# Patient Record
Sex: FEMALE | Race: WHITE | Employment: OTHER | URBAN - METROPOLITAN AREA
[De-identification: names, ages, dates, MRNs, and addresses within clinical notes are randomized per-mention and may not be internally consistent; named-entity substitution may affect disease eponyms.]

---

## 2017-01-23 ENCOUNTER — OFFICE VISIT (OUTPATIENT)
Dept: FAMILY MEDICINE CLINIC | Age: 67
End: 2017-01-23

## 2017-01-23 VITALS
SYSTOLIC BLOOD PRESSURE: 116 MMHG | BODY MASS INDEX: 34.6 KG/M2 | TEMPERATURE: 97.2 F | HEART RATE: 89 BPM | DIASTOLIC BLOOD PRESSURE: 73 MMHG | RESPIRATION RATE: 16 BRPM | HEIGHT: 62 IN | WEIGHT: 188 LBS

## 2017-01-23 DIAGNOSIS — E78.5 HYPERLIPIDEMIA, UNSPECIFIED HYPERLIPIDEMIA TYPE: ICD-10-CM

## 2017-01-23 DIAGNOSIS — M17.11 PRIMARY OSTEOARTHRITIS OF RIGHT KNEE: ICD-10-CM

## 2017-01-23 DIAGNOSIS — F41.9 ANXIETY: ICD-10-CM

## 2017-01-23 DIAGNOSIS — F32.A DEPRESSION, UNSPECIFIED DEPRESSION TYPE: ICD-10-CM

## 2017-01-23 DIAGNOSIS — G47.9 SLEEP DISTURBANCE: ICD-10-CM

## 2017-01-23 DIAGNOSIS — E66.9 OBESITY, CLASS I, BMI 30-34.9: ICD-10-CM

## 2017-01-23 DIAGNOSIS — F33.8 SEASONAL AFFECTIVE DISORDER (HCC): Primary | ICD-10-CM

## 2017-01-23 RX ORDER — MELOXICAM 15 MG/1
15 TABLET ORAL DAILY
Qty: 90 TAB | Refills: 1 | Status: SHIPPED | OUTPATIENT
Start: 2017-01-23 | End: 2017-07-24 | Stop reason: SDUPTHER

## 2017-01-23 NOTE — MR AVS SNAPSHOT
Visit Information Date & Time Provider Department Dept. Phone Encounter #  
 1/23/2017 12:15 PM Dillon Ortega  [facility] [address] [phone] [id number] Follow-up Instructions Return in about 6 months (around 7/23/2017) for physical exam. Upcoming Health Maintenance Date Due COLONOSCOPY 6/10/2017 BREAST CANCER SCRN MAMMOGRAM 8/17/2017 PAP AKA CERVICAL CYTOLOGY 10/25/2017 DTaP/Tdap/Td series (2 - Td) 3/25/2025 Allergies as of 1/23/2017  Review Complete On: 1/23/2017 By: Dillon Ortega MD  
 No Known Allergies Current Immunizations  Reviewed on 7/11/2016 Name Date Influenza Vaccine 10/8/2014 Tdap 3/25/2015 Not reviewed this visit You Were Diagnosed With   
  
 Codes Comments [facility] ([facility]    -  Primary ICD-10-CM: F39 
ICD-9-CM: 296.99 Anxiety     ICD-10-CM: F41.9 ICD-9-CM: 300.00 Depression, unspecified depression type     ICD-10-CM: F32.9 ICD-9-CM: 170 Obesity, Class I, BMI 30-34.9     ICD-10-CM: E66.9 ICD-9-CM: 278.00 Sleep disturbance     ICD-10-CM: G47.9 ICD-9-CM: 780.50 Primary osteoarthritis of right knee     ICD-10-CM: M17.11 ICD-9-CM: 715.16 Hyperlipidemia, unspecified hyperlipidemia type     ICD-10-CM: E78.5 ICD-9-CM: 272.4 Vitals BP Pulse Temp Resp Height(growth percentile) Weight(growth percentile) 116/73 (BP 1 Location: Right arm, BP Patient Position: Sitting) 89 97.2 °F (36.2 °C) (Oral) 16 5' 2\" (1.575 m) 188 lb (85.3 kg) BMI OB Status Smoking Status 34.39 kg/m2 Hysterectomy Former Smoker Vitals History BMI and BSA Data Body Mass Index Body Surface Area  
 34.39 kg/m 2 1.93 m 2 Preferred Pharmacy Pharmacy Name Phone [address] [facility] [phone] Your Updated Medication List  
  
   
This list is accurate as of: 1/23/17  1:01 PM.  Always use your most recent med list.  
  
  
  
  
 albuterol 90 mcg/actuation inhaler Commonly known as:  PROVENTIL HFA, VENTOLIN HFA, PROAIR HFA Take 2 puffs by inhalation every four (4) hours as needed for Wheezing. ALLEGRA-D 12 HOUR PO Take 1 Tab by mouth daily as needed (Allergies). Indications: SEASONAL ALLERGIES  
  
 busPIRone 10 mg tablet Commonly known as:  BUSPAR Take 1 Tab by mouth two (2) times a day. Indications: GENERALIZED ANXIETY DISORDER  
  
 CALCIUM + D PO Take 1 Tab by mouth daily. Indications: SUPPLEMENT  
  
 escitalopram oxalate 20 mg tablet Commonly known as:  Duwaine Needs Take 1 Tab by mouth daily. Indications: MAJOR DEPRESSIVE DISORDER  
  
 magnesium oxide 400 mg tablet Commonly known as:  MAG-OX Take 400 mg by mouth daily. meloxicam 15 mg tablet Commonly known as:  MOBIC Take 1 Tab by mouth daily. Indications: OSTEOARTHRITIS MULTIPLE VITAMIN PO Take 1 Tab by mouth daily. Indications: VITAMIN DEFICIENCY PREVENTION  
  
 OLANZapine 5 mg tablet Commonly known as:  ZyPREXA Take 1 Tab by mouth nightly. Indications: DEPRESSION TREATMENT ADJUNCT  
  
 omega-3 fatty acids-vitamin e 1,000 mg Cap Take 2 Caps by mouth. Prescriptions Sent to Pharmacy Refills  
 meloxicam (MOBIC) 15 mg tablet 1 Sig: Take 1 Tab by mouth daily. Indications: OSTEOARTHRITIS Class: Normal  
 Pharmacy: [address] [place] [address] Ph #: [phone] Route: Oral  
  
We Performed the Following CRP, HIGH SENSITIVITY [47243 CPT(R)] LIPID PANEL [79477 CPT(R)] Follow-up Instructions Return in about 6 months (around 7/23/2017) for physical exam.  
  
  
Introducing [facility]! Dear Mir Hammer: 
Thank you for requesting a mValent account. Our records indicate that you already have an active mValent account. You can access your account anytime at [email or web address] Did you know that you can access your hospital and ER discharge instructions at any time in [email or web address]? You can also review all of your test results from your hospital stay or ER visit. Additional Information If you have questions, please visit the Frequently Asked Questions section of the [email or web address] website at [email or web address] Remember, [email or web address] is NOT to be used for urgent needs. For medical emergencies, dial 911. Now available from your iPhone and Android! Please provide this summary of care documentation to your next provider. Your primary care clinician is listed as Donata Louder. If you have any questions after today's visit, please call [phone].

## 2017-01-23 NOTE — PROGRESS NOTES
\"Reviewed record in preparation for visit and have obtained the necessary documentation\"  Chief Complaint   Patient presents with    Depression     follow up     Anxiety     follow up     Patient presents in the office today for a follow up anxiety and depression     1. Have you been to the ER, urgent care clinic since your last visit? Hospitalized since your last visit? No    2. Have you seen or consulted any other health care providers outside of the [address] [place] [address] since your last visit? Include any pap smears or colon screening.  No

## 2017-01-23 NOTE — PROGRESS NOTES
HISTORY OF PRESENT ILLNESS  Mj Garcia is a 61 y.o. female. Blood pressure 116/73, pulse 89, temperature 97.2 °F (36.2 °C), temperature source Oral, resp. rate 16, height 5' 2\" (1.575 m), weight 188 lb (85.3 kg). Body mass index is 34.39 kg/(m^2). Chief Complaint   Patient presents with    Depression     follow up     Anxiety     follow up      HPI   Mj Garcia 61 y.o. female  presents to the office today for a follow up. Bp at office today 116/73. Anxiety/depression: Pt continues with Lexapro 20 mg daily, Zyprexa 5 mg nightly, and Buspar 10 mg BID. I have completed depression questionnaire with pt today. She reports little interest in daily activity half the time, feeling down half the days, issues with sleep only a few days, little energy more than half the time, overeating half the days, feeling like she let others down nearly everyday, trouble concentrating about half the days, no moodiness or fidgeting noticed by others, and no SI/HI. Her score is 14. Pt notes her symptoms affect her at home and medications have improved symptoms. Pt notes her symptoms are worse in the fall and winter. Likely seasonal affective disorder. Depression and anxiety is stable, continue current regimen. Hyperlipidemia: Lipid panel on 07/12/16 notable for total cholesterol 263, , HDL 65, and triglycerides 148. Pt is currently not on any statins. LDL is elevated at 168 per labs on 07/12/16. Will reassess levels today. Will also check high sensitivity CRP today to rule out any abnormality. Current Outpatient Prescriptions   Medication Sig Dispense Refill    meloxicam (MOBIC) 15 mg tablet Take 1 Tab by mouth daily. Indications: OSTEOARTHRITIS 90 Tab 1    magnesium oxide (MAG-OX) 400 mg tablet Take 400 mg by mouth daily.  omega-3 fatty acids-vitamin e 1,000 mg cap Take 2 Caps by mouth.  OLANZapine (ZYPREXA) 5 mg tablet Take 1 Tab by mouth nightly.  Indications: DEPRESSION TREATMENT ADJUNCT 30 Tab 0    escitalopram oxalate (LEXAPRO) 20 mg tablet Take 1 Tab by mouth daily. Indications: MAJOR DEPRESSIVE DISORDER 30 Tab 0    busPIRone (BUSPAR) 10 mg tablet Take 1 Tab by mouth two (2) times a day. Indications: GENERALIZED ANXIETY DISORDER 60 Tab 1    albuterol (PROVENTIL HFA, VENTOLIN HFA, PROAIR HFA) 90 mcg/actuation inhaler Take 2 puffs by inhalation every four (4) hours as needed for Wheezing. 1 Inhaler 0    CALCIUM CARBONATE/VITAMIN D3 (CALCIUM + D PO) Take 1 Tab by mouth daily. Indications: SUPPLEMENT      FEXOFENADINE/PSEUDOEPHEDRINE (ALLEGRA-D 12 HOUR PO) Take 1 Tab by mouth daily as needed (Allergies). Indications: SEASONAL ALLERGIES      MULTIVITAMINS (MULTIPLE VITAMIN PO) Take 1 Tab by mouth daily. Indications: VITAMIN DEFICIENCY PREVENTION       No Known Allergies  Past Medical History   Diagnosis Date    Anxiety     AR (allergic rhinitis)     Depression     Spondylosis      Past Surgical History   Procedure Laterality Date    Hx hysterectomy       Family History   Problem Relation Age of Onset    Cancer Mother 80     lyphoma    Lung Disease Father     Heart Disease Father 80     a fib    Other Brother 40     Amlydosis     Social History   Substance Use Topics    Smoking status: Former Smoker     Years: 30.00     Quit date: 9/9/2003    Smokeless tobacco: Never Used    Alcohol use 0.5 oz/week     1 Glasses of wine per week      Comment: ocassional        Review of Systems   Constitutional: Negative. Negative for malaise/fatigue. Eyes: Negative for blurred vision. Respiratory: Negative for shortness of breath. Cardiovascular: Negative for chest pain and leg swelling. Musculoskeletal: Negative. Neurological: Negative. Negative for dizziness and headaches. Psychiatric/Behavioral: Positive for depression. Negative for suicidal ideas. The patient is nervous/anxious. All other systems reviewed and are negative.       Physical Exam   Constitutional: She is oriented to person, place, and time. She appears well-developed and well-nourished. No distress. HENT:   Head: Normocephalic and atraumatic. Neck: Carotid bruit is not present. Cardiovascular: Normal rate, regular rhythm, normal heart sounds and intact distal pulses. Exam reveals no gallop and no friction rub. No murmur heard. Pulmonary/Chest: Effort normal and breath sounds normal. No respiratory distress. She has no wheezes. She has no rales. Musculoskeletal: She exhibits no edema. Neurological: She is alert and oriented to person, place, and time. Skin: She is not diaphoretic. Psychiatric: She has a normal mood and affect. Her behavior is normal. Judgment and thought content normal.   Nursing note and vitals reviewed. ASSESSMENT and PLAN  Flores Joyce was seen today for depression and anxiety. Diagnoses and all orders for this visit:    Seasonal affective disorder ([facility]  Stable, continue current regimen    Anxiety  See above    Depression, unspecified depression type  See above    Obesity, Class I, BMI 30-34.9  I have reviewed/discussed the above normal BMI with the patient. I have recommended the following interventions: dietary management education, guidance, and counseling, dietary needs education, encourage exercise, feeding regime, lifestyle education regarding diet and monitor weight . The plan is as follows: I have counseled this patient on diet and exercise regimens. .      Sleep disturbance  Pt notes this has improved. Pt to notify me if symptoms progress or fail to improve. Primary osteoarthritis of right knee  -     meloxicam (MOBIC) 15 mg tablet; Take 1 Tab by mouth daily. Indications: OSTEOARTHRITIS  - Stable, continue current regimen    Hyperlipidemia, unspecified hyperlipidemia type  -     CRP, HIGH SENSITIVITY  -     LIPID PANEL  - LDL is elevated at 168 per labs on 07/20/16. Will reassess levels today. Will also check high sensitivity CRP to rule out any abnormality.        Follow-up Disposition:  Return in about 6 months (around 7/23/2017) for physical exam.     Medication risks/benefits/costs/interactions/alternatives discussed with patient. Advised patient to call back or return to office if symptoms worsen/change/persist.  If patient cannot reach us or should anything more severe/urgent arise he/she should proceed directly to the nearest emergency department. Discussed expected course/resolution/complications of diagnosis in detail with patient. Patient given a written after visit summary which includes her diagnoses, current medications and vitals. Patient expressed understanding with the diagnosis and plan. Written by khari Ness, as dictated by Dawood Rogers M.D.    I have reviewed and agree with the above note and have made corrections where appropriate, Dr. Cyndie Sandoval MD

## 2017-01-25 LAB
CHOLEST SERPL-MCNC: 297 MG/DL (ref 100–199)
CRP SERPL HS-MCNC: 5.31 MG/L (ref 0–3)
HDLC SERPL-MCNC: 66 MG/DL
INTERPRETATION, 910389: NORMAL
LDLC SERPL CALC-MCNC: 197 MG/DL (ref 0–99)
TRIGL SERPL-MCNC: 172 MG/DL (ref 0–149)
VLDLC SERPL CALC-MCNC: 34 MG/DL (ref 5–40)

## 2017-02-05 NOTE — PROGRESS NOTES
Cholesterol and extremely elevated CRP levels! Pt needs to start start Lipitor 20 mg daily #30 2 refills ( send to pharmacy)  Discuss side effects of meds with pt also ( elevated liver enzymes, myalgias)  Please call in the script and inform pt. Pt also needs a 3 month follow up.     Also we need to order a CT heart with calcium ( coronary calcium score)

## 2017-02-06 RX ORDER — ATORVASTATIN CALCIUM 20 MG/1
20 TABLET, FILM COATED ORAL DAILY
Qty: 30 TAB | Refills: 3 | Status: SHIPPED | OUTPATIENT
Start: 2017-02-06 | End: 2017-12-12 | Stop reason: SDUPTHER

## 2017-02-06 NOTE — PROGRESS NOTES
Called pt, explained regarding CT and Lipitor Rx, discussed SE,  appt for 3 months set: Monday, May 08, 2017 11:45 AM. Verified pharmacy and advised of CRP. Meds ordered, CT Ordered, explained central scheduling will call her. Patient/ Caller given an opportunity to ask questions, repeated information, and verbalized understanding.

## 2017-05-08 ENCOUNTER — OFFICE VISIT (OUTPATIENT)
Dept: FAMILY MEDICINE CLINIC | Age: 67
End: 2017-05-08

## 2017-05-08 VITALS
HEART RATE: 76 BPM | DIASTOLIC BLOOD PRESSURE: 63 MMHG | OXYGEN SATURATION: 97 % | WEIGHT: 184.8 LBS | RESPIRATION RATE: 15 BRPM | TEMPERATURE: 96.9 F | HEIGHT: 62 IN | BODY MASS INDEX: 34.01 KG/M2 | SYSTOLIC BLOOD PRESSURE: 127 MMHG

## 2017-05-08 DIAGNOSIS — E87.5 SERUM POTASSIUM ELEVATED: ICD-10-CM

## 2017-05-08 DIAGNOSIS — J30.9 ALLERGIC RHINITIS, UNSPECIFIED ALLERGIC RHINITIS TRIGGER, UNSPECIFIED RHINITIS SEASONALITY: ICD-10-CM

## 2017-05-08 DIAGNOSIS — F32.89 DEPRESSIVE DISORDER, NOT ELSEWHERE CLASSIFIED: ICD-10-CM

## 2017-05-08 DIAGNOSIS — E66.9 OBESITY, CLASS I, BMI 30-34.9: ICD-10-CM

## 2017-05-08 DIAGNOSIS — F41.9 ANXIETY: ICD-10-CM

## 2017-05-08 DIAGNOSIS — E78.2 MIXED HYPERLIPIDEMIA: Primary | ICD-10-CM

## 2017-05-08 DIAGNOSIS — Z12.31 SCREENING MAMMOGRAM, ENCOUNTER FOR: ICD-10-CM

## 2017-05-08 DIAGNOSIS — Z01.419 ENCOUNTER FOR GYNECOLOGICAL EXAMINATION WITHOUT ABNORMAL FINDING: ICD-10-CM

## 2017-05-08 RX ORDER — OLANZAPINE 2.5 MG/1
TABLET ORAL
Refills: 0 | COMMUNITY
Start: 2017-04-26 | End: 2018-03-07 | Stop reason: ALTCHOICE

## 2017-05-08 NOTE — PROGRESS NOTES
HISTORY OF PRESENT ILLNESS  Marvine Rubinstein is a 61 y.o. female. Blood pressure 127/63, pulse 76, temperature 96.9 °F (36.1 °C), temperature source Oral, resp. rate 15, height 5' 2\" (1.575 m), weight 184 lb 12.8 oz (83.8 kg), SpO2 97 %. Body mass index is 33.8 kg/(m^2). Chief Complaint   Patient presents with    Labs     3 months follow up visit    Cholesterol Problem      HPI   Marvine Rubinstein 61 y.o. female  presents to the office today for a follow up on chronic conditions. Bp at office today 127/63. Hyperlipidemia: Lipid panel on 01/24/17 notable for total cholesterol 297, , HDL 66, and triglycerides 172. Pt was advised to start Lipitor 20 mg at last visit on 01/23/17, but pt notes she never started the medication. She has been working on her diet and has been exercising. Will reassess her levels today. Pt states she will start the Lipitor if her lipids continue to be elevated. Depression/anxiety: Pt notes her mood has improved significantly since last visit on 01/23/17. Depression screening completed today. Her PHQ-2 score today was 0. She endorses sleep issues more than half the days, does not feel tired, does not endorse poor appetite/overeating, feels bad about herself several days a week, issues with concentration not at all, family members have noticed psychoretardation not at all, and denies SI/HI. PHQ-9 score today was 3 which is decreased from PHQ-9 of 14 at last visit. Pt notes her symptoms generally improve in the spring and summer. Pt continues with Lexapro 20 mg daily, Zyprexa 2.5 mg nightly, and Buspar 10 mg BID. Pt states her psychiatrist has started weaning her off the Zyprexa. She does note increased sleep issues since decreasing the Zyprexa dosage. She has issues with falling asleep and occasionally wakes up in the middle of night. Health maintenance: I have advised pt to complete cardiac CT scan to rule out any abnormality.  Pt does note family history of atrial fibrillation in father, but notes her father passed away at age 80. Pt is due for mammogram. Orders placed today. Pt notes her current gynecologist is no longer covered by her insurance. Will refer pt to Dr. Kailyn Mario (gynecology) to complete GYN exam.       Current Outpatient Prescriptions   Medication Sig Dispense Refill    OLANZapine (ZYPREXA) 2.5 mg tablet TAKE 1 TABLET EVERY DAY  0    meloxicam (MOBIC) 15 mg tablet Take 1 Tab by mouth daily. Indications: OSTEOARTHRITIS 90 Tab 1    magnesium oxide (MAG-OX) 400 mg tablet Take 400 mg by mouth daily.  omega-3 fatty acids-vitamin e 1,000 mg cap Take 2 Caps by mouth.  escitalopram oxalate (LEXAPRO) 20 mg tablet Take 1 Tab by mouth daily. Indications: MAJOR DEPRESSIVE DISORDER 30 Tab 0    busPIRone (BUSPAR) 10 mg tablet Take 1 Tab by mouth two (2) times a day. Indications: GENERALIZED ANXIETY DISORDER 60 Tab 1    albuterol (PROVENTIL HFA, VENTOLIN HFA, PROAIR HFA) 90 mcg/actuation inhaler Take 2 puffs by inhalation every four (4) hours as needed for Wheezing. 1 Inhaler 0    CALCIUM CARBONATE/VITAMIN D3 (CALCIUM + D PO) Take 1 Tab by mouth daily. Indications: SUPPLEMENT      FEXOFENADINE/PSEUDOEPHEDRINE (ALLEGRA-D 12 HOUR PO) Take 1 Tab by mouth daily as needed (Allergies). Indications: SEASONAL ALLERGIES      MULTIVITAMINS (MULTIPLE VITAMIN PO) Take 1 Tab by mouth daily. Indications: VITAMIN DEFICIENCY PREVENTION      atorvastatin (LIPITOR) 20 mg tablet Take 1 Tab by mouth daily.  30 Tab 3     No Known Allergies  Past Medical History:   Diagnosis Date    Anxiety     AR (allergic rhinitis)     Depression     Spondylosis      Past Surgical History:   Procedure Laterality Date    HX HYSTERECTOMY       Family History   Problem Relation Age of Onset    Cancer Mother 80     lyphoma    Lung Disease Father     Heart Disease Father 80     a fib    Other Brother 40     Amlydosis     Social History   Substance Use Topics    Smoking status: Former Smoker     Years: 30.00     Quit date: 9/9/2003    Smokeless tobacco: Never Used    Alcohol use 0.5 oz/week     1 Glasses of wine per week      Comment: ocassional        Review of Systems   Constitutional: Negative. Negative for malaise/fatigue. Eyes: Negative for blurred vision. Respiratory: Negative for shortness of breath. Cardiovascular: Negative for chest pain and leg swelling. Musculoskeletal: Negative. Neurological: Negative. Negative for dizziness and headaches. Endo/Heme/Allergies: Positive for environmental allergies. All other systems reviewed and are negative. Physical Exam   Constitutional: She is oriented to person, place, and time. She appears well-developed and well-nourished. No distress. HENT:   Head: Normocephalic and atraumatic. Neck: Carotid bruit is not present. Cardiovascular: Normal rate, regular rhythm, normal heart sounds and intact distal pulses. Exam reveals no gallop and no friction rub. No murmur heard. Pulmonary/Chest: Effort normal and breath sounds normal. No respiratory distress. She has no wheezes. She has no rales. Musculoskeletal: She exhibits no edema. Neurological: She is alert and oriented to person, place, and time. Skin: She is not diaphoretic. Psychiatric: She has a normal mood and affect. Her behavior is normal. Judgment and thought content normal.   Nursing note and vitals reviewed. ASSESSMENT and PLAN  Kristie Pedro was seen today for labs and cholesterol problem. Diagnoses and all orders for this visit:    Mixed hyperlipidemia  -     CT HEART W/O CONT WITH CALCIUM; Future  -     METABOLIC PANEL, COMPREHENSIVE  -     LIPID PANEL  - LDL elevated at 197 per labs on 01/24/17. Pt did not start on advised Lipitor, but has been working on diet and exercise. Will reassess levels today and pt states she will consider starting the Lipitor if her lipids continues to be elevated.  Advised pt to complete cardiac CT to rule out any abnormality. Obesity, Class I, BMI 30-34.9  I have reviewed/discussed the above normal BMI with the patient. I have recommended the following interventions: dietary management education, guidance, and counseling, encourage exercise, monitor weight and prescribed dietary intake . Depressive disorder, not elsewhere classified  Stable, continue current regimen    Anxiety  Stable, continue current regimen    Allergic rhinitis, unspecified allergic rhinitis trigger, unspecified rhinitis seasonality    Screening mammogram, encounter for  -     [facility] MAMMO BI SCREENING INCL CAD; Future    Encounter for gynecological examination without abnormal finding  -     REFERRAL TO OBSTETRICS AND GYNECOLOGY (Dr. Sadia Swartz)       Follow-up Disposition:  Return in about 3 months (around 8/8/2017) for hyperlipidemia follow up. Medication risks/benefits/costs/interactions/alternatives discussed with patient. Advised patient to call back or return to office if symptoms worsen/change/persist.  If patient cannot reach us or should anything more severe/urgent arise he/she should proceed directly to the nearest emergency department. Discussed expected course/resolution/complications of diagnosis in detail with patient. Patient given a written after visit summary which includes her diagnoses, current medications and vitals. Patient expressed understanding with the diagnosis and plan. Written by khari Griggs, as dictated by Argelia Espino M.D.    I have reviewed and agree with the above note and have made corrections where appropriate, Dr. Lisa Martinez MD

## 2017-05-08 NOTE — PROGRESS NOTES
Chief Complaint   Patient presents with    Labs     3 months follow up visit    Cholesterol Problem     Identified pt with two pt identifiers (Name @ )    1. Have you been to the ER, urgent care clinic since your last visit? Hospitalized since your last visit? No    2. Have you seen or consulted any other health care providers outside of the [address] [place] [address] since your last visit? Include any pap smears or colon screening.  No     \"REVIEWED RECORD IN PREPARATION FOR VISIT AND HAVE OBTAINED NECESSARY DOCUMENTATION\"

## 2017-05-09 LAB
ALBUMIN SERPL-MCNC: 4.6 G/DL (ref 3.6–4.8)
ALBUMIN/GLOB SERPL: 1.8 {RATIO} (ref 1.2–2.2)
ALP SERPL-CCNC: 94 IU/L (ref 39–117)
ALT SERPL-CCNC: 24 IU/L (ref 0–32)
AST SERPL-CCNC: 25 IU/L (ref 0–40)
BILIRUB SERPL-MCNC: 0.4 MG/DL (ref 0–1.2)
BUN SERPL-MCNC: 21 MG/DL (ref 8–27)
BUN/CREAT SERPL: 22 (ref 12–28)
CALCIUM SERPL-MCNC: 10 MG/DL (ref 8.7–10.3)
CHLORIDE SERPL-SCNC: 103 MMOL/L (ref 96–106)
CHOLEST SERPL-MCNC: 287 MG/DL (ref 100–199)
CO2 SERPL-SCNC: 24 MMOL/L (ref 18–29)
CREAT SERPL-MCNC: 0.94 MG/DL (ref 0.57–1)
GLOBULIN SER CALC-MCNC: 2.5 G/DL (ref 1.5–4.5)
GLUCOSE SERPL-MCNC: 91 MG/DL (ref 65–99)
HDLC SERPL-MCNC: 72 MG/DL
INTERPRETATION, 910389: NORMAL
LDLC SERPL CALC-MCNC: 184 MG/DL (ref 0–99)
POTASSIUM SERPL-SCNC: 6 MMOL/L (ref 3.5–5.2)
PROT SERPL-MCNC: 7.1 G/DL (ref 6–8.5)
SODIUM SERPL-SCNC: 143 MMOL/L (ref 134–144)
TRIGL SERPL-MCNC: 155 MG/DL (ref 0–149)
VLDLC SERPL CALC-MCNC: 31 MG/DL (ref 5–40)

## 2017-05-15 NOTE — PROGRESS NOTES
Repeat potassium this week  Elevated, specimen likely hemilyzoed  Place order and inform patient. Pt needs to start cholesterol meds and RTO in 3 months  Call pt please and set up appt.

## 2017-05-19 NOTE — PROGRESS NOTES
Called patient at [phone] and left message to call the office at [phone] to discuss lab results.

## 2017-05-26 NOTE — PROGRESS NOTES
Mail letter to patient attempted to call multiple times and no call back to call us back to discuss lab

## 2017-06-05 ENCOUNTER — TELEPHONE (OUTPATIENT)
Dept: FAMILY MEDICINE CLINIC | Age: 67
End: 2017-06-05

## 2017-06-05 NOTE — TELEPHONE ENCOUNTER
Patient is calling in regards to a missed call from [facility], tried contacting nurse, no success.     Best call back # for patient: [phone]

## 2017-06-05 NOTE — PROGRESS NOTES
[phone] verified  notified patient of her labs and set up appointment for  at [address] [place] [address]

## 2017-06-05 NOTE — TELEPHONE ENCOUNTER
[phone] verified  notified patient of her labs and set up appointment for  at [address] [place] [address] 1

## 2017-06-09 DIAGNOSIS — E87.5 SERUM POTASSIUM ELEVATED: ICD-10-CM

## 2017-06-10 LAB — POTASSIUM SERPL-SCNC: 4.5 MMOL/L (ref 3.5–5.2)

## 2017-06-12 NOTE — PROGRESS NOTES
[phone] (home) attempted to call patient no answer left message to call me back in regards to her labs

## 2017-06-14 NOTE — PROGRESS NOTES
[phone] ([place]) attempted to call patient no answer left message to call me back in regards to her labs

## 2017-06-14 NOTE — PROGRESS NOTES
[phone] attempted to call patient no answer left message and notified patient via private VM potassium are normal

## 2017-07-11 ENCOUNTER — HOSPITAL ENCOUNTER (OUTPATIENT)
Dept: CT IMAGING | Age: 67
Discharge: HOME | End: 2017-07-11
Attending: FAMILY MEDICINE
Payer: SELF-PAY

## 2017-07-11 DIAGNOSIS — E78.2 MIXED HYPERLIPIDEMIA: ICD-10-CM

## 2017-07-11 PROCEDURE — 75571 CT HRT W/O DYE W/CA TEST: CPT

## 2017-07-16 NOTE — PROGRESS NOTES
Pt has 3 appt with me  8/7,8/16 and 9/11  Please ask her what date she is coming and cancel 2 of them.     Also I want to dicuss her CT of the heart in detail

## 2017-07-17 NOTE — PROGRESS NOTES
[phone] ([place]) attempted to call patient no answer left message to call me back in regards to CT Hear scan

## 2017-07-24 DIAGNOSIS — M17.11 PRIMARY OSTEOARTHRITIS OF RIGHT KNEE: ICD-10-CM

## 2017-07-25 RX ORDER — MELOXICAM 15 MG/1
TABLET ORAL
Qty: 90 TAB | Refills: 0 | Status: SHIPPED | OUTPATIENT
Start: 2017-07-25 | End: 2017-08-16 | Stop reason: SDUPTHER

## 2017-08-16 ENCOUNTER — OFFICE VISIT (OUTPATIENT)
Dept: FAMILY MEDICINE CLINIC | Age: 67
End: 2017-08-16

## 2017-08-16 VITALS
OXYGEN SATURATION: 98 % | HEART RATE: 69 BPM | HEIGHT: 62 IN | DIASTOLIC BLOOD PRESSURE: 67 MMHG | TEMPERATURE: 96.3 F | RESPIRATION RATE: 14 BRPM | SYSTOLIC BLOOD PRESSURE: 114 MMHG | WEIGHT: 182.2 LBS | BODY MASS INDEX: 33.53 KG/M2

## 2017-08-16 DIAGNOSIS — Z12.11 COLON CANCER SCREENING: ICD-10-CM

## 2017-08-16 DIAGNOSIS — Z00.00 PHYSICAL EXAM: Primary | ICD-10-CM

## 2017-08-16 DIAGNOSIS — E78.2 MIXED HYPERLIPIDEMIA: ICD-10-CM

## 2017-08-16 DIAGNOSIS — M17.11 PRIMARY OSTEOARTHRITIS OF RIGHT KNEE: ICD-10-CM

## 2017-08-16 DIAGNOSIS — E66.9 OBESITY, CLASS I, BMI 30-34.9: ICD-10-CM

## 2017-08-16 RX ORDER — MELOXICAM 15 MG/1
TABLET ORAL
Qty: 90 TAB | Refills: 1 | Status: SHIPPED | OUTPATIENT
Start: 2017-08-16 | End: 2018-03-07 | Stop reason: SDUPTHER

## 2017-08-16 NOTE — PROGRESS NOTES
HISTORY OF PRESENT ILLNESS  Elijah Barrett is a 61 y.o. female. Blood pressure 114/67, pulse 69, temperature 96.3 °F (35.7 °C), temperature source Oral, resp. rate 14, height 5' 2\" (1.575 m), weight 182 lb 3.2 oz (82.6 kg), SpO2 98 %. Body mass index is 33.32 kg/(m^2). Chief Complaint   Patient presents with    Complete Physical    Results     discuss heart CT scan results        HPI  Elijah Barrett 61 y.o. female  presents to the office today for complete physical and results of CT scan. CT scan results: Pt had a CT scan of the heart done on 07/11/17 that was significant for \"Total calcium score of 183\" and a \"6 mm nodule in the right lower lobe\". The highest score was in the Left Anterior Descending Artery (122). Findings suggest moderate evidence of CAD. Pt states that she didn't know of the nodule in the lung. Pt states that she is currenlly taking Lipitor 20 mg daily. I have advised that we can do more imaging of the lung to further evaluate the nodule. Anxiety/Depression: Depression screening completed and pt states little interest in doings things not at all, feeling down not at all, sleep issues not at all, feeling tired not at all, poor appetite/overeating not at all, feeling bad about oneself not at all, issues with concentration not at all, family members have noticed psychoretardation not at all, and denies SI/HI. PHQ-9 score today 0. Pt states that she sees Dr. Sukumar Conde (Psychiatry) and her daughter wants her to get off of Zyprexa 2.5 mg daily because she think it is causing her to gain weight. Pt states that Zyprexa medication helps her sleep. Pt denies any issues with depression. Stable, continue current regimen. Health Maintenance: Pt states that she will see Dr. Buzz Acosta (Gynecology) next week. Pt is due for colonoscopy with Dr. Afsaneh Walton (GI). Pt states that she hasn't been exercising regularly, but has lost 2 pounds since last office visit.      Current Outpatient Prescriptions   Medication Sig Dispense Refill    meloxicam (MOBIC) 15 mg tablet TAKE 1 TABLET DAILY FOR OSTEOARTHRITIS 90 Tab 1    OLANZapine (ZYPREXA) 2.5 mg tablet TAKE 1 TABLET EVERY DAY  0    atorvastatin (LIPITOR) 20 mg tablet Take 1 Tab by mouth daily. 30 Tab 3    magnesium oxide (MAG-OX) 400 mg tablet Take 400 mg by mouth daily.  omega-3 fatty acids-vitamin e 1,000 mg cap Take 2 Caps by mouth.  escitalopram oxalate (LEXAPRO) 20 mg tablet Take 1 Tab by mouth daily. Indications: MAJOR DEPRESSIVE DISORDER 30 Tab 0    busPIRone (BUSPAR) 10 mg tablet Take 1 Tab by mouth two (2) times a day. Indications: GENERALIZED ANXIETY DISORDER 60 Tab 1    CALCIUM CARBONATE/VITAMIN D3 (CALCIUM + D PO) Take 1 Tab by mouth daily. Indications: SUPPLEMENT      FEXOFENADINE/PSEUDOEPHEDRINE (ALLEGRA-D 12 HOUR PO) Take 1 Tab by mouth daily as needed (Allergies). Indications: SEASONAL ALLERGIES      MULTIVITAMINS (MULTIPLE VITAMIN PO) Take 1 Tab by mouth daily. Indications: VITAMIN DEFICIENCY PREVENTION      albuterol (PROVENTIL HFA, VENTOLIN HFA, PROAIR HFA) 90 mcg/actuation inhaler Take 2 puffs by inhalation every four (4) hours as needed for Wheezing.  (Patient not taking: Reported on 8/16/2017) 1 Inhaler 0     No Known Allergies  Past Medical History:   Diagnosis Date    Anxiety     AR (allergic rhinitis)     Depression     Spondylosis      Past Surgical History:   Procedure Laterality Date    HX HYSTERECTOMY       Family History   Problem Relation Age of Onset    Cancer Mother 80     lyphoma    Lung Disease Father     Heart Disease Father 80     a fib    Other Brother 40     Amlydosis     Social History   Substance Use Topics    Smoking status: Former Smoker     Years: 30.00     Quit date: 9/9/2003    Smokeless tobacco: Never Used    Alcohol use 0.5 oz/week     1 Glasses of wine per week      Comment: ocassional        Review of Systems   Constitutional: Negative for chills, diaphoresis, fever, malaise/fatigue and weight loss. HENT: Negative for congestion, ear discharge, ear pain, hearing loss, nosebleeds, sore throat and tinnitus. Eyes: Negative for blurred vision, double vision, photophobia, pain, discharge and redness. Respiratory: Negative for cough, hemoptysis, sputum production, shortness of breath, wheezing and stridor. Cardiovascular: Negative for chest pain, palpitations, orthopnea, claudication, leg swelling and PND. Gastrointestinal: Negative for abdominal pain, blood in stool, constipation, diarrhea, heartburn, melena, nausea and vomiting. Genitourinary: Negative for dysuria, flank pain, frequency, hematuria and urgency. Musculoskeletal: Negative for back pain, falls, joint pain, myalgias and neck pain. Skin: Negative for itching and rash. Neurological: Negative for dizziness, tingling, tremors, sensory change, speech change, focal weakness, seizures, loss of consciousness, weakness and headaches. Endo/Heme/Allergies: Negative for environmental allergies and polydipsia. Does not bruise/bleed easily. Psychiatric/Behavioral: Negative for depression, hallucinations, memory loss, substance abuse and suicidal ideas. The patient is not nervous/anxious and does not have insomnia. All other systems reviewed and are negative. Physical Exam   Constitutional: She is oriented to person, place, and time. She appears well-developed and well-nourished. No distress. HENT:   Head: Normocephalic and atraumatic. Right Ear: External ear normal.   Left Ear: External ear normal.   Nose: Nose normal.   Mouth/Throat: Oropharynx is clear and moist. No oropharyngeal exudate. Eyes: Conjunctivae and EOM are normal. Pupils are equal, round, and reactive to light. Right eye exhibits no discharge. Left eye exhibits no discharge. No scleral icterus. Neck: Normal range of motion. Neck supple. No JVD present. No tracheal deviation present. No thyromegaly present.    Cardiovascular: Normal rate, regular rhythm, normal heart sounds and intact distal pulses. Exam reveals no gallop and no friction rub. No murmur heard. Pulmonary/Chest: Effort normal and breath sounds normal. No stridor. No respiratory distress. She has no wheezes. She has no rales. She exhibits no tenderness. Abdominal: Soft. Bowel sounds are normal. She exhibits no distension and no mass. There is no tenderness. There is no rebound and no guarding. Musculoskeletal: Normal range of motion. She exhibits no edema or tenderness. Lymphadenopathy:     She has no cervical adenopathy. Neurological: She is alert and oriented to person, place, and time. She has normal reflexes. No cranial nerve deficit. She exhibits normal muscle tone. Coordination normal.   Skin: Skin is warm and dry. No rash noted. She is not diaphoretic. No erythema. No pallor. Psychiatric: She has a normal mood and affect. Her behavior is normal. Judgment and thought content normal.   Nursing note and vitals reviewed. ASSESSMENT and PLAN  Diagnoses and all orders for this visit:    1. Physical exam  -     METABOLIC PANEL, COMPREHENSIVE  -     CBC WITH AUTOMATED DIFF  -     LIPID PANEL  -     TSH 3RD GENERATION  -     T4, FREE  -     URINALYSIS W/MICROSCOPIC  -     VITAMIN D, 25 HYDROXY    2. Mixed hyperlipidemia        - Lipids are not at goal and pt has moderate evidence of CAD per CT scan on 07/11/17. Continue to work on diet and exercise to lower levels. Continue current medications. 3. Primary osteoarthritis of right knee  -     meloxicam (MOBIC) 15 mg tablet; TAKE 1 TABLET DAILY FOR OSTEOARTHRITIS  - Stable, continue current regimen. 4. Colon cancer screening  -     REFERRAL TO GASTROENTEROLOGY (Dr. Deonte Jarvis)  - I have referred pt to Dr. Deonte Jarvis for colonoscopy. 5. Obesity, Class I, BMI 30-34.9        - I have reviewed/discussed the above normal BMI with the patient.   I have recommended the following interventions: dietary management education, guidance, and counseling, encourage exercise and monitor weight . Follow-up Disposition:  Return in about 6 months (around 2/16/2018) for hyperlipidemia follow up. Medication risks/benefits/costs/interactions/alternatives discussed with patient. Advised patient to call back or return to office if symptoms worsen/change/persist.  If patient cannot reach us or should anything more severe/urgent arise he/she should proceed directly to the nearest emergency department. Discussed expected course/resolution/complications of diagnosis in detail with patient. Patient given a written after visit summary which includes her diagnoses, current medications and vitals. Patient expressed understanding with the diagnosis and plan. Written by khari Maxwell, as dictated by Chana Sahu M.D.   I have reviewed and agree with the above note and have made corrections where appropriate, Dr. Rosalina Joel MD

## 2017-08-16 NOTE — PROGRESS NOTES
1. Have you been to the ER, urgent care clinic since your last visit? Hospitalized since your last visit? No    2. Have you seen or consulted any other health care providers outside of the [address] [place] [address] since your last visit? Include any pap smears or colon screening. No     Chief Complaint   Patient presents with    Complete Physical    Results     discuss heart CT scan results     Fasting    Colonoscopy has not been scheduled yet.

## 2017-08-16 NOTE — PATIENT INSTRUCTIONS
Body Mass Index: Care Instructions  Your Care Instructions    Body mass index (BMI) can help you see if your weight is raising your risk for health problems. It uses a formula to compare how much you weigh with how tall you are. · A BMI lower than 18.5 is considered underweight. · A BMI between 18.5 and 24.9 is considered healthy. · A BMI between 25 and 29.9 is considered overweight. A BMI of 30 or higher is considered obese. If your BMI is in the normal range, it means that you have a lower risk for weight-related health problems. If your BMI is in the overweight or obese range, you may be at increased risk for weight-related health problems, such as high blood pressure, heart disease, stroke, arthritis or joint pain, and diabetes. If your BMI is in the underweight range, you may be at increased risk for health problems such as fatigue, lower protection (immunity) against illness, muscle loss, bone loss, hair loss, and hormone problems. BMI is just one measure of your risk for weight-related health problems. You may be at higher risk for health problems if you are not active, you eat an unhealthy diet, or you drink too much alcohol or use tobacco products. Follow-up care is a key part of your treatment and safety. Be sure to make and go to all appointments, and call your doctor if you are having problems. It's also a good idea to know your test results and keep a list of the medicines you take. How can you care for yourself at home? · Practice healthy eating habits. This includes eating plenty of fruits, vegetables, whole grains, lean protein, and low-fat dairy. · If your doctor recommends it, get more exercise. Walking is a good choice. Bit by bit, increase the amount you walk every day. Try for at least 30 minutes on most days of the week. · Do not smoke. Smoking can increase your risk for health problems. If you need help quitting, talk to your doctor about stop-smoking programs and medicines. These can increase your chances of quitting for good. · Limit alcohol to 2 drinks a day for men and 1 drink a day for women. Too much alcohol can cause health problems. If you have a BMI higher than 25  · Your doctor may do other tests to check your risk for weight-related health problems. This may include measuring the distance around your waist. A waist measurement of more than 40 inches in men or 35 inches in women can increase the risk of weight-related health problems. · Talk with your doctor about steps you can take to stay healthy or improve your health. You may need to make lifestyle changes to lose weight and stay healthy, such as changing your diet and getting regular exercise. If you have a BMI lower than 18.5  · Your doctor may do other tests to check your risk for health problems. · Talk with your doctor about steps you can take to stay healthy or improve your health. You may need to make lifestyle changes to gain or maintain weight and stay healthy, such as getting more healthy foods in your diet and doing exercises to build muscle. Where can you learn more? Go to http://lorna-darryl.info/. Enter S176 in the search box to learn more about \"Body Mass Index: Care Instructions. \"  Current as of: January 23, 2017  Content Version: 11.3  © 0246-7251 [employer]. Care instructions adapted under license by [facility] (which disclaims liability or warranty for this information). If you have questions about a medical condition or this instruction, always ask your healthcare professional. [place] [address] any warranty or liability for your use of this information.

## 2017-08-16 NOTE — MR AVS SNAPSHOT
Visit Information Date & Time Provider Department Dept. Phone Encounter #  
 8/16/2017 11:00 AM Zane Dangelo MD [address] [place] [address] [phone] [id number] Follow-up Instructions Return in about 6 months (around 2/16/2018) for hyperlipidemia follow up. Upcoming Health Maintenance Date Due COLONOSCOPY 6/10/2017 INFLUENZA AGE 9 TO ADULT 8/1/2017 BREAST CANCER SCRN MAMMOGRAM 8/17/2017 PAP AKA CERVICAL CYTOLOGY 10/25/2017 DTaP/Tdap/Td series (2 - Td) 3/25/2025 Allergies as of 8/16/2017  Review Complete On: 8/16/2017 By: Mandie Ellington LPN No Known Allergies Current Immunizations  Reviewed on 5/8/2017 Name Date Influenza Vaccine 10/8/2014 Tdap 3/25/2015 Not reviewed this visit You Were Diagnosed With   
  
 Codes Comments Physical exam    -  Primary ICD-10-CM: Z00.00 ICD-9-CM: V70.9 Mixed hyperlipidemia     ICD-10-CM: E78.2 ICD-9-CM: 272.2 Primary osteoarthritis of right knee     ICD-10-CM: M17.11 ICD-9-CM: 715.16 Colon cancer screening     ICD-10-CM: Z12.11 ICD-9-CM: V76.51 Obesity, Class I, BMI 30-34.9     ICD-10-CM: E66.9 ICD-9-CM: 278.00 Vitals BP Pulse Temp Resp Height(growth percentile) Weight(growth percentile) 114/67 (BP 1 Location: Left arm, BP Patient Position: Sitting) 69 96.3 °F (35.7 °C) (Oral) 14 5' 2\" (1.575 m) 182 lb 3.2 oz (82.6 kg) SpO2 BMI OB Status Smoking Status 98% 33.32 kg/m2 Hysterectomy Former Smoker Vitals History BMI and BSA Data Body Mass Index Body Surface Area  
 33.32 kg/m 2 1.9 m 2 Preferred Pharmacy Pharmacy Name Phone [facility] [phone] Your Updated Medication List  
  
   
This list is accurate as of: 8/16/17 12:21 PM.  Always use your most recent med list.  
  
  
  
  
 albuterol 90 mcg/actuation inhaler Commonly known as:  PROVENTIL HFA, VENTOLIN HFA, PROAIR HFA Take 2 puffs by inhalation every four (4) hours as needed for Wheezing. ALLEGRA-D 12 HOUR PO Take 1 Tab by mouth daily as needed (Allergies). Indications: SEASONAL ALLERGIES  
  
 atorvastatin 20 mg tablet Commonly known as:  LIPITOR Take 1 Tab by mouth daily. busPIRone 10 mg tablet Commonly known as:  BUSPAR Take 1 Tab by mouth two (2) times a day. Indications: GENERALIZED ANXIETY DISORDER  
  
 CALCIUM + D PO Take 1 Tab by mouth daily. Indications: SUPPLEMENT  
  
 escitalopram oxalate 20 mg tablet Commonly known as:  Thu Li Take 1 Tab by mouth daily. Indications: MAJOR DEPRESSIVE DISORDER  
  
 magnesium oxide 400 mg tablet Commonly known as:  MAG-OX Take 400 mg by mouth daily. meloxicam 15 mg tablet Commonly known as:  MOBIC  
TAKE 1 TABLET DAILY FOR OSTEOARTHRITIS MULTIPLE VITAMIN PO Take 1 Tab by mouth daily. Indications: VITAMIN DEFICIENCY PREVENTION  
  
 OLANZapine 2.5 mg tablet Commonly known as:  ZyPREXA  
TAKE 1 TABLET EVERY DAY  
  
 omega-3 fatty acids-vitamin e 1,000 mg Cap Take 2 Caps by mouth. Prescriptions Sent to Pharmacy Refills  
 meloxicam (MOBIC) 15 mg tablet 1 Sig: TAKE 1 TABLET DAILY FOR OSTEOARTHRITIS Class: Normal  
 Pharmacy: [address], [address] [place] [address] Ph #: [phone] We Performed the Following CBC WITH AUTOMATED DIFF [53931 CPT(R)] LIPID PANEL [57763 CPT(R)] METABOLIC PANEL, COMPREHENSIVE [19456 CPT(R)] REFERRAL TO GASTROENTEROLOGY [CEM73 Custom] Comments:  
 Please evaluate patient for colon screen 5 year follow up. T4, FREE [id number] CPT(R)] TSH 3RD GENERATION [93638 CPT(R)] URINALYSIS W/MICROSCOPIC [03129 CPT(R)] VITAMIN D, 25 HYDROXY [id number] CPT(R)] Follow-up Instructions Return in about 6 months (around 2/16/2018) for hyperlipidemia follow up. Referral Information Referral ID Referred By Referred To  
  
 [id number] P[facility] TERRY [address]   
   [address] [address] [place] [address] [facility], [facility] Visits Status Start Date End Date 1 New Request 8/16/17 8/16/18 If your referral has a status of pending review or denied, additional information will be sent to support the outcome of this decision. Patient Instructions Body Mass Index: Care Instructions Your Care Instructions Body mass index (BMI) can help you see if your weight is raising your risk for health problems. It uses a formula to compare how much you weigh with how tall you are. · A BMI lower than 18.5 is considered underweight. · A BMI between 18.5 and 24.9 is considered healthy. · A BMI between 25 and 29.9 is considered overweight. A BMI of 30 or higher is considered obese. If your BMI is in the normal range, it means that you have a lower risk for weight-related health problems. If your BMI is in the overweight or obese range, you may be at increased risk for weight-related health problems, such as high blood pressure, heart disease, stroke, arthritis or joint pain, and diabetes. If your BMI is in the underweight range, you may be at increased risk for health problems such as fatigue, lower protection (immunity) against illness, muscle loss, bone loss, hair loss, and hormone problems. BMI is just one measure of your risk for weight-related health problems. You may be at higher risk for health problems if you are not active, you eat an unhealthy diet, or you drink too much alcohol or use tobacco products. Follow-up care is a key part of your treatment and safety. Be sure to make and go to all appointments, and call your doctor if you are having problems. It's also a good idea to know your test results and keep a list of the medicines you take. How can you care for yourself at home? · Practice healthy eating habits. This includes eating plenty of fruits, vegetables, whole grains, lean protein, and low-fat dairy. · If your doctor recommends it, get more exercise. Walking is a good choice. Bit by bit, increase the amount you walk every day. Try for at least 30 minutes on most days of the week. · Do not smoke. Smoking can increase your risk for health problems. If you need help quitting, talk to your doctor about stop-smoking programs and medicines. These can increase your chances of quitting for good. · Limit alcohol to 2 drinks a day for men and 1 drink a day for women. Too much alcohol can cause health problems. If you have a BMI higher than 25 · Your doctor may do other tests to check your risk for weight-related health problems. This may include measuring the distance around your waist. A waist measurement of more than 40 inches in men or 35 inches in women can increase the risk of weight-related health problems. · Talk with your doctor about steps you can take to stay healthy or improve your health. You may need to make lifestyle changes to lose weight and stay healthy, such as changing your diet and getting regular exercise. If you have a BMI lower than 18.5 · Your doctor may do other tests to check your risk for health problems. · Talk with your doctor about steps you can take to stay healthy or improve your health. You may need to make lifestyle changes to gain or maintain weight and stay healthy, such as getting more healthy foods in your diet and doing exercises to build muscle. Where can you learn more? Go to http://lorna-darryl.info/. Enter S176 in the search box to learn more about \"Body Mass Index: Care Instructions. \" Current as of: January 23, 2017 Content Version: 11.3 © 3515-2627 [employer].  Care instructions adapted under license by [employer] (which disclaims liability or warranty for this information). If you have questions about a medical condition or this instruction, always ask your healthcare professional. [address] any warranty or liability for your use of this information. Introducing \[facility]\"" & HEALTH SERVICES! Dear Bill Hinkle: 
Thank you for requesting a Gamestaq account. Our records indicate that you already have an active Gamestaq account. You can access your account anytime at [email or web address]. [email or web address] Did you know that you can access your hospital and ER discharge instructions at any time in Gamestaq? You can also review all of your test results from your hospital stay or ER visit. Additional Information If you have questions, please visit the Frequently Asked Questions section of the Gamestaq website at [email or web address] Remember, Gamestaq is NOT to be used for urgent needs. For medical emergencies, dial 911. Now available from your iPhone and Android! Please provide this summary of care documentation to your next provider. Your primary care clinician is listed as Kurtis Reina. If you have any questions after today's visit, please call [phone].

## 2017-08-17 LAB
25(OH)D3+25(OH)D2 SERPL-MCNC: 59.5 NG/ML (ref 30–100)
ALBUMIN SERPL-MCNC: 4.7 G/DL (ref 3.6–4.8)
ALBUMIN/GLOB SERPL: 2 {RATIO} (ref 1.2–2.2)
ALP SERPL-CCNC: 109 IU/L (ref 39–117)
ALT SERPL-CCNC: 36 IU/L (ref 0–32)
APPEARANCE UR: CLEAR
AST SERPL-CCNC: 27 IU/L (ref 0–40)
BACTERIA #/AREA URNS HPF: ABNORMAL /[HPF]
BASOPHILS # BLD AUTO: 0 X10E3/UL (ref 0–0.2)
BASOPHILS NFR BLD AUTO: 0 %
BILIRUB SERPL-MCNC: 0.5 MG/DL (ref 0–1.2)
BILIRUB UR QL STRIP: NEGATIVE
BUN SERPL-MCNC: 23 MG/DL (ref 8–27)
BUN/CREAT SERPL: 22 (ref 12–28)
CALCIUM SERPL-MCNC: 9.8 MG/DL (ref 8.7–10.3)
CASTS URNS QL MICRO: ABNORMAL /LPF
CHLORIDE SERPL-SCNC: 101 MMOL/L (ref 96–106)
CHOLEST SERPL-MCNC: 179 MG/DL (ref 100–199)
CO2 SERPL-SCNC: 22 MMOL/L (ref 18–29)
COLOR UR: YELLOW
CREAT SERPL-MCNC: 1.04 MG/DL (ref 0.57–1)
EOSINOPHIL # BLD AUTO: 0.3 X10E3/UL (ref 0–0.4)
EOSINOPHIL NFR BLD AUTO: 3 %
EPI CELLS #/AREA URNS HPF: ABNORMAL /HPF
ERYTHROCYTE [DISTWIDTH] IN BLOOD BY AUTOMATED COUNT: 13.4 % (ref 12.3–15.4)
GLOBULIN SER CALC-MCNC: 2.4 G/DL (ref 1.5–4.5)
GLUCOSE SERPL-MCNC: 84 MG/DL (ref 65–99)
GLUCOSE UR QL: NEGATIVE
HCT VFR BLD AUTO: 38.3 % (ref 34–46.6)
HDLC SERPL-MCNC: 71 MG/DL
HGB BLD-MCNC: 12.7 G/DL (ref 11.1–15.9)
HGB UR QL STRIP: NEGATIVE
IMM GRANULOCYTES # BLD: 0.1 X10E3/UL (ref 0–0.1)
IMM GRANULOCYTES NFR BLD: 1 %
INTERPRETATION, 910389: NORMAL
INTERPRETATION: NORMAL
KETONES UR QL STRIP: NEGATIVE
LDLC SERPL CALC-MCNC: 81 MG/DL (ref 0–99)
LEUKOCYTE ESTERASE UR QL STRIP: ABNORMAL
LYMPHOCYTES # BLD AUTO: 2.3 X10E3/UL (ref 0.7–3.1)
LYMPHOCYTES NFR BLD AUTO: 26 %
MCH RBC QN AUTO: 28.5 PG (ref 26.6–33)
MCHC RBC AUTO-ENTMCNC: 33.2 G/DL (ref 31.5–35.7)
MCV RBC AUTO: 86 FL (ref 79–97)
MICRO URNS: ABNORMAL
MONOCYTES # BLD AUTO: 0.7 X10E3/UL (ref 0.1–0.9)
MONOCYTES NFR BLD AUTO: 8 %
MUCOUS THREADS URNS QL MICRO: PRESENT
NEUTROPHILS # BLD AUTO: 5.5 X10E3/UL (ref 1.4–7)
NEUTROPHILS NFR BLD AUTO: 62 %
NITRITE UR QL STRIP: NEGATIVE
PDF IMAGE, 910387: NORMAL
PH UR STRIP: 7 [PH] (ref 5–7.5)
PLATELET # BLD AUTO: 264 X10E3/UL (ref 150–379)
POTASSIUM SERPL-SCNC: 4.9 MMOL/L (ref 3.5–5.2)
PROT SERPL-MCNC: 7.1 G/DL (ref 6–8.5)
PROT UR QL STRIP: NEGATIVE
RBC # BLD AUTO: 4.46 X10E6/UL (ref 3.77–5.28)
RBC #/AREA URNS HPF: ABNORMAL /HPF
SODIUM SERPL-SCNC: 141 MMOL/L (ref 134–144)
SP GR UR: 1.01 (ref 1–1.03)
T4 FREE SERPL-MCNC: 1.09 NG/DL (ref 0.82–1.77)
TRIGL SERPL-MCNC: 137 MG/DL (ref 0–149)
TSH SERPL DL<=0.005 MIU/L-ACNC: 1.58 UIU/ML (ref 0.45–4.5)
UROBILINOGEN UR STRIP-MCNC: 0.2 MG/DL (ref 0.2–1)
VLDLC SERPL CALC-MCNC: 27 MG/DL (ref 5–40)
WBC # BLD AUTO: 8.9 X10E3/UL (ref 3.4–10.8)
WBC #/AREA URNS HPF: ABNORMAL /HPF

## 2017-09-10 NOTE — PROGRESS NOTES
1) cholesterol much improved,continue current regimen. 2) renal function elevated---pt was likely fasting recheck in 1week  Pt needs to be well hydrated place future order for BMP please    3)3+ leucocytes- likely urinary contaminant we need to recheck the urine also    Please call and infomr pt.

## 2017-09-11 NOTE — PROGRESS NOTES
[phone] (home)   Attempted to call patient no answer left message to call me back in regards to labs

## 2017-09-29 NOTE — PROGRESS NOTES
Attempted to call patient multiple times no answer send letter to call us back in regards to her labs

## 2017-10-24 DIAGNOSIS — D72.829 LEUKOCYTOSIS, UNSPECIFIED TYPE: Primary | ICD-10-CM

## 2017-10-24 DIAGNOSIS — R79.89 ELEVATED SERUM CREATININE: ICD-10-CM

## 2017-10-24 NOTE — PROGRESS NOTES
Patient called back verified   Patient notified of above note and voiced understanding of what was read.   Order urinalysis and bmp ok per Dr Rabia Jama

## 2017-12-12 NOTE — TELEPHONE ENCOUNTER
Refill request:   Requested Prescriptions     Pending Prescriptions Disp Refills    atorvastatin (LIPITOR) 20 mg tablet 30 Tab 3     Sig: Take 1 Tab by mouth daily.            Need 90 day supply-

## 2017-12-15 RX ORDER — ATORVASTATIN CALCIUM 20 MG/1
20 TABLET, FILM COATED ORAL DAILY
Qty: 90 TAB | Refills: 1 | Status: SHIPPED | OUTPATIENT
Start: 2017-12-15 | End: 2018-05-27 | Stop reason: SDUPTHER

## 2018-03-07 ENCOUNTER — OFFICE VISIT (OUTPATIENT)
Dept: FAMILY MEDICINE CLINIC | Age: 68
End: 2018-03-07

## 2018-03-07 VITALS
OXYGEN SATURATION: 96 % | SYSTOLIC BLOOD PRESSURE: 128 MMHG | BODY MASS INDEX: 35.19 KG/M2 | RESPIRATION RATE: 18 BRPM | WEIGHT: 191.2 LBS | HEART RATE: 77 BPM | DIASTOLIC BLOOD PRESSURE: 76 MMHG | TEMPERATURE: 96.2 F | HEIGHT: 62 IN

## 2018-03-07 DIAGNOSIS — R79.89 ELEVATED SERUM CREATININE: ICD-10-CM

## 2018-03-07 DIAGNOSIS — E87.5 SERUM POTASSIUM ELEVATED: ICD-10-CM

## 2018-03-07 DIAGNOSIS — Z23 ENCOUNTER FOR IMMUNIZATION: ICD-10-CM

## 2018-03-07 DIAGNOSIS — M17.11 PRIMARY OSTEOARTHRITIS OF RIGHT KNEE: ICD-10-CM

## 2018-03-07 DIAGNOSIS — F33.8 SEASONAL AFFECTIVE DISORDER (HCC): ICD-10-CM

## 2018-03-07 DIAGNOSIS — E78.2 MIXED HYPERLIPIDEMIA: Primary | ICD-10-CM

## 2018-03-07 DIAGNOSIS — E66.9 OBESITY, CLASS I, BMI 30-34.9: ICD-10-CM

## 2018-03-07 RX ORDER — MELOXICAM 15 MG/1
TABLET ORAL
Qty: 90 TAB | Refills: 1 | Status: SHIPPED | OUTPATIENT
Start: 2018-03-07 | End: 2018-04-15 | Stop reason: SDUPTHER

## 2018-03-07 RX ORDER — LURASIDONE HYDROCHLORIDE 20 MG/1
TABLET, FILM COATED ORAL
Refills: 1 | COMMUNITY
Start: 2018-03-02 | End: 2019-03-06

## 2018-03-07 NOTE — ASSESSMENT & PLAN NOTE
Stable, based on history, physical exam and review of pertinent labs, studies and medications; meds reconciled; continue current treatment plan. Key Psychotherapeutic Meds             LATUDA 20 mg tab tablet  (Taking) take 1/4 tab ( 5mg ) q day    escitalopram oxalate (LEXAPRO) 20 mg tablet  (Taking) Take 1 Tab by mouth daily. Indications: MAJOR DEPRESSIVE DISORDER    busPIRone (BUSPAR) 10 mg tablet  (Taking) Take 1 Tab by mouth two (2) times a day. Indications: GENERALIZED ANXIETY DISORDER        Other [address] [place] [address]     The patient is on no other behavioral health meds.         Lab Results   Component Value Date/Time    Sodium 141 08/16/2017 12:34 PM    Creatinine 1.04 08/16/2017 12:34 PM    TSH 1.580 08/16/2017 12:34 PM    WBC 8.9 08/16/2017 12:34 PM    ALT (SGPT) 36 08/16/2017 12:34 PM    AST (SGOT) 27 08/16/2017 12:34 PM

## 2018-03-07 NOTE — MR AVS SNAPSHOT
[address] [place] [address] 
[phone] Patient: Sonal Munson MRN: [id number] FTK:10/28/8240 Visit Information Date & Time Provider Department Dept. Phone Encounter #  
 3/7/2018  1:45 PM Richard Gibson MD [address] [place] [address] [phone] [id number] Follow-up Instructions Return in about 6 months (around 9/7/2018) for hyperlipidemia follow up. Upcoming Health Maintenance Date Due COLONOSCOPY 6/10/2017 BREAST CANCER SCRN MAMMOGRAM 8/17/2017 GLAUCOMA SCREENING Q2Y 12/31/2017 Pneumococcal 65+ High/Highest Risk (1 of 2 - PCV13) 12/31/2017 MEDICARE YEARLY EXAM 2/14/2019 DTaP/Tdap/Td series (2 - Td) 3/25/2025 Allergies as of 3/7/2018  Review Complete On: 3/7/2018 By: Richard Gibson MD  
 No Known Allergies Current Immunizations  Reviewed on 3/7/2018 Name Date Influenza Vaccine 10/8/2014 Tdap 3/25/2015 Reviewed by Richard Gibson MD on 3/7/2018 at  2:09 PM  
You Were Diagnosed With   
  
 Codes Comments Mixed hyperlipidemia    -  Primary ICD-10-CM: U06.7 ICD-9-CM: 272.2 Obesity, Class I, BMI 30-34.9     ICD-10-CM: E66.9 ICD-9-CM: 278.00 Encounter for immunization     ICD-10-CM: I63 ICD-9-CM: V03.89 Primary osteoarthritis of right knee     ICD-10-CM: M17.11 ICD-9-CM: 715.16 Seasonal affective disorder ([facility] [address]     ICD-10-CM: F33.9 ICD-9-CM: 296.99 Vitals BP Pulse Temp Resp Height(growth percentile) Weight(growth percentile) 128/76 (BP 1 Location: Right arm, BP Patient Position: Sitting) 77 96.2 °F (35.7 °C) (Oral) 18 5' 2\" (1.575 m) 191 lb 3.2 oz (86.7 kg) SpO2 BMI OB Status Smoking Status 96% 34.97 kg/m2 Hysterectomy Former Smoker Vitals History BMI and BSA Data Body Mass Index Body Surface Area 34.97 kg/m 2 1.95 m 2 Preferred Pharmacy Pharmacy Name Phone [id number] [phone] Your Updated Medication List  
  
   
This list is accurate as of 3/7/18  2:22 PM.  Always use your most recent med list.  
  
  
  
  
 albuterol 90 mcg/actuation inhaler Commonly known as:  PROVENTIL HFA, VENTOLIN HFA, PROAIR HFA Take 2 puffs by inhalation every four (4) hours as needed for Wheezing. ALLEGRA-D 12 HOUR PO Take 1 Tab by mouth daily as needed (Allergies). Indications: SEASONAL ALLERGIES  
  
 atorvastatin 20 mg tablet Commonly known as:  LIPITOR Take 1 Tab by mouth daily. busPIRone 10 mg tablet Commonly known as:  BUSPAR Take 1 Tab by mouth two (2) times a day. Indications: GENERALIZED ANXIETY DISORDER  
  
 CALCIUM + D PO Take 1 Tab by mouth daily. Indications: SUPPLEMENT  
  
 escitalopram oxalate 20 mg tablet Commonly known as:  [place] Take 1 Tab by mouth daily. Indications: MAJOR DEPRESSIVE DISORDER  
  
 LATUDA 20 mg Tab tablet Generic drug:  lurasidone  
take 1/4 tab ( 5mg ) q day  
  
 magnesium oxide 400 mg tablet Commonly known as:  MAG-OX Take 400 mg by mouth daily. meloxicam 15 mg tablet Commonly known as:  MOBIC  
TAKE 1 TABLET DAILY FOR OSTEOARTHRITIS MULTIPLE VITAMIN PO Take 1 Tab by mouth daily. Indications: VITAMIN DEFICIENCY PREVENTION  
  
 omega-3 fatty acids-vitamin e 1,000 mg Cap Take 2 Caps by mouth.  
  
 pneumococcal 13 marylin conj dip 0.5 mL Syrg injection Commonly known as:  PREVNAR-13  
0.5 mL by IntraMUSCular route once for 1 dose. Prescriptions Sent to Pharmacy Refills  
 pneumococcal 13 marylin conj dip (PREVNAR-13) 0.5 mL syrg injection 0 Si.5 mL by IntraMUSCular route once for 1 dose. Class: Normal  
 Pharmacy: [address] [place] [address] Ph #: [phone] Route: IntraMUSCular  
 meloxicam (MOBIC) 15 mg tablet 1 Sig: TAKE 1 TABLET DAILY FOR OSTEOARTHRITIS Class: Normal  
 Pharmacy: [address] [place] [address] #: [phone] We Performed the Following LIPID PANEL [83990 CPT(R)] METABOLIC PANEL, COMPREHENSIVE [20249 CPT(R)] Follow-up Instructions Return in about 6 months (around 9/7/2018) for hyperlipidemia follow up. Patient Instructions Body Mass Index: Care Instructions Your Care Instructions Body mass index (BMI) can help you see if your weight is raising your risk for health problems. It uses a formula to compare how much you weigh with how tall you are. · A BMI lower than 18.5 is considered underweight. · A BMI between 18.5 and 24.9 is considered healthy. · A BMI between 25 and 29.9 is considered overweight. A BMI of 30 or higher is considered obese. If your BMI is in the normal range, it means that you have a lower risk for weight-related health problems. If your BMI is in the overweight or obese range, you may be at increased risk for weight-related health problems, such as high blood pressure, heart disease, stroke, arthritis or joint pain, and diabetes. If your BMI is in the underweight range, you may be at increased risk for health problems such as fatigue, lower protection (immunity) against illness, muscle loss, bone loss, hair loss, and hormone problems. BMI is just one measure of your risk for weight-related health problems. You may be at higher risk for health problems if you are not active, you eat an unhealthy diet, or you drink too much alcohol or use tobacco products. Follow-up care is a key part of your treatment and safety. Be sure to make and go to all appointments, and call your doctor if you are having problems. It's also a good idea to know your test results and keep a list of the medicines you take. How can you care for yourself at home? · Practice healthy eating habits.  This includes eating plenty of fruits, vegetables, whole grains, lean protein, and low-fat dairy. · If your doctor recommends it, get more exercise. Walking is a good choice. Bit by bit, increase the amount you walk every day. Try for at least 30 minutes on most days of the week. · Do not smoke. Smoking can increase your risk for health problems. If you need help quitting, talk to your doctor about stop-smoking programs and medicines. These can increase your chances of quitting for good. · Limit alcohol to 2 drinks a day for men and 1 drink a day for women. Too much alcohol can cause health problems. If you have a BMI higher than 25 · Your doctor may do other tests to check your risk for weight-related health problems. This may include measuring the distance around your waist. A waist measurement of more than 40 inches in men or 35 inches in women can increase the risk of weight-related health problems. · Talk with your doctor about steps you can take to stay healthy or improve your health. You may need to make lifestyle changes to lose weight and stay healthy, such as changing your diet and getting regular exercise. If you have a BMI lower than 18.5 · Your doctor may do other tests to check your risk for health problems. · Talk with your doctor about steps you can take to stay healthy or improve your health. You may need to make lifestyle changes to gain or maintain weight and stay healthy, such as getting more healthy foods in your diet and doing exercises to build muscle. Where can you learn more? Go to http://lorna-darryl.info/. Enter S176 in the search box to learn more about \"Body Mass Index: Care Instructions. \" Current as of: October 13, 2016 Content Version: 11.4 © 7318-5301 Healthwise, Incorporated. Care instructions adapted under license by [employer] (which disclaims liability or warranty for this information).  If you have questions about a medical condition or this instruction, always ask your healthcare professional. [address] any warranty or liability for your use of this information. Introducing [facility]! Dear Holland Khanna: 
Thank you for requesting a [employer] account. Our records indicate that you already have an active [employer] account. You can access your account anytime at [email or web address] Did you know that you can access your hospital and ER discharge instructions at any time in [employer]? You can also review all of your test results from your hospital stay or ER visit. Additional Information If you have questions, please visit the Frequently Asked Questions section of the [employer] website at [email or web address] Remember, [employer] is NOT to be used for urgent needs. For medical emergencies, dial 911. Now available from your iPhone and Android! Please provide this summary of care documentation to your next provider. Your primary care clinician is listed as Becca Meadows. If you have any questions after today's visit, please call [phone].

## 2018-03-07 NOTE — PATIENT INSTRUCTIONS
Body Mass Index: Care Instructions  Your Care Instructions    Body mass index (BMI) can help you see if your weight is raising your risk for health problems. It uses a formula to compare how much you weigh with how tall you are. · A BMI lower than 18.5 is considered underweight. · A BMI between 18.5 and 24.9 is considered healthy. · A BMI between 25 and 29.9 is considered overweight. A BMI of 30 or higher is considered obese. If your BMI is in the normal range, it means that you have a lower risk for weight-related health problems. If your BMI is in the overweight or obese range, you may be at increased risk for weight-related health problems, such as high blood pressure, heart disease, stroke, arthritis or joint pain, and diabetes. If your BMI is in the underweight range, you may be at increased risk for health problems such as fatigue, lower protection (immunity) against illness, muscle loss, bone loss, hair loss, and hormone problems. BMI is just one measure of your risk for weight-related health problems. You may be at higher risk for health problems if you are not active, you eat an unhealthy diet, or you drink too much alcohol or use tobacco products. Follow-up care is a key part of your treatment and safety. Be sure to make and go to all appointments, and call your doctor if you are having problems. It's also a good idea to know your test results and keep a list of the medicines you take. How can you care for yourself at home? · Practice healthy eating habits. This includes eating plenty of fruits, vegetables, whole grains, lean protein, and low-fat dairy. · If your doctor recommends it, get more exercise. Walking is a good choice. Bit by bit, increase the amount you walk every day. Try for at least 30 minutes on most days of the week. · Do not smoke. Smoking can increase your risk for health problems. If you need help quitting, talk to your doctor about stop-smoking programs and medicines. These can increase your chances of quitting for good. · Limit alcohol to 2 drinks a day for men and 1 drink a day for women. Too much alcohol can cause health problems. If you have a BMI higher than 25  · Your doctor may do other tests to check your risk for weight-related health problems. This may include measuring the distance around your waist. A waist measurement of more than 40 inches in men or 35 inches in women can increase the risk of weight-related health problems. · Talk with your doctor about steps you can take to stay healthy or improve your health. You may need to make lifestyle changes to lose weight and stay healthy, such as changing your diet and getting regular exercise. If you have a BMI lower than 18.5  · Your doctor may do other tests to check your risk for health problems. · Talk with your doctor about steps you can take to stay healthy or improve your health. You may need to make lifestyle changes to gain or maintain weight and stay healthy, such as getting more healthy foods in your diet and doing exercises to build muscle. Where can you learn more? Go to http://lorna-darryl.info/. Enter S176 in the search box to learn more about \"Body Mass Index: Care Instructions. \"  Current as of: October 13, 2016  Content Version: 11.4  © 3491-0895 Healthwise, Incorporated. Care instructions adapted under license by [employer] (which disclaims liability or warranty for this information). If you have questions about a medical condition or this instruction, always ask your healthcare professional. [address] any warranty or liability for your use of this information.

## 2018-03-07 NOTE — PROGRESS NOTES
Chief Complaint   Patient presents with    Cholesterol Problem       Reviewed Record in preparation for visit and have obtained necessary documentation. Identified pt with two pt identifiers (Name @ )    Health Maintenance Due   Topic    COLONOSCOPY     Influenza Age 5 to Adult     BREAST CANCER SCRN MAMMOGRAM     GLAUCOMA SCREENING Q2Y     Pneumococcal 65+ High/Highest Risk (1 of 2 - PCV13)         1. Have you been to the ER, urgent care clinic since your last visit? Hospitalized since your last visit? No    2. Have you seen or consulted any other health care providers outside of the [address] [place] [address] since your last visit? Include any pap smears or colon screening.  No

## 2018-03-07 NOTE — PROGRESS NOTES
HISTORY OF PRESENT ILLNESS  Mague Rodriguez is a 74 y.o. female. Blood pressure 128/76, pulse 77, temperature 96.2 °F (35.7 °C), temperature source Oral, resp. rate 18, height 5' 2\" (1.575 m), weight 191 lb 3.2 oz (86.7 kg), SpO2 96 %. Body mass index is 34.97 kg/(m^2). Chief Complaint   Patient presents with    Cholesterol Problem        HPI  Mague Rodriguez 74 y.o. female  presents to the office today for cholesterol follow up. Hyperlipidemia: Lipid panel on 08/16/17 notable for total cholesterol 179, LDL 81, HDL 71, and triglycerides 137. Pt continues with Atorvastatin 20mg daily. Presumed stable, will assess levels today. Anxiety: Pt continues with Latuda 20mg quarter tablet daily, Lexapro 20mg daily and Buspar 10mg BID. She states she is doing well on her current regimen. Health maintenance: Advised pt that she is due for a colonoscopy, will send pt to Dr. Farhat Linton (GI). Pt states that she had her mammogram in 08/2017. She has not followed up with Dr. Olegario Dixon (OB-GYN) yet, but knows she needs to make an appointment. Pt states that she had her eye exam in 08/2017. Advised pt that she is due for her pneumonia vaccines. Current Outpatient Prescriptions   Medication Sig Dispense Refill    LATUDA 20 mg tab tablet take 1/4 tab ( 5mg ) q day  1    pneumococcal 13 marylin conj dip (PREVNAR-13) 0.5 mL syrg injection 0.5 mL by IntraMUSCular route once for 1 dose. 0.5 mL 0    meloxicam (MOBIC) 15 mg tablet TAKE 1 TABLET DAILY FOR OSTEOARTHRITIS 90 Tab 1    atorvastatin (LIPITOR) 20 mg tablet Take 1 Tab by mouth daily. 90 Tab 1    magnesium oxide (MAG-OX) 400 mg tablet Take 400 mg by mouth daily.  omega-3 fatty acids-vitamin e 1,000 mg cap Take 2 Caps by mouth.  escitalopram oxalate (LEXAPRO) 20 mg tablet Take 1 Tab by mouth daily. Indications: MAJOR DEPRESSIVE DISORDER 30 Tab 0    busPIRone (BUSPAR) 10 mg tablet Take 1 Tab by mouth two (2) times a day.  Indications: GENERALIZED ANXIETY DISORDER 60 Tab 1    albuterol (PROVENTIL HFA, VENTOLIN HFA, PROAIR HFA) 90 mcg/actuation inhaler Take 2 puffs by inhalation every four (4) hours as needed for Wheezing. 1 Inhaler 0    CALCIUM CARBONATE/VITAMIN D3 (CALCIUM + D PO) Take 1 Tab by mouth daily. Indications: SUPPLEMENT      FEXOFENADINE/PSEUDOEPHEDRINE (ALLEGRA-D 12 HOUR PO) Take 1 Tab by mouth daily as needed (Allergies). Indications: SEASONAL ALLERGIES      MULTIVITAMINS (MULTIPLE VITAMIN PO) Take 1 Tab by mouth daily. Indications: VITAMIN DEFICIENCY PREVENTION       No Known Allergies  Past Medical History:   Diagnosis Date    Anxiety     AR (allergic rhinitis)     Depression     Spondylosis      Past Surgical History:   Procedure Laterality Date    HX HYSTERECTOMY       Family History   Problem Relation Age of Onset    Cancer Mother 80     lyphoma    Lung Disease Father     Heart Disease Father 80     a fib    Other Brother 40     Amlydosis     Social History   Substance Use Topics    Smoking status: Former Smoker     Years: 30.00     Quit date: 9/9/2003    Smokeless tobacco: Never Used    Alcohol use 0.5 oz/week     1 Glasses of wine per week      Comment: ocassional        Review of Systems   Constitutional: Negative. Negative for malaise/fatigue. Eyes: Negative for blurred vision. Respiratory: Negative for shortness of breath. Cardiovascular: Negative for chest pain and leg swelling. Musculoskeletal: Negative. Neurological: Negative. Negative for dizziness and headaches. All other systems reviewed and are negative. Physical Exam   Constitutional: She is oriented to person, place, and time. She appears well-developed and well-nourished. No distress. HENT:   Head: Normocephalic and atraumatic. Neck: Carotid bruit is not present. Cardiovascular: Normal rate, regular rhythm, normal heart sounds and intact distal pulses. Exam reveals no gallop and no friction rub. No murmur heard.   Pulmonary/Chest: Effort normal and breath sounds normal. No respiratory distress. She has no wheezes. She has no rales. Musculoskeletal: She exhibits no edema. Neurological: She is alert and oriented to person, place, and time. Skin: She is not diaphoretic. Psychiatric: She has a normal mood and affect. Her behavior is normal. Judgment and thought content normal.   Nursing note and vitals reviewed. ASSESSMENT and PLAN  Diagnoses and all orders for this visit:    1. Mixed hyperlipidemia  -     LIPID PANEL  -     METABOLIC PANEL, COMPREHENSIVE  - Lipid panel on 08/16/17 notable for total cholesterol 179, LDL 81, HDL 71, and triglycerides 137. Pt continues with Atorvastatin 20mg daily. Presumed stable, will assess levels today. 2. Obesity, Class I, BMI 30-34.9        - I have reviewed/discussed the above normal BMI with the patient. I have recommended the following interventions: dietary management education, guidance, and counseling, encourage exercise and monitor weight . 3. Encounter for immunization  -     pneumococcal 13 marylin conj dip (PREVNAR-13) 0.5 mL syrg injection; 0.5 mL by IntraMUSCular route once for 1 dose. 4. Primary osteoarthritis of right knee  -     meloxicam (MOBIC) 15 mg tablet; TAKE 1 TABLET DAILY FOR OSTEOARTHRITIS  - Stable, continue current regimen. 5. Seasonal affective disorder [facility])  Assessment & Plan:  Stable, based on history, physical exam and review of pertinent labs, studies and medications; meds reconciled; continue current treatment plan. Key Psychotherapeutic Meds             LATUDA 20 mg tab tablet  (Taking) take 1/4 tab ( 5mg ) q day    escitalopram oxalate (LEXAPRO) 20 mg tablet  (Taking) Take 1 Tab by mouth daily. Indications: MAJOR DEPRESSIVE DISORDER    busPIRone (BUSPAR) 10 mg tablet  (Taking) Take 1 Tab by mouth two (2) times a day.  Indications: GENERALIZED ANXIETY DISORDER        Other [address]     The patient is on no other behavioral health meds.        Lab Results   Component Value Date/Time    Sodium 141 08/16/2017 12:34 PM    Creatinine 1.04 08/16/2017 12:34 PM    TSH 1.580 08/16/2017 12:34 PM    WBC 8.9 08/16/2017 12:34 PM    ALT (SGPT) 36 08/16/2017 12:34 PM    AST (SGOT) 27 08/16/2017 12:34 PM       Follow-up Disposition:  Return in about 6 months (around 9/7/2018) for hyperlipidemia follow up. Medication risks/benefits/costs/interactions/alternatives discussed with patient. Advised patient to call back or return to office if symptoms worsen/change/persist.  If patient cannot reach us or should anything more severe/urgent arise he/she should proceed directly to the nearest emergency department. Discussed expected course/resolution/complications of diagnosis in detail with patient. Patient given a written after visit summary which includes her diagnoses, current medications and vitals. Patient expressed understanding with the diagnosis and plan. Written by khari Magdaleno, as dictated by Kimberly Buitrago M.D.   I have reviewed and agree with the above note and have made corrections where appropriate, Dr. Saba Cardoza MD

## 2018-03-07 NOTE — LETTER
3/7/2018 4:48 PM 
 
Ms. Liam Henriquez [address]. To whom it may concern: 
 
 
Patient  Beverly Richardson 1950 was seen in our office today and per patient she/he was seen for Mammogram examination at your office can we have his/her results fax to our office so we can update his/her chart [phone]. If you have any question please don't hesitate to call us back. Sincerely, Marcelino Nichols MD

## 2018-03-07 NOTE — LETTER
3/7/2018 4:44 PM 
 
Ms. Raoul Guevara [address]. To whom it may concern: 
 
 
Patient Federico Jade 1950 was seen in our office today and per patient she/he was seen for eye examination at your office can we have his/her results fax to our office so we can update his/her chart [phone]. If you have any question please don't hesitate to call us back. Sincerely, Elidia Nelson MD

## 2018-03-08 LAB
ALBUMIN SERPL-MCNC: 4.4 G/DL (ref 3.6–4.8)
ALBUMIN/GLOB SERPL: 1.8 {RATIO} (ref 1.2–2.2)
ALP SERPL-CCNC: 88 IU/L (ref 39–117)
ALT SERPL-CCNC: 29 IU/L (ref 0–32)
AST SERPL-CCNC: 25 IU/L (ref 0–40)
BILIRUB SERPL-MCNC: 0.4 MG/DL (ref 0–1.2)
BUN SERPL-MCNC: 29 MG/DL (ref 8–27)
BUN/CREAT SERPL: 28 (ref 12–28)
CALCIUM SERPL-MCNC: 9.8 MG/DL (ref 8.7–10.3)
CHLORIDE SERPL-SCNC: 104 MMOL/L (ref 96–106)
CHOLEST SERPL-MCNC: 189 MG/DL (ref 100–199)
CO2 SERPL-SCNC: 26 MMOL/L (ref 18–29)
CREAT SERPL-MCNC: 1.05 MG/DL (ref 0.57–1)
GFR SERPLBLD CREATININE-BSD FMLA CKD-EPI: 56 ML/MIN/1.73
GFR SERPLBLD CREATININE-BSD FMLA CKD-EPI: 64 ML/MIN/1.73
GLOBULIN SER CALC-MCNC: 2.5 G/DL (ref 1.5–4.5)
GLUCOSE SERPL-MCNC: 94 MG/DL (ref 65–99)
HDLC SERPL-MCNC: 73 MG/DL
INTERPRETATION, 910389: NORMAL
INTERPRETATION: NORMAL
LDLC SERPL CALC-MCNC: 82 MG/DL (ref 0–99)
PDF IMAGE, 910387: NORMAL
POTASSIUM SERPL-SCNC: 5.6 MMOL/L (ref 3.5–5.2)
PROT SERPL-MCNC: 6.9 G/DL (ref 6–8.5)
SODIUM SERPL-SCNC: 145 MMOL/L (ref 134–144)
TRIGL SERPL-MCNC: 170 MG/DL (ref 0–149)
VLDLC SERPL CALC-MCNC: 34 MG/DL (ref 5–40)

## 2018-03-19 NOTE — PROGRESS NOTES
Place order for bmp( future)     1) renal function slightly elevated, so is potassium. We need to recheck bmp in 2 weeks ask her to be well hydrated.      2) lipid panel is at goal.      See you as advised    Any question set me know

## 2018-03-19 NOTE — PROGRESS NOTES
[phone] attempted to call patient no answer left message to call me back in regards to her labs  Bmp order per Dr Nikia mantilla to order

## 2018-04-04 ENCOUNTER — TELEPHONE (OUTPATIENT)
Dept: FAMILY MEDICINE CLINIC | Age: 68
End: 2018-04-04

## 2018-04-04 NOTE — TELEPHONE ENCOUNTER
Called patient and lm to call back re her labs. Will close this encounter since lab encounter is pending .

## 2018-04-04 NOTE — TELEPHONE ENCOUNTER
Patient is returning the call from 3/19/18  Regarding her lab results  She is requesting a call back  Patients best call back  : [phone]  Patient last seen : March 07, 2018 by Dr Manuel Gottlieb

## 2018-04-04 NOTE — TELEPHONE ENCOUNTER
Patient is returning call back to office that she missed.   She states she can be reached back and one of the two numbers provided below      Best call [phone] or [phone]

## 2018-04-05 DIAGNOSIS — E87.5 SERUM POTASSIUM ELEVATED: ICD-10-CM

## 2018-04-05 DIAGNOSIS — R79.89 ELEVATED SERUM CREATININE: ICD-10-CM

## 2018-04-05 NOTE — LETTER
4/18/2018 2:58 PM 
 
Ms. Zohaib Proctor 71. Dear Zohaib Kim: Please find your most recent results below. Resulted Orders METABOLIC PANEL, BASIC Result Value Ref Range Glucose 86 65 - 99 mg/dL BUN 22 8 - 27 mg/dL Creatinine 0.90 0.57 - 1.00 mg/dL GFR est non-AA 67 >59 mL/min/1.73 GFR est AA 78 >59 mL/min/1.73  
 BUN/Creatinine ratio 24 12 - 28 Sodium 142 134 - 144 mmol/L Potassium 5.1 3.5 - 5.2 mmol/L Chloride 103 96 - 106 mmol/L  
 CO2 26 18 - 29 mmol/L Calcium 9.5 8.7 - 10.3 mg/dL Narrative Performed at:  [address]  [id number] : Yosvany Leblanc MD, Phone:  [phone] Renal function stable Potassium stable Sodium stable Please call me if you have any questions: [phone] Sincerely, Reji Lovell MD

## 2018-04-06 LAB
BUN SERPL-MCNC: 22 MG/DL (ref 8–27)
BUN/CREAT SERPL: 24 (ref 12–28)
CALCIUM SERPL-MCNC: 9.5 MG/DL (ref 8.7–10.3)
CHLORIDE SERPL-SCNC: 103 MMOL/L (ref 96–106)
CO2 SERPL-SCNC: 26 MMOL/L (ref 18–29)
CREAT SERPL-MCNC: 0.9 MG/DL (ref 0.57–1)
GFR SERPLBLD CREATININE-BSD FMLA CKD-EPI: 67 ML/MIN/1.73
GFR SERPLBLD CREATININE-BSD FMLA CKD-EPI: 78 ML/MIN/1.73
GLUCOSE SERPL-MCNC: 86 MG/DL (ref 65–99)
POTASSIUM SERPL-SCNC: 5.1 MMOL/L (ref 3.5–5.2)
SODIUM SERPL-SCNC: 142 MMOL/L (ref 134–144)

## 2018-04-15 DIAGNOSIS — M17.11 PRIMARY OSTEOARTHRITIS OF RIGHT KNEE: ICD-10-CM

## 2018-04-17 RX ORDER — MELOXICAM 15 MG/1
TABLET ORAL
Qty: 90 TAB | Refills: 1 | Status: SHIPPED | OUTPATIENT
Start: 2018-04-17 | End: 2018-10-14 | Stop reason: SDUPTHER

## 2018-05-27 RX ORDER — ATORVASTATIN CALCIUM 20 MG/1
TABLET, FILM COATED ORAL
Qty: 90 TAB | Refills: 1 | Status: SHIPPED | OUTPATIENT
Start: 2018-05-27 | End: 2018-11-23 | Stop reason: SDUPTHER

## 2018-09-05 ENCOUNTER — OFFICE VISIT (OUTPATIENT)
Dept: FAMILY MEDICINE CLINIC | Age: 68
End: 2018-09-05

## 2018-09-05 VITALS
RESPIRATION RATE: 18 BRPM | HEART RATE: 71 BPM | DIASTOLIC BLOOD PRESSURE: 73 MMHG | TEMPERATURE: 98 F | SYSTOLIC BLOOD PRESSURE: 109 MMHG | BODY MASS INDEX: 32.57 KG/M2 | HEIGHT: 62 IN | OXYGEN SATURATION: 97 % | WEIGHT: 177 LBS

## 2018-09-05 DIAGNOSIS — E66.9 OBESITY, CLASS I, BMI 30-34.9: ICD-10-CM

## 2018-09-05 DIAGNOSIS — Z51.81 MEDICATION MONITORING ENCOUNTER: ICD-10-CM

## 2018-09-05 DIAGNOSIS — Z23 ENCOUNTER FOR IMMUNIZATION: ICD-10-CM

## 2018-09-05 DIAGNOSIS — E78.2 MIXED HYPERLIPIDEMIA: Primary | ICD-10-CM

## 2018-09-05 RX ORDER — SODIUM, POTASSIUM,MAG SULFATES 17.5-3.13G
SOLUTION, RECONSTITUTED, ORAL ORAL
COMMUNITY
Start: 2018-08-25 | End: 2019-03-06

## 2018-09-05 NOTE — PATIENT INSTRUCTIONS

## 2018-09-05 NOTE — ACP (ADVANCE CARE PLANNING)
Advance Care Planning (ACP) Provider Note - Comprehensive     Date of ACP Conversation: 09/05/18  Persons included in Conversation:  patient  Length of ACP Conversation in minutes:  <16 minutes (Non-Billable)                General ACP for ALL Patients with Decision Making Capacity:   Importance of advance care planning, including choosing a healthcare agent to communicate patient's healthcare decisions if patient lost the ability to make decisions, such as after a sudden illness or accident  Understanding of the healthcare agent role was assessed and information provided      For Serious or Chronic Illness:  No known illness    Interventions Provided:  Recommended completion of Advance Directive form after review of ACP materials and conversation with prospective healthcare agent   Referral made for ACP follow-up assistance to:  nurse

## 2018-09-05 NOTE — PROGRESS NOTES
HISTORY OF PRESENT ILLNESS Jeff Sol is a 74 y.o. female. Blood pressure 109/73, pulse 71, temperature 98 °F (36.7 °C), temperature source Oral, resp. rate 18, height 5' 2\" (1.575 m), weight 177 lb (80.3 kg), SpO2 97 %. Body mass index is 32.37 kg/(m^2). Chief Complaint Patient presents with  Cholesterol Problem  
  follow up HPI Jeff Sol 74 y.o. female  presents to the office today for cholesterol f/u. Hyperlipidemia: Lipid panel on 3/7/2018 notable for total cholesterol 189, LDL 82, HDL 73, and triglycerides 170. Pt continues with Atorvastatin 20mg daily. Presumed stable, will assess levels today. Health Maintenance: Pt reports that she has her mammogram and colonoscopy scheduled to be completed within next couple of months. Recommended that pt receive Prevnar 13 vaccination. Pt notes that Dr. Hollis Lopez (psych) wants to see her lab results for prolactin, CMP,  A1c, glucose, and cholesterol. Patient given [facility] Directive form and booklet. Patient advised to follow up with me or contact nurse navigator to submit these form to medical chart. I advised Patient of the benefits of Advance Care Plan with regard to end of life care and benefits of filling forms out in case Patient cannot speak for themselves. Current Outpatient Prescriptions Medication Sig Dispense Refill  SUPREP BOWEL PREP KIT 17.5-3.13-1.6 gram solr oral solution  atorvastatin (LIPITOR) 20 mg tablet TAKE 1 TABLET DAILY 90 Tab 1  
 meloxicam (MOBIC) 15 mg tablet TAKE 1 TABLET DAILY FOR OSTEOARTHRITIS 90 Tab 1  
 LATUDA 20 mg tab tablet take 1/4 tab ( 5mg ) q day  1  
 magnesium oxide (MAG-OX) 400 mg tablet Take 400 mg by mouth daily.  omega-3 fatty acids-vitamin e 1,000 mg cap Take 2 Caps by mouth.  escitalopram oxalate (LEXAPRO) 20 mg tablet Take 1 Tab by mouth daily.  Indications: MAJOR DEPRESSIVE DISORDER 30 Tab 0  
  busPIRone (BUSPAR) 10 mg tablet Take 1 Tab by mouth two (2) times a day. Indications: GENERALIZED ANXIETY DISORDER 60 Tab 1  
 albuterol (PROVENTIL HFA, VENTOLIN HFA, PROAIR HFA) 90 mcg/actuation inhaler Take 2 puffs by inhalation every four (4) hours as needed for Wheezing. 1 Inhaler 0  
 CALCIUM CARBONATE/VITAMIN D3 (CALCIUM + D PO) Take 1 Tab by mouth daily. Indications: SUPPLEMENT  FEXOFENADINE/PSEUDOEPHEDRINE (ALLEGRA-D 12 HOUR PO) Take 1 Tab by mouth daily as needed (Allergies). Indications: SEASONAL ALLERGIES    
 MULTIVITAMINS (MULTIPLE VITAMIN PO) Take 1 Tab by mouth daily. Indications: VITAMIN DEFICIENCY PREVENTION No Known Allergies Past Medical History:  
Diagnosis Date  Anxiety  AR (allergic rhinitis)  Depression  Spondylosis Past Surgical History:  
Procedure Laterality Date  HX HYSTERECTOMY Family History Problem Relation Age of Onset  Cancer Mother 80  
  lyphoma  Lung Disease Father  Heart Disease Father 80  
  a fib  Other Brother 40 Amlydosis Social History Substance Use Topics  Smoking status: Former Smoker Years: 30.00 Quit date: 9/9/2003  Smokeless tobacco: Never Used  Alcohol use 0.5 oz/week 1 Glasses of wine per week Comment: ocassional  
  
 
Review of Systems Constitutional: Negative. Negative for malaise/fatigue. Eyes: Negative for blurred vision. Respiratory: Negative for shortness of breath. Cardiovascular: Negative for chest pain and leg swelling. Musculoskeletal: Negative. Neurological: Negative. Negative for dizziness and headaches. All other systems reviewed and are negative. Physical Exam  
Constitutional: She is oriented to person, place, and time. She appears well-developed and well-nourished. No distress. HENT:  
Head: Normocephalic and atraumatic. Neck: Carotid bruit is not present.   
Cardiovascular: Normal rate, regular rhythm, normal heart sounds and intact distal pulses. Exam reveals no gallop and no friction rub. No murmur heard. Pulmonary/Chest: Effort normal and breath sounds normal. No respiratory distress. She has no wheezes. She has no rales. Musculoskeletal: She exhibits no edema. Neurological: She is alert and oriented to person, place, and time. Skin: She is not diaphoretic. Psychiatric: She has a normal mood and affect. Her behavior is normal. Judgment and thought content normal.  
Nursing note and vitals reviewed. ASSESSMENT and PLAN Diagnoses and all orders for this visit: 
 
1. Mixed hyperlipidemia -     METABOLIC PANEL, COMPREHENSIVE 
-     LIPID PANEL 
- Lipid panel on 3/7/2018 notable for total cholesterol 189, LDL 82, HDL 73, and triglycerides 170. Pt continues with Atorvastatin 20mg daily. Presumed stable, will assess levels today. 2. Obesity, Class I, BMI 30-34.9 
      - I have reviewed/discussed the above normal BMI with the patient. I have recommended the following interventions: dietary management education, guidance, and counseling, encourage exercise and monitor weight . 3. Encounter for immunization 
-     PNEUMOCOCCAL CONJ VACCINE 13 VALENT IM (Age 48 and over) - Recommended that pt receive Prevnar 13 vaccination. 4. Medication monitoring encounter 
-     PROLACTIN 
-     HEMOGLOBIN A1C WITH EAG 
- Advised pt that I will send her results for prolactin, CMP,  A1c, glucose, and cholesterol to Dr. Teresa Kong (psych). Follow-up Disposition: 
Return in about 6 months (around 3/5/2019). Medication risks/benefits/costs/interactions/alternatives discussed with patient. Advised patient to call back or return to office if symptoms worsen/change/persist.  If patient cannot reach us or should anything more severe/urgent arise he/she should proceed directly to the nearest emergency department. Discussed expected course/resolution/complications of diagnosis in detail with patient. Patient given a written after visit summary which includes her diagnoses, current medications and vitals. Patient expressed understanding with the diagnosis and plan. Written by khari Lora, as dictated by Gus Torres M.D. 
2:10 PM - 2:30 PM 
Total time spent with the patient 20 minutes, greater than 50% of time spent counseling patient.

## 2018-09-05 NOTE — PROGRESS NOTES
Chief Complaint Patient presents with  Cholesterol Problem  
  follow up 1. Have you been to the ER, urgent care clinic since your last visit? Hospitalized since your last visit? No 
 
2. Have you seen or consulted any other health care providers outside of the [address] [place] [address] since your last visit? Include any pap smears or colon screening.  Yes saw Dr. Abdulaziz Bautista (psychologist) 08/13/18 and Dr. Meagan Elena (OBGYN) in 08/29/18 for routine vist

## 2018-09-05 NOTE — MR AVS SNAPSHOT
Marina Farah 
 
 
 [address] [place] [address] [address] [place] [address] 
[phone] Patient: Daxa aDhl MRN: [id number]:[id number] Visit Information Date & Time Provider Department Dept. Phone Encounter #  
 9/5/2018  1:45 PM Samanta Benavides MD [address] [place] [address] [phone] [id number] Follow-up Instructions Return in about 6 months (around 3/5/2019). Upcoming Health Maintenance Date Due COLONOSCOPY 6/10/2017 BREAST CANCER SCRN MAMMOGRAM 8/17/2017 Pneumococcal 65+ High/Highest Risk (1 of 2 - PCV13) 12/31/2017 Influenza Age 5 to Adult 10/1/2018* GLAUCOMA SCREENING Q2Y 8/30/2019 DTaP/Tdap/Td series (2 - Td) 3/25/2025 *Topic was postponed. The date shown is not the original due date. Allergies as of 9/5/2018  Review Complete On: 9/5/2018 By: Samanta Benavides MD  
 No Known Allergies Current Immunizations  Reviewed on 3/7/2018 Name Date Influenza Vaccine 10/8/2014 Pneumococcal Conjugate (PCV-13)  Incomplete Tdap 3/25/2015 Not reviewed this visit You Were Diagnosed With   
  
 Codes Comments Mixed hyperlipidemia    -  Primary ICD-10-CM: S34.4 ICD-9-CM: 272.2 Obesity, Class I, BMI 30-34.9     ICD-10-CM: E66.9 ICD-9-CM: 278.00 Encounter for immunization     ICD-10-CM: S09 ICD-9-CM: V03.89 Medication monitoring encounter     ICD-10-CM: Z51.81 
ICD-9-CM: V58.83 Vitals BP Pulse Temp Resp Height(growth percentile) Weight(growth percentile) 109/73 (BP 1 Location: Right arm, BP Patient Position: Sitting) 71 98 °F (36.7 °C) (Oral) 18 5' 2\" (1.575 m) 177 lb (80.3 kg) SpO2 BMI OB Status Smoking Status 97% 32.37 kg/m2 Hysterectomy Former Smoker BMI and BSA Data Body Mass Index Body Surface Area  
 32.37 kg/m 2 1.87 m 2 Preferred Pharmacy Pharmacy Name Phone [id number] [address] [facility] [phone] Your Updated Medication List  
  
   
This list is accurate as of 9/5/18  2:26 PM.  Always use your most recent med list.  
  
  
  
  
 albuterol 90 mcg/actuation inhaler Commonly known as:  PROVENTIL HFA, VENTOLIN HFA, PROAIR HFA Take 2 puffs by inhalation every four (4) hours as needed for Wheezing. ALLEGRA-D 12 HOUR PO Take 1 Tab by mouth daily as needed (Allergies). Indications: SEASONAL ALLERGIES  
  
 atorvastatin 20 mg tablet Commonly known as:  LIPITOR  
TAKE 1 TABLET DAILY  
  
 busPIRone 10 mg tablet Commonly known as:  BUSPAR Take 1 Tab by mouth two (2) times a day. Indications: GENERALIZED ANXIETY DISORDER  
  
 CALCIUM + D PO Take 1 Tab by mouth daily. Indications: SUPPLEMENT  
  
 escitalopram oxalate 20 mg tablet Commonly known as:  Nikko Frandy Take 1 Tab by mouth daily. Indications: MAJOR DEPRESSIVE DISORDER  
  
 LATUDA 20 mg Tab tablet Generic drug:  lurasidone  
take 1/4 tab ( 5mg ) q day  
  
 magnesium oxide 400 mg tablet Commonly known as:  MAG-OX Take 400 mg by mouth daily. meloxicam 15 mg tablet Commonly known as:  MOBIC  
TAKE 1 TABLET DAILY FOR OSTEOARTHRITIS MULTIPLE VITAMIN PO Take 1 Tab by mouth daily. Indications: VITAMIN DEFICIENCY PREVENTION  
  
 omega-3 fatty acids-vitamin e 1,000 mg Cap Take 2 Caps by mouth. SUPREP BOWEL PREP KIT 17.5-3.13-1.6 gram Solr oral solution Generic drug:  sodium-potassium-mag sulfate We Performed the Following HEMOGLOBIN A1C WITH EAG [16807 CPT(R)] LIPID PANEL [65297 CPT(R)] METABOLIC PANEL, COMPREHENSIVE [40404 CPT(R)] PNEUMOCOCCAL CONJ VACCINE 13 VALENT IM [id number] CPT(R)] PROLACTIN [85501 CPT(R)] Follow-up Instructions Return in about 6 months (around 3/5/2019). Patient Instructions High Cholesterol: Care Instructions Your Care Instructions Cholesterol is a type of fat in your blood.  It is needed for many body functions, such as making new cells. Cholesterol is made by your body. It also comes from food you eat. High cholesterol means that you have too much of the fat in your blood. This raises your risk of a heart attack and stroke. LDL and HDL are part of your total cholesterol. LDL is the \"bad\" cholesterol. High LDL can raise your risk for heart disease, heart attack, and stroke. HDL is the \"good\" cholesterol. It helps clear bad cholesterol from the body. High HDL is linked with a lower risk of heart disease, heart attack, and stroke. Your cholesterol levels help your doctor find out your risk for having a heart attack or stroke. You and your doctor can talk about whether you need to lower your risk and what treatment is best for you. A heart-healthy lifestyle along with medicines can help lower your cholesterol and your risk. The way you choose to lower your risk will depend on how high your risk is for heart attack and stroke. It will also depend on how you feel about taking medicines. Follow-up care is a key part of your treatment and safety. Be sure to make and go to all appointments, and call your doctor if you are having problems. It's also a good idea to know your test results and keep a list of the medicines you take. How can you care for yourself at home? · Eat a variety of foods every day. Good choices include fruits, vegetables, whole grains (like oatmeal), dried beans and peas, nuts and seeds, soy products (like tofu), and fat-free or low-fat dairy products. · Replace butter, margarine, and hydrogenated or partially hydrogenated oils with olive and canola oils. (Canola oil margarine without trans fat is fine.) · Replace red meat with fish, poultry, and soy protein (like tofu). · Limit processed and packaged foods like chips, crackers, and cookies. · Bake, broil, or steam foods. Don't bonner them. · Be physically active.  Get at least 30 minutes of exercise on most days of the week. Walking is a good choice. You also may want to do other activities, such as running, swimming, cycling, or playing tennis or team sports. · Stay at a healthy weight or lose weight by making the changes in eating and physical activity listed above. Losing just a small amount of weight, even 5 to 10 pounds, can reduce your risk for having a heart attack or stroke. · Do not smoke. When should you call for help? Watch closely for changes in your health, and be sure to contact your doctor if: 
  · You need help making lifestyle changes.  
  · You have questions about your medicine. Where can you learn more? Go to http://lornaEidoSearchdarryl.info/. Enter I605 in the search box to learn more about \"High Cholesterol: Care Instructions. \" Current as of: May 10, 2017 Content Version: 11.7 © 5762-7948 [employer]. Care instructions adapted under license by [employer] (which disclaims liability or warranty for this information). If you have questions about a medical condition or this instruction, always ask your healthcare professional. [address] any warranty or liability for your use of this information. Introducing [facility]! Dear Maria Teresa Poe: 
Thank you for requesting a QVPN account. Our records indicate that you already have an active QVPN account. You can access your account anytime at [email or web address] Did you know that you can access your hospital and ER discharge instructions at any time in QVPN? You can also review all of your test results from your hospital stay or ER visit. Additional Information If you have questions, please visit the Frequently Asked Questions section of the QVPN website at [email or web address] Remember, QVPN is NOT to be used for urgent needs. For medical emergencies, dial 911. Now available from your iPhone and Android! Please provide this summary of care documentation to your next provider. Your primary care clinician is listed as Tad Romeo. If you have any questions after today's visit, please call [phone].

## 2018-09-06 LAB
ALBUMIN SERPL-MCNC: 4.6 G/DL (ref 3.6–4.8)
ALBUMIN/GLOB SERPL: 1.9 {RATIO} (ref 1.2–2.2)
ALP SERPL-CCNC: 83 IU/L (ref 39–117)
ALT SERPL-CCNC: 16 IU/L (ref 0–32)
AST SERPL-CCNC: 21 IU/L (ref 0–40)
BILIRUB SERPL-MCNC: 0.5 MG/DL (ref 0–1.2)
BUN SERPL-MCNC: 16 MG/DL (ref 8–27)
BUN/CREAT SERPL: 19 (ref 12–28)
CALCIUM SERPL-MCNC: 9.9 MG/DL (ref 8.7–10.3)
CHLORIDE SERPL-SCNC: 101 MMOL/L (ref 96–106)
CHOLEST SERPL-MCNC: 179 MG/DL (ref 100–199)
CO2 SERPL-SCNC: 23 MMOL/L (ref 20–29)
CREAT SERPL-MCNC: 0.84 MG/DL (ref 0.57–1)
EST. AVERAGE GLUCOSE BLD GHB EST-MCNC: 114 MG/DL
GLOBULIN SER CALC-MCNC: 2.4 G/DL (ref 1.5–4.5)
GLUCOSE SERPL-MCNC: 86 MG/DL (ref 65–99)
HBA1C MFR BLD: 5.6 % (ref 4.8–5.6)
HDLC SERPL-MCNC: 61 MG/DL
INTERPRETATION, 910389: NORMAL
LDLC SERPL CALC-MCNC: 89 MG/DL (ref 0–99)
POTASSIUM SERPL-SCNC: 5.1 MMOL/L (ref 3.5–5.2)
PROLACTIN SERPL-MCNC: 9.8 NG/ML (ref 4.8–23.3)
PROT SERPL-MCNC: 7 G/DL (ref 6–8.5)
SODIUM SERPL-SCNC: 138 MMOL/L (ref 134–144)
TRIGL SERPL-MCNC: 144 MG/DL (ref 0–149)
VLDLC SERPL CALC-MCNC: 29 MG/DL (ref 5–40)

## 2018-09-17 ENCOUNTER — TELEPHONE (OUTPATIENT)
Dept: FAMILY MEDICINE CLINIC | Age: 68
End: 2018-09-17

## 2018-09-17 NOTE — PROGRESS NOTES
Inform pt to go to my chart to see results and recommendations RECOMMENDATIONS: 
None. Keep up the good work! Continue with current  diet and medications. Labs look great! Any questions let me know.

## 2018-09-17 NOTE — PROGRESS NOTES
[phone] attempted to call patient no answer left message to call me back in regards to her labs

## 2018-09-21 ENCOUNTER — TELEPHONE (OUTPATIENT)
Dept: FAMILY MEDICINE CLINIC | Age: 68
End: 2018-09-21

## 2018-09-21 NOTE — TELEPHONE ENCOUNTER
Patient called back to find out if she got her prevnar 13 per patient she didn't patient wants to get the prevnar 13 today advised to come back to office today patient understand

## 2018-09-21 NOTE — TELEPHONE ENCOUNTER
----- Message from Mathew Goodpasture sent at 9/20/2018  4:50 PM EDT -----  Regarding: Dr. Lisa Briceno  Contact: [phone]  Pt returning a missed call to the practice. No additional info. disclosed.

## 2018-09-21 NOTE — TELEPHONE ENCOUNTER
[phone] attempted to call patient no answer left message to call me back.     Notes: I want to find out if patient got her pneumonia 13 when she was in the 9/5/2018

## 2018-10-14 DIAGNOSIS — M17.11 PRIMARY OSTEOARTHRITIS OF RIGHT KNEE: ICD-10-CM

## 2018-10-15 RX ORDER — MELOXICAM 15 MG/1
TABLET ORAL
Qty: 90 TAB | Refills: 1 | Status: SHIPPED | OUTPATIENT
Start: 2018-10-15 | End: 2019-03-06

## 2018-11-27 RX ORDER — ATORVASTATIN CALCIUM 20 MG/1
TABLET, FILM COATED ORAL
Qty: 90 TAB | Refills: 1 | Status: SHIPPED | OUTPATIENT
Start: 2018-11-27 | End: 2019-03-06

## 2019-03-06 ENCOUNTER — OFFICE VISIT (OUTPATIENT)
Dept: FAMILY MEDICINE CLINIC | Age: 69
End: 2019-03-06

## 2019-03-06 VITALS
DIASTOLIC BLOOD PRESSURE: 80 MMHG | OXYGEN SATURATION: 96 % | WEIGHT: 187.2 LBS | HEART RATE: 81 BPM | RESPIRATION RATE: 18 BRPM | HEIGHT: 62 IN | BODY MASS INDEX: 34.45 KG/M2 | TEMPERATURE: 97.7 F | SYSTOLIC BLOOD PRESSURE: 136 MMHG

## 2019-03-06 DIAGNOSIS — R73.09 ELEVATED GLUCOSE: Primary | ICD-10-CM

## 2019-03-06 DIAGNOSIS — Z23 ENCOUNTER FOR IMMUNIZATION: ICD-10-CM

## 2019-03-06 DIAGNOSIS — E78.2 MIXED HYPERLIPIDEMIA: ICD-10-CM

## 2019-03-06 DIAGNOSIS — F33.8 SEASONAL AFFECTIVE DISORDER (HCC): ICD-10-CM

## 2019-03-06 DIAGNOSIS — M17.11 PRIMARY OSTEOARTHRITIS OF RIGHT KNEE: ICD-10-CM

## 2019-03-06 LAB — HBA1C MFR BLD HPLC: 5.5 %

## 2019-03-06 RX ORDER — MELOXICAM 15 MG/1
TABLET ORAL
Qty: 90 TAB | Refills: 1 | Status: SHIPPED | OUTPATIENT
Start: 2019-03-06 | End: 2019-09-04 | Stop reason: SDUPTHER

## 2019-03-06 RX ORDER — ATORVASTATIN CALCIUM 20 MG/1
TABLET, FILM COATED ORAL
Qty: 90 TAB | Refills: 1 | Status: SHIPPED | OUTPATIENT
Start: 2019-03-06 | End: 2019-09-04 | Stop reason: SDUPTHER

## 2019-03-06 NOTE — PATIENT INSTRUCTIONS
Body Mass Index: Care Instructions  Your Care Instructions    Body mass index (BMI) can help you see if your weight is raising your risk for health problems. It uses a formula to compare how much you weigh with how tall you are. · A BMI lower than 18.5 is considered underweight. · A BMI between 18.5 and 24.9 is considered healthy. · A BMI between 25 and 29.9 is considered overweight. A BMI of 30 or higher is considered obese. If your BMI is in the normal range, it means that you have a lower risk for weight-related health problems. If your BMI is in the overweight or obese range, you may be at increased risk for weight-related health problems, such as high blood pressure, heart disease, stroke, arthritis or joint pain, and diabetes. If your BMI is in the underweight range, you may be at increased risk for health problems such as fatigue, lower protection (immunity) against illness, muscle loss, bone loss, hair loss, and hormone problems. BMI is just one measure of your risk for weight-related health problems. You may be at higher risk for health problems if you are not active, you eat an unhealthy diet, or you drink too much alcohol or use tobacco products. Follow-up care is a key part of your treatment and safety. Be sure to make and go to all appointments, and call your doctor if you are having problems. It's also a good idea to know your test results and keep a list of the medicines you take. How can you care for yourself at home? · Practice healthy eating habits. This includes eating plenty of fruits, vegetables, whole grains, lean protein, and low-fat dairy. · If your doctor recommends it, get more exercise. Walking is a good choice. Bit by bit, increase the amount you walk every day. Try for at least 30 minutes on most days of the week. · Do not smoke. Smoking can increase your risk for health problems. If you need help quitting, talk to your doctor about stop-smoking programs and medicines. These can increase your chances of quitting for good. · Limit alcohol to 2 drinks a day for men and 1 drink a day for women. Too much alcohol can cause health problems. If you have a BMI higher than 25  · Your doctor may do other tests to check your risk for weight-related health problems. This may include measuring the distance around your waist. A waist measurement of more than 40 inches in men or 35 inches in women can increase the risk of weight-related health problems. · Talk with your doctor about steps you can take to stay healthy or improve your health. You may need to make lifestyle changes to lose weight and stay healthy, such as changing your diet and getting regular exercise. If you have a BMI lower than 18.5  · Your doctor may do other tests to check your risk for health problems. · Talk with your doctor about steps you can take to stay healthy or improve your health. You may need to make lifestyle changes to gain or maintain weight and stay healthy, such as getting more healthy foods in your diet and doing exercises to build muscle. Where can you learn more? Go to http://lorna-darryl.info/. Enter S176 in the search box to learn more about \"Body Mass Index: Care Instructions. \"  Current as of: June 25, 2018  Content Version: 11.9  © 2907-5575 [employer]. Care instructions adapted under license by [employer] (which disclaims liability or warranty for this information). If you have questions about a medical condition or this instruction, always ask your healthcare professional. [address] any warranty or liability for your use of this information.

## 2019-03-06 NOTE — PROGRESS NOTES
HPI  Leah Blood 79 y.o. female  presents to the office today for follow up on obesity and cholesterol. Blood pressure 136/80, pulse 81, temperature 97.7 °F (36.5 °C), temperature source Oral, resp. rate 18, height 5' 2\" (1.575 m), weight 187 lb 3.2 oz (84.9 kg), SpO2 96 %. Body mass index is 34.24 kg/m². Chief Complaint   Patient presents with    Cholesterol Problem     follow up    Obesity     follow up      Elevated BP: BP at office today 136/90 and 136/80 on manual recheck. Pt is not on medications at this time. Pt insists BP is much lower at home and outside of office. BP slightly elevated today in office. Advised pt to monitor BP at home and report elevated findings to me. We will not add in medications at this time. Hyperlipidemia: Lipid panel on 9/5/18 notable for total cholesterol 179, LDL 89, HDL 61, and triglycerides 144. Pt continues with atorvastatin 20 mg/d. Presumed stable, will assess levels today. Obesity: I have reviewed/discussed the above normal BMI with the patient. I have recommended the following interventions: dietary management education, guidance, and counseling, encourage exercise and monitor weight. Osteoarthritis R Knee: Pt reports Meloxicam 15 mg/d has been helping to manage knee pains. Advised pt to continue with Meloxicam and to stretch/exercise as tolerated. Elevated Glucose: A1c per POC 5.5 today in office, decreased from previous value of 5.6. Pt will continue to monitor diet and exercise. Health Maintenance: Pt received influenza vaccine 10/2018. Pt due for Shingrix vaccine. Provided pt with prescription and will follow up with local pharmacy for Shingrix vaccine. Current Outpatient Medications   Medication Sig Dispense Refill    varicella-zoster recombinant, PF, (SHINGRIX, PF,) 50 mcg/0.5 mL susr injection 0.5 mL by IntraMUSCular route once for 1 dose.  0.5 mL 1    atorvastatin (LIPITOR) 20 mg tablet TAKE 1 TABLET DAILY 90 Tab 1    meloxicam (MOBIC) 15 mg tablet TAKE 1 TABLET DAILY FOR OSTEOARTHRITIS 90 Tab 1    magnesium oxide (MAG-OX) 400 mg tablet Take 400 mg by mouth daily.  omega-3 fatty acids-vitamin e 1,000 mg cap Take 2 Caps by mouth.  escitalopram oxalate (LEXAPRO) 20 mg tablet Take 1 Tab by mouth daily. Indications: MAJOR DEPRESSIVE DISORDER 30 Tab 0    busPIRone (BUSPAR) 10 mg tablet Take 1 Tab by mouth two (2) times a day. Indications: GENERALIZED ANXIETY DISORDER 60 Tab 1    albuterol (PROVENTIL HFA, VENTOLIN HFA, PROAIR HFA) 90 mcg/actuation inhaler Take 2 puffs by inhalation every four (4) hours as needed for Wheezing. 1 Inhaler 0    CALCIUM CARBONATE/VITAMIN D3 (CALCIUM + D PO) Take 1 Tab by mouth daily. Indications: SUPPLEMENT      FEXOFENADINE/PSEUDOEPHEDRINE (ALLEGRA-D 12 HOUR PO) Take 1 Tab by mouth daily as needed (Allergies). Indications: SEASONAL ALLERGIES      MULTIVITAMINS (MULTIPLE VITAMIN PO) Take 1 Tab by mouth daily. Indications: VITAMIN DEFICIENCY PREVENTION       No Known Allergies  Past Medical History:   Diagnosis Date    Anxiety     AR (allergic rhinitis)     Depression     Spondylosis      Past Surgical History:   Procedure Laterality Date    HX HYSTERECTOMY       Family History   Problem Relation Age of Onset    Cancer Mother 80        lyphoma    Lung Disease Father     Heart Disease Father 80        a fib    Other Brother 40        Amlydosis     Social History     Tobacco Use    Smoking status: Former Smoker     Years: 30.00     Last attempt to quit: 9/9/2003     Years since quitting: 15.4    Smokeless tobacco: Never Used   Substance Use Topics    Alcohol use: Yes     Alcohol/week: 0.5 oz     Types: 1 Glasses of wine per week     Comment: ocassional        Review of Systems   Constitutional: Negative for chills and fever. HENT: Negative for hearing loss and tinnitus. Eyes: Negative for blurred vision and double vision. Respiratory: Negative for shortness of breath. Cardiovascular: Negative for chest pain and palpitations. Gastrointestinal: Negative for nausea and vomiting. Genitourinary: Negative for dysuria and frequency. Musculoskeletal: Negative for back pain and falls. Skin: Negative for itching and rash. Neurological: Negative for dizziness, loss of consciousness and headaches. Psychiatric/Behavioral: Negative for depression. The patient is not nervous/anxious. Physical Exam   Constitutional: She is oriented to person, place, and time. She appears well-developed and well-nourished. HENT:   Head: Normocephalic and atraumatic. Right Ear: External ear normal.   Left Ear: External ear normal.   Nose: Nose normal.   Mouth/Throat: Oropharynx is clear and moist.   Eyes: Conjunctivae and EOM are normal.   Neck: Normal range of motion. Neck supple. Carotid bruit is not present. No thyroid mass and no thyromegaly present. Cardiovascular: Normal rate, regular rhythm, S1 normal, S2 normal, normal heart sounds and intact distal pulses. Pulmonary/Chest: Effort normal and breath sounds normal.   Abdominal: Soft. Normal appearance and bowel sounds are normal. There is no hepatosplenomegaly. There is no tenderness. Musculoskeletal: Normal range of motion. Neurological: She is alert and oriented to person, place, and time. She has normal strength. No cranial nerve deficit or sensory deficit. Coordination normal.   Skin: Skin is warm, dry and intact. No abrasion and no rash noted. Psychiatric: She has a normal mood and affect. Her behavior is normal. Judgment and thought content normal.   Nursing note and vitals reviewed. ASSESSMENT and PLAN  Diagnoses and all orders for this visit:    1. Elevated glucose  A1c per POC 5.5 today in office, decreased from previous value of 5.6. Pt will continue to monitor diet and exercise. -     AMB POC HEMOGLOBIN B6Q  -     METABOLIC PANEL, COMPREHENSIVE    2.  Mixed hyperlipidemia  Presumed stable, will assess levels today. Pt will continue on atorvastatin 20 mg/d.   -     atorvastatin (LIPITOR) 20 mg tablet; TAKE 1 TABLET DAILY  -     METABOLIC PANEL, COMPREHENSIVE  -     LIPID PANEL    3. Seasonal affective disorder (HCC)  Assessment & Plan:  Stable, based on history, physical exam and review of pertinent labs, studies and medications; meds reconciled; continue current treatment plan. Key Psychotherapeutic Meds             escitalopram oxalate (LEXAPRO) 20 mg tablet  (Taking) Take 1 Tab by mouth daily. Indications: MAJOR DEPRESSIVE DISORDER    busPIRone (BUSPAR) 10 mg tablet  (Taking) Take 1 Tab by mouth two (2) times a day. Indications: GENERALIZED ANXIETY DISORDER    LATUDA 20 mg tab tablet take 1/4 tab ( 5mg ) q day        Other [address] [place] [address]     The patient is on no other behavioral health meds. Lab Results   Component Value Date/Time    Sodium 138 09/05/2018 02:36 PM    Creatinine 0.84 09/05/2018 02:36 PM    ALT (SGPT) 16 09/05/2018 02:36 PM    AST (SGOT) 21 09/05/2018 02:36 PM       Orders:  -     CBC WITH AUTOMATED DIFF    4. Primary osteoarthritis of right knee  Pt will continue to manage with meloxicam, refilled medication today in office. Advised pt to stretch/exercise as tolerated. -     meloxicam (MOBIC) 15 mg tablet; TAKE 1 TABLET DAILY FOR OSTEOARTHRITIS    5. Encounter for immunization  Provided pt with prescription and will follow up with local pharmacy for Shingrix vaccine. -     varicella-zoster recombinant, PF, (SHINGRIX, PF,) 50 mcg/0.5 mL susr injection; 0.5 mL by IntraMUSCular route once for 1 dose. Follow-up Disposition:  Return in about 6 months (around 9/6/2019) for hyperlipidemia follow up, hypertension follow up. Medication risks/benefits/costs/interactions/alternatives discussed with patient.   Advised patient to call back or return to office if symptoms worsen/change/persist.  If patient cannot reach us or should anything more severe/urgent arise he/she should proceed directly to the nearest emergency department. Discussed expected course/resolution/complications of diagnosis in detail with patient. Patient given a written after visit summary which includes her diagnoses, current medications and vitals. Patient expressed understanding with the diagnosis and plan. Written by khari Weber, as dictated by Lisa Weldon M.D.    2:26 PM - 2:42 PM    Total time spent with the patient 16 minutes, greater than 50% of time spent counseling patient.

## 2019-03-06 NOTE — PROGRESS NOTES
Chief Complaint   Patient presents with    Cholesterol Problem     follow up    Obesity     follow up       1. Have you been to the ER, urgent care clinic since your last visit? Hospitalized since your last visit? No    2. Have you seen or consulted any other health care providers outside of the [address] [place] [address] since your last visit? Include any pap smears or colon screening.  Patient had surgery on both hands at [address] [place] [address] with Dr. Sandra Daley in November

## 2019-03-06 NOTE — ASSESSMENT & PLAN NOTE
Stable, based on history, physical exam and review of pertinent labs, studies and medications; meds reconciled; continue current treatment plan. Key Psychotherapeutic Meds             escitalopram oxalate (LEXAPRO) 20 mg tablet  (Taking) Take 1 Tab by mouth daily. Indications: MAJOR DEPRESSIVE DISORDER    busPIRone (BUSPAR) 10 mg tablet  (Taking) Take 1 Tab by mouth two (2) times a day. Indications: GENERALIZED ANXIETY DISORDER    LATUDA 20 mg tab tablet take 1/4 tab ( 5mg ) q day        Other [address] [place] [address]     The patient is on no other behavioral health meds.         Lab Results   Component Value Date/Time    Sodium 138 09/05/2018 02:36 PM    Creatinine 0.84 09/05/2018 02:36 PM    ALT (SGPT) 16 09/05/2018 02:36 PM    AST (SGOT) 21 09/05/2018 02:36 PM

## 2019-03-08 LAB
ALBUMIN SERPL-MCNC: 4.6 G/DL (ref 3.6–4.8)
ALBUMIN/GLOB SERPL: 2 {RATIO} (ref 1.2–2.2)
ALP SERPL-CCNC: 88 IU/L (ref 39–117)
ALT SERPL-CCNC: 30 IU/L (ref 0–32)
AST SERPL-CCNC: 27 IU/L (ref 0–40)
BASOPHILS # BLD AUTO: 0 X10E3/UL (ref 0–0.2)
BASOPHILS NFR BLD AUTO: 0 %
BILIRUB SERPL-MCNC: 0.5 MG/DL (ref 0–1.2)
BUN SERPL-MCNC: 20 MG/DL (ref 8–27)
BUN/CREAT SERPL: 22 (ref 12–28)
CALCIUM SERPL-MCNC: 9.9 MG/DL (ref 8.7–10.3)
CHLORIDE SERPL-SCNC: 105 MMOL/L (ref 96–106)
CHOLEST SERPL-MCNC: 194 MG/DL (ref 100–199)
CO2 SERPL-SCNC: 23 MMOL/L (ref 20–29)
CREAT SERPL-MCNC: 0.93 MG/DL (ref 0.57–1)
EOSINOPHIL # BLD AUTO: 0.3 X10E3/UL (ref 0–0.4)
EOSINOPHIL NFR BLD AUTO: 3 %
ERYTHROCYTE [DISTWIDTH] IN BLOOD BY AUTOMATED COUNT: 13.5 % (ref 12.3–15.4)
GLOBULIN SER CALC-MCNC: 2.3 G/DL (ref 1.5–4.5)
GLUCOSE SERPL-MCNC: 93 MG/DL (ref 65–99)
HCT VFR BLD AUTO: 40 % (ref 34–46.6)
HDLC SERPL-MCNC: 77 MG/DL
HGB BLD-MCNC: 13.1 G/DL (ref 11.1–15.9)
IMM GRANULOCYTES # BLD AUTO: 0 X10E3/UL (ref 0–0.1)
IMM GRANULOCYTES NFR BLD AUTO: 1 %
INTERPRETATION, 910389: NORMAL
LDLC SERPL CALC-MCNC: 94 MG/DL (ref 0–99)
LYMPHOCYTES # BLD AUTO: 2.1 X10E3/UL (ref 0.7–3.1)
LYMPHOCYTES NFR BLD AUTO: 25 %
MCH RBC QN AUTO: 28.5 PG (ref 26.6–33)
MCHC RBC AUTO-ENTMCNC: 32.8 G/DL (ref 31.5–35.7)
MCV RBC AUTO: 87 FL (ref 79–97)
MONOCYTES # BLD AUTO: 0.8 X10E3/UL (ref 0.1–0.9)
MONOCYTES NFR BLD AUTO: 9 %
NEUTROPHILS # BLD AUTO: 5.2 X10E3/UL (ref 1.4–7)
NEUTROPHILS NFR BLD AUTO: 62 %
PLATELET # BLD AUTO: 269 X10E3/UL (ref 150–379)
POTASSIUM SERPL-SCNC: 5 MMOL/L (ref 3.5–5.2)
PROT SERPL-MCNC: 6.9 G/DL (ref 6–8.5)
RBC # BLD AUTO: 4.59 X10E6/UL (ref 3.77–5.28)
SODIUM SERPL-SCNC: 142 MMOL/L (ref 134–144)
TRIGL SERPL-MCNC: 117 MG/DL (ref 0–149)
VLDLC SERPL CALC-MCNC: 23 MG/DL (ref 5–40)
WBC # BLD AUTO: 8.3 X10E3/UL (ref 3.4–10.8)

## 2019-03-16 NOTE — PROGRESS NOTES
[id number] Spoke to patient Identified pt with two pt identifiers (Name @ )  Patient notified of her labs results and understand

## 2019-03-16 NOTE — PROGRESS NOTES
Inform pt to go to my chart to see results and recommendations    Labs look good  Keep up the good work    Any questions let me know

## 2019-09-04 ENCOUNTER — OFFICE VISIT (OUTPATIENT)
Dept: FAMILY MEDICINE CLINIC | Age: 69
End: 2019-09-04

## 2019-09-04 VITALS
DIASTOLIC BLOOD PRESSURE: 56 MMHG | HEART RATE: 90 BPM | BODY MASS INDEX: 35.37 KG/M2 | OXYGEN SATURATION: 97 % | WEIGHT: 192.2 LBS | RESPIRATION RATE: 17 BRPM | SYSTOLIC BLOOD PRESSURE: 132 MMHG | TEMPERATURE: 98 F | HEIGHT: 62 IN

## 2019-09-04 DIAGNOSIS — Z23 ENCOUNTER FOR IMMUNIZATION: ICD-10-CM

## 2019-09-04 DIAGNOSIS — E66.01 SEVERE OBESITY (HCC): ICD-10-CM

## 2019-09-04 DIAGNOSIS — Z00.00 ENCOUNTER FOR MEDICARE ANNUAL WELLNESS EXAM: ICD-10-CM

## 2019-09-04 DIAGNOSIS — M17.11 PRIMARY OSTEOARTHRITIS OF RIGHT KNEE: ICD-10-CM

## 2019-09-04 DIAGNOSIS — R73.09 ELEVATED GLUCOSE: ICD-10-CM

## 2019-09-04 DIAGNOSIS — E78.2 MIXED HYPERLIPIDEMIA: Primary | ICD-10-CM

## 2019-09-04 RX ORDER — MELOXICAM 15 MG/1
TABLET ORAL
Qty: 90 TAB | Refills: 1 | Status: SHIPPED | OUTPATIENT
Start: 2019-09-04 | End: 2020-03-31 | Stop reason: SDUPTHER

## 2019-09-04 RX ORDER — ATORVASTATIN CALCIUM 20 MG/1
TABLET, FILM COATED ORAL
Qty: 90 TAB | Refills: 1 | Status: SHIPPED | OUTPATIENT
Start: 2019-09-04 | End: 2020-08-23

## 2019-09-04 NOTE — PROGRESS NOTES
This is the Subsequent Medicare Annual Wellness Exam, performed 12 months or more after the Initial AWV or the last Subsequent AWV    I have reviewed the patient's medical history in detail and updated the computerized patient record. History     Past Medical History:   Diagnosis Date    Anxiety     AR (allergic rhinitis)     Depression     Spondylosis       Past Surgical History:   Procedure Laterality Date    HX HYSTERECTOMY       Current Outpatient Medications   Medication Sig Dispense Refill    varicella-zoster recombinant, PF, (SHINGRIX, PF,) 50 mcg/0.5 mL susr injection 0.5 mL by IntraMUSCular route once for 1 dose. 0.5 mL 1    meloxicam (MOBIC) 15 mg tablet TAKE 1 TABLET DAILY FOR OSTEOARTHRITIS 90 Tab 1    atorvastatin (LIPITOR) 20 mg tablet TAKE 1 TABLET DAILY 90 Tab 1    magnesium oxide (MAG-OX) 400 mg tablet Take 400 mg by mouth daily.  omega-3 fatty acids-vitamin e 1,000 mg cap Take 2 Caps by mouth.  escitalopram oxalate (LEXAPRO) 20 mg tablet Take 1 Tab by mouth daily. Indications: MAJOR DEPRESSIVE DISORDER 30 Tab 0    busPIRone (BUSPAR) 10 mg tablet Take 1 Tab by mouth two (2) times a day. Indications: GENERALIZED ANXIETY DISORDER 60 Tab 1    albuterol (PROVENTIL HFA, VENTOLIN HFA, PROAIR HFA) 90 mcg/actuation inhaler Take 2 puffs by inhalation every four (4) hours as needed for Wheezing. 1 Inhaler 0    CALCIUM CARBONATE/VITAMIN D3 (CALCIUM + D PO) Take 1 Tab by mouth daily. Indications: SUPPLEMENT      FEXOFENADINE/PSEUDOEPHEDRINE (ALLEGRA-D 12 HOUR PO) Take 1 Tab by mouth daily as needed (Allergies). Indications: SEASONAL ALLERGIES      MULTIVITAMINS (MULTIPLE VITAMIN PO) Take 1 Tab by mouth daily.  Indications: VITAMIN DEFICIENCY PREVENTION       No Known Allergies  Family History   Problem Relation Age of Onset    Cancer Mother 80        lyphoma    Lung Disease Father     Heart Disease Father 80        a fib    Other Brother 40        Amlydosis     Social History Tobacco Use    Smoking status: Former Smoker     Years: 30.00     Last attempt to quit: 9/9/2003     Years since quitting: 15.9    Smokeless tobacco: Never Used   Substance Use Topics    Alcohol use: Yes     Alcohol/week: 0.8 standard drinks     Types: 1 Glasses of wine per week     Comment: ocassional     Patient Active Problem List   Diagnosis Code    Spondylosis M47.9    Anxiety F41.9    AR (allergic rhinitis) J30.9    Sleep disturbance D11.1    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, 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    Depression F32.9    Acute meniscal tear of knee S83.209A    Obesity, Class I, BMI 30-34.9 E66.9    Seasonal affective disorder (HCC) F33.8    Primary osteoarthritis of right knee M17.11    Mixed hyperlipidemia E78.2    Severe obesity (HCC) E66.01       Depression Risk Factor Screening:     3 most recent PHQ Screens 9/4/2019   Little interest or pleasure in doing things Not at all   Feeling down, depressed, irritable, or hopeless Not at all   Total Score PHQ 2 0   Trouble falling or staying asleep, or sleeping too much -   Feeling tired or having little energy -   Poor appetite, weight loss, or overeating -   Feeling bad about yourself - or that you are a failure or have let yourself or your family down -   Trouble concentrating on things such as school, work, reading, or watching TV -   Moving or speaking so slowly that other people could have noticed; or the opposite being so fidgety that others notice -   Thoughts of being better off dead, or hurting yourself in some way -   PHQ 9 Score -   How difficult have these problems made it for you to do your work, take care of your home and get along with others -     Alcohol Risk Factor Screening: You do not drink alcohol or very rarely. Functional Ability and Level of Safety:   Hearing Loss  Hearing is good. Activities of Daily Living  The home contains: no safety equipment.   Patient does total self care    Fall Risk  Fall Risk Assessment, last 12 mths 9/4/2019   Able to walk? Yes   Fall in past 12 months? No       Abuse Screen  Patient is not abused    Cognitive Screening   Evaluation of Cognitive Function:  Has your family/caregiver stated any concerns about your memory: no      Patient Care Team   Patient Care Team:  Amberly Aviles MD as PCP - General    Assessment/Plan   Education and counseling provided:  Are appropriate based on today's review and evaluation    Diagnoses and all orders for this visit:    1. Mixed hyperlipidemia  -     METABOLIC PANEL, COMPREHENSIVE  -     LIPID PANEL  -     atorvastatin (LIPITOR) 20 mg tablet; TAKE 1 TABLET DAILY    2. Elevated glucose  -     HEMOGLOBIN A1C WITH EAG    3. Encounter for immunization  -     INFLUENZA VACCINE INACTIVATED (IIV), SUBUNIT, ADJUVANTED, IM  -     UT IMMUNIZ ADMIN,1 SINGLE/COMB VAC/TOXOID  -     varicella-zoster recombinant, PF, (SHINGRIX, PF,) 50 mcg/0.5 mL susr injection; 0.5 mL by IntraMUSCular route once for 1 dose. 4. Severe obesity ([address]    5.  Primary osteoarthritis of right knee  -     meloxicam (MOBIC) 15 mg tablet; TAKE 1 TABLET DAILY FOR OSTEOARTHRITIS        Health Maintenance Due   Topic Date Due    Shingrix Vaccine Age 49> (1 of 2) 12/31/2002    GLAUCOMA SCREENING Q2Y  08/30/2019    MEDICARE YEARLY EXAM  09/04/2019    BREAST CANCER SCRN MAMMOGRAM  10/15/2019

## 2019-09-04 NOTE — PROGRESS NOTES
History of Present Illness  Omari Cota is a 79 y.o. female who presents to the office today for follow up of routine medical issues. Hyperlipidemia: Lipid panel on 03/07/2019 notable for total cholesterol 194, HDL 77, LDL 94, and triglycerides 117. Pt continues with atorvastatin 20mg. Obesity: Pt reports that she has started doing yard work since retiring and is enjoying this, but is otherwise not doing any exercise. Her weight has increased from 177lbs in 09/2018 to 192lbs today. Routine health maintenance: Last mammogram was in 10/2018 at [facility]. She is due for Shingrix. Last eye exam was in 01/2019. Pt notes that she now has Medicare, but her [facility] policy has not been cancelled d/t miscommunication between insurance companies. She is trying to get this reconciled now. Her last Hgb A1c, CBC, CMP were WNL in 03/2019. She will get repeat labs as soon as she gets her insurance straight. /56 (BP 1 Location: Left arm, BP Patient Position: Sitting)   Pulse 90   Temp 98 °F (36.7 °C) (Oral)   Resp 17   Ht 5' 2\" (1.575 m)   Wt 192 lb 3.2 oz (87.2 kg)   SpO2 97%   BMI 35.15 kg/m²     Current Outpatient Medications   Medication Sig Dispense Refill    varicella-zoster recombinant, PF, (SHINGRIX, PF,) 50 mcg/0.5 mL susr injection 0.5 mL by IntraMUSCular route once for 1 dose. 0.5 mL 1    meloxicam (MOBIC) 15 mg tablet TAKE 1 TABLET DAILY FOR OSTEOARTHRITIS 90 Tab 1    atorvastatin (LIPITOR) 20 mg tablet TAKE 1 TABLET DAILY 90 Tab 1    magnesium oxide (MAG-OX) 400 mg tablet Take 400 mg by mouth daily.  omega-3 fatty acids-vitamin e 1,000 mg cap Take 2 Caps by mouth.  escitalopram oxalate (LEXAPRO) 20 mg tablet Take 1 Tab by mouth daily. Indications: MAJOR DEPRESSIVE DISORDER 30 Tab 0    busPIRone (BUSPAR) 10 mg tablet Take 1 Tab by mouth two (2) times a day.  Indications: GENERALIZED ANXIETY DISORDER 60 Tab 1    albuterol (PROVENTIL HFA, VENTOLIN HFA, PROAIR HFA) 90 mcg/actuation inhaler Take 2 puffs by inhalation every four (4) hours as needed for Wheezing. 1 Inhaler 0    CALCIUM CARBONATE/VITAMIN D3 (CALCIUM + D PO) Take 1 Tab by mouth daily. Indications: SUPPLEMENT      FEXOFENADINE/PSEUDOEPHEDRINE (ALLEGRA-D 12 HOUR PO) Take 1 Tab by mouth daily as needed (Allergies). Indications: SEASONAL ALLERGIES      MULTIVITAMINS (MULTIPLE VITAMIN PO) Take 1 Tab by mouth daily. Indications: VITAMIN DEFICIENCY PREVENTION       No Known Allergies  Past Medical History:   Diagnosis Date    Anxiety     AR (allergic rhinitis)     Depression     Spondylosis      Past Surgical History:   Procedure Laterality Date    HX HYSTERECTOMY       Family History   Problem Relation Age of Onset    Cancer Mother 80        lyphoma    Lung Disease Father     Heart Disease Father 80        a fib    Other Brother 40        Amlydosis     Social History     Tobacco Use    Smoking status: Former Smoker     Years: 30.00     Last attempt to quit: 9/9/2003     Years since quitting: 15.9    Smokeless tobacco: Never Used   Substance Use Topics    Alcohol use: Yes     Alcohol/week: 0.8 standard drinks     Types: 1 Glasses of wine per week     Comment: ocassional        Review of Systems   Constitutional: Negative for chills and fever. HENT: Negative for hearing loss and tinnitus. Eyes: Negative for blurred vision and double vision. Respiratory: Negative for shortness of breath. Cardiovascular: Negative for chest pain and palpitations. Gastrointestinal: Negative for nausea and vomiting. Genitourinary: Negative for dysuria and frequency. Musculoskeletal: Negative for back pain and falls. Skin: Negative for itching and rash. Neurological: Negative for dizziness and headaches. Psychiatric/Behavioral: Negative for depression. The patient is not nervous/anxious. Physical Exam   Constitutional: She is oriented to person, place, and time and well-developed, well-nourished, and in no distress. HENT:   Head: Normocephalic and atraumatic. Right Ear: External ear normal.   Left Ear: External ear normal.   Nose: Nose normal.   Mouth/Throat: Oropharynx is clear and moist.   Eyes: Conjunctivae and EOM are normal.   Neck: Normal range of motion. Neck supple. Carotid bruit is not present. Cardiovascular: Normal rate, regular rhythm, normal heart sounds and intact distal pulses. Pulmonary/Chest: Effort normal and breath sounds normal.   Abdominal: Soft. Bowel sounds are normal.   Musculoskeletal: Normal range of motion. Neurological: She is alert and oriented to person, place, and time. Skin: Skin is warm and dry. Psychiatric: Mood, affect and judgment normal.   Nursing note and vitals reviewed. Diagnoses and all orders for this visit:    1. Mixed hyperlipidemia  -     METABOLIC PANEL, COMPREHENSIVE  -     LIPID PANEL  -     atorvastatin (LIPITOR) 20 mg tablet; TAKE 1 TABLET DAILY    2. Elevated glucose  -     HEMOGLOBIN A1C WITH EAG    3. Encounter for immunization  -     INFLUENZA VACCINE INACTIVATED (IIV), SUBUNIT, ADJUVANTED, IM  -     MT IMMUNIZ ADMIN,1 SINGLE/COMB VAC/TOXOID  -     varicella-zoster recombinant, PF, (SHINGRIX, PF,) 50 mcg/0.5 mL susr injection; 0.5 mL by IntraMUSCular route once for 1 dose. 4. Severe obesity ([address]    5. Primary osteoarthritis of right knee  -     meloxicam (MOBIC) 15 mg tablet; TAKE 1 TABLET DAILY FOR OSTEOARTHRITIS    6. Encounter for Medicare annual wellness exam    1. HLD  Recheck CMP, lipid panel. Continue atorvastatin. 2. Elevated glucose  Recheck Hgb A1c.    3. Encounter for immunization  Pt received influenza vaccine today in the office. Prescribed Shingrix. 4. Obesity  I have reviewed/discussed the above normal BMI with the patient. I have recommended the following interventions: dietary management education, guidance, and counseling, encourage exercise and monitor weight .      5. Primary OA of left knee  Continue meloxicam.    6. Medicare Annual Wellness Visit  See attached note. Medication risks/benefits/costs/interactions/alternatives discussed with patient. Advised patient to call back or return to office if symptoms worsen/change/persist.  If patient cannot reach us or should anything more severe/urgent arise she should proceed directly to the nearest emergency department. Discussed expected course/resolution/complications of diagnosis in detail with patient. Patient given a written after visit summary which includes her diagnoses, current medications and vitals. Patient expressed understanding with the diagnosis and plan. Written by Wendy Rodriguez, as dictated by Ingris Rich M.D.     2:17 PM - 2:32 PM     Total time spent with the patient was 15 minutes, greater than 50% of time spent counseling patient.

## 2019-09-04 NOTE — PROGRESS NOTES
Chief Complaint   Patient presents with    Cholesterol Problem    Arthritis     follow up     1. Have you been to the ER, urgent care clinic since your last visit? Hospitalized since your last visit? No    2. Have you seen or consulted any other health care providers outside of the [address] [place] [address] since your last visit? Include any pap smears or colon screening.  No

## 2019-09-04 NOTE — PATIENT INSTRUCTIONS
Body Mass Index: Care Instructions  Your Care Instructions    Body mass index (BMI) can help you see if your weight is raising your risk for health problems. It uses a formula to compare how much you weigh with how tall you are. · A BMI lower than 18.5 is considered underweight. · A BMI between 18.5 and 24.9 is considered healthy. · A BMI between 25 and 29.9 is considered overweight. A BMI of 30 or higher is considered obese. If your BMI is in the normal range, it means that you have a lower risk for weight-related health problems. If your BMI is in the overweight or obese range, you may be at increased risk for weight-related health problems, such as high blood pressure, heart disease, stroke, arthritis or joint pain, and diabetes. If your BMI is in the underweight range, you may be at increased risk for health problems such as fatigue, lower protection (immunity) against illness, muscle loss, bone loss, hair loss, and hormone problems. BMI is just one measure of your risk for weight-related health problems. You may be at higher risk for health problems if you are not active, you eat an unhealthy diet, or you drink too much alcohol or use tobacco products. Follow-up care is a key part of your treatment and safety. Be sure to make and go to all appointments, and call your doctor if you are having problems. It's also a good idea to know your test results and keep a list of the medicines you take. How can you care for yourself at home? · Practice healthy eating habits. This includes eating plenty of fruits, vegetables, whole grains, lean protein, and low-fat dairy. · If your doctor recommends it, get more exercise. Walking is a good choice. Bit by bit, increase the amount you walk every day. Try for at least 30 minutes on most days of the week. · Do not smoke. Smoking can increase your risk for health problems. If you need help quitting, talk to your doctor about stop-smoking programs and medicines. These can increase your chances of quitting for good. · Limit alcohol to 2 drinks a day for men and 1 drink a day for women. Too much alcohol can cause health problems. If you have a BMI higher than 25  · Your doctor may do other tests to check your risk for weight-related health problems. This may include measuring the distance around your waist. A waist measurement of more than 40 inches in men or 35 inches in women can increase the risk of weight-related health problems. · Talk with your doctor about steps you can take to stay healthy or improve your health. You may need to make lifestyle changes to lose weight and stay healthy, such as changing your diet and getting regular exercise. If you have a BMI lower than 18.5  · Your doctor may do other tests to check your risk for health problems. · Talk with your doctor about steps you can take to stay healthy or improve your health. You may need to make lifestyle changes to gain or maintain weight and stay healthy, such as getting more healthy foods in your diet and doing exercises to build muscle. Where can you learn more? Go to http://lorna-darryl.info/. Enter S176 in the search box to learn more about \"Body Mass Index: Care Instructions. \"  Current as of: March 28, 2019  Content Version: 12.1  © 2761-4339 [employer], Incorporated. Care instructions adapted under license by [employer] (which disclaims liability or warranty for this information). If you have questions about a medical condition or this instruction, always ask your healthcare professional. [address] any warranty or liability for your use of this information.

## 2019-09-07 LAB
ALBUMIN SERPL-MCNC: 4.3 G/DL (ref 3.6–4.8)
ALBUMIN/GLOB SERPL: 1.7 {RATIO} (ref 1.2–2.2)
ALP SERPL-CCNC: 92 IU/L (ref 39–117)
ALT SERPL-CCNC: 19 IU/L (ref 0–32)
AST SERPL-CCNC: 21 IU/L (ref 0–40)
BILIRUB SERPL-MCNC: 0.6 MG/DL (ref 0–1.2)
BUN SERPL-MCNC: 19 MG/DL (ref 8–27)
BUN/CREAT SERPL: 19 (ref 12–28)
CALCIUM SERPL-MCNC: 9.6 MG/DL (ref 8.7–10.3)
CHLORIDE SERPL-SCNC: 104 MMOL/L (ref 96–106)
CHOLEST SERPL-MCNC: 162 MG/DL (ref 100–199)
CO2 SERPL-SCNC: 20 MMOL/L (ref 20–29)
CREAT SERPL-MCNC: 1 MG/DL (ref 0.57–1)
EST. AVERAGE GLUCOSE BLD GHB EST-MCNC: 117 MG/DL
GLOBULIN SER CALC-MCNC: 2.5 G/DL (ref 1.5–4.5)
GLUCOSE SERPL-MCNC: 83 MG/DL (ref 65–99)
HBA1C MFR BLD: 5.7 % (ref 4.8–5.6)
HDLC SERPL-MCNC: 67 MG/DL
INTERPRETATION, 910389: NORMAL
INTERPRETATION: NORMAL
LDLC SERPL CALC-MCNC: 74 MG/DL (ref 0–99)
PDF IMAGE, 910387: NORMAL
POTASSIUM SERPL-SCNC: 4.7 MMOL/L (ref 3.5–5.2)
PROT SERPL-MCNC: 6.8 G/DL (ref 6–8.5)
SODIUM SERPL-SCNC: 140 MMOL/L (ref 134–144)
TRIGL SERPL-MCNC: 104 MG/DL (ref 0–149)
VLDLC SERPL CALC-MCNC: 21 MG/DL (ref 5–40)

## 2019-09-09 NOTE — PROGRESS NOTES
Inform pt to go to my chart to see results and recommendations    Fasting sugars (glucose) between 100 and 125 or Hemoglobin A1C 5.6-6.4% are considered borderline or \"prediabetic\" implying an elevated risk for becoming diabetic in the near future. Weight loss with good healthy diet and routine, almost daily exercise may help delay or reverse this trend. Avoid sweets, limit starches and recheck fasting blood sugar and/or A1C  in 6 months.     Any questions let me know

## 2019-09-10 NOTE — PROGRESS NOTES
[phone] Spoke to patient Identified pt with two pt identifiers (Name @ )  Notified patient of her lab results and understand

## 2019-11-01 NOTE — PROGRESS NOTES
[phone] (home) attempted to call patient no answer left message to call me back in regards   Potassium ok to order per Dr Andrew Schmidt  Patient has appointment 8/7/2017 Secondary to a fall 10/8  Patient was admitted to [facility] 10/8 through 10/10  She was evaluated by Neurosurgery who recommended conservative management of SDH

## 2020-01-06 ENCOUNTER — OFFICE VISIT (OUTPATIENT)
Dept: FAMILY MEDICINE CLINIC | Age: 70
End: 2020-01-06

## 2020-01-06 VITALS
BODY MASS INDEX: 35.19 KG/M2 | WEIGHT: 191.2 LBS | RESPIRATION RATE: 18 BRPM | OXYGEN SATURATION: 96 % | DIASTOLIC BLOOD PRESSURE: 68 MMHG | HEIGHT: 62 IN | TEMPERATURE: 97.9 F | HEART RATE: 91 BPM | SYSTOLIC BLOOD PRESSURE: 118 MMHG

## 2020-01-06 DIAGNOSIS — J06.9 UPPER RESPIRATORY TRACT INFECTION, UNSPECIFIED TYPE: Primary | ICD-10-CM

## 2020-01-06 RX ORDER — AMOXICILLIN AND CLAVULANATE POTASSIUM 875; 125 MG/1; MG/1
1 TABLET, FILM COATED ORAL EVERY 12 HOURS
Qty: 10 TAB | Refills: 0 | Status: SHIPPED | OUTPATIENT
Start: 2020-01-06 | End: 2020-01-11

## 2020-01-06 RX ORDER — ALBUTEROL SULFATE 90 UG/1
2 AEROSOL, METERED RESPIRATORY (INHALATION)
Qty: 1 INHALER | Refills: 0 | Status: SHIPPED | OUTPATIENT
Start: 2020-01-06

## 2020-01-06 NOTE — PROGRESS NOTES
Chief Complaint   Patient presents with    Sinus Infection     productive cough, pressure headache, ear pain, x 1 week     1. Have you been to the ER, urgent care clinic since your last visit? Hospitalized since your last visit? No    2. Have you seen or consulted any other health care providers outside of the [address] [place] [address] since your last visit? Include any pap smears or colon screening.  No

## 2020-01-06 NOTE — PROGRESS NOTES
Patient Name: Quan Stevens   MRN: [id number]    Jb Prescott is a 81 y.o. female who presents with the following:     Patient reports congestion, swollen glands, dry cough, bilateral sinus pain, bilateral ear pain and URI symptoms for 1 week, gradually worsening since that time. Denies a history of fever, chills, chest congestion, SOB/ARGUETA and chest pain. Evaluation to date: none. Treatment to date: OTC products. Relevant PMH: reactive bronchitis. Patient reports sick contacts: no.     Review of Systems   Constitutional: Negative for fever, malaise/fatigue and weight loss. HENT: Positive for congestion, ear pain and sinus pain. Respiratory: Positive for cough. Negative for hemoptysis, shortness of breath and wheezing. Cardiovascular: Negative for chest pain, palpitations, leg swelling and PND. Gastrointestinal: Negative for abdominal pain, constipation, diarrhea, nausea and vomiting. The patient's medications, allergies, past medical history, surgical history, family history and social history were reviewed and updated where appropriate. Prior to Admission medications    Medication Sig Start Date End Date Taking? Authorizing Provider   meloxicam (MOBIC) 15 mg tablet TAKE 1 TABLET DAILY FOR OSTEOARTHRITIS 9/4/19  Yes Calli Rutledge MD   atorvastatin (LIPITOR) 20 mg tablet TAKE 1 TABLET DAILY 9/4/19  Yes Calli Rutledge MD   magnesium oxide (MAG-OX) 400 mg tablet Take 400 mg by mouth daily. Yes Provider, Historical   omega-3 fatty acids-vitamin e 1,000 mg cap Take 2 Caps by mouth. Yes Provider, Historical   escitalopram oxalate (LEXAPRO) 20 mg tablet Take 1 Tab by mouth daily. Indications: MAJOR DEPRESSIVE DISORDER 6/3/16  Yes Anneliese Donovan MD   busPIRone (BUSPAR) 10 mg tablet Take 1 Tab by mouth two (2) times a day. Indications: GENERALIZED ANXIETY DISORDER 6/3/16  Yes Anneliese Donovan MD   CALCIUM CARBONATE/VITAMIN D3 (CALCIUM + D PO) Take 1 Tab by mouth daily. Indications: SUPPLEMENT   Yes Provider, Historical   FEXOFENADINE/PSEUDOEPHEDRINE (ALLEGRA-D 12 HOUR PO) Take 1 Tab by mouth daily as needed (Allergies). Indications: SEASONAL ALLERGIES   Yes Provider, Historical   MULTIVITAMINS (MULTIPLE VITAMIN PO) Take 1 Tab by mouth daily. Indications: VITAMIN DEFICIENCY PREVENTION   Yes Provider, Historical   albuterol (PROVENTIL HFA, VENTOLIN HFA, PROAIR HFA) 90 mcg/actuation inhaler Take 2 puffs by inhalation every four (4) hours as needed for Wheezing. 2/2/15   Rhianna Cline NP       No Known Allergies      OBJECTIVE    Visit Vitals  /68 (BP 1 Location: Left arm, BP Patient Position: Sitting)   Pulse 91   Temp 97.9 °F (36.6 °C) (Oral)   Resp 18   Ht 5' 2\" (1.575 m)   Wt 191 lb 3.2 oz (86.7 kg)   SpO2 96%   BMI 34.97 kg/m²       Physical Exam  Vitals signs and nursing note reviewed. Constitutional:       General: She is not in acute distress. Appearance: She is not diaphoretic. HENT:      Head: Normocephalic and atraumatic. Right Ear: No decreased hearing noted. No middle ear effusion. Tympanic membrane is not perforated or erythematous. Left Ear: No decreased hearing noted. No middle ear effusion. Tympanic membrane is not perforated or erythematous. Nose:      Right Sinus: Maxillary sinus tenderness and frontal sinus tenderness present. Left Sinus: Maxillary sinus tenderness and frontal sinus tenderness present. Mouth/Throat:      Mouth: Mucous membranes are moist.      Pharynx: Oropharynx is clear. Uvula midline. No pharyngeal swelling, oropharyngeal exudate, posterior oropharyngeal erythema or uvula swelling. Neck:      Musculoskeletal: Normal range of motion and neck supple. Cardiovascular:      Rate and Rhythm: Normal rate and regular rhythm. Heart sounds: Normal heart sounds. No murmur. No friction rub. No gallop. Pulmonary:      Effort: Pulmonary effort is normal. No respiratory distress.       Breath sounds: Normal breath sounds. No wheezing. Lymphadenopathy:      Cervical: No cervical adenopathy. Neurological:      Mental Status: She is alert. ASSESSMENT AND PLAN  Nnamdi Galan is a 81 y.o. female who presents today for:    1. Upper respiratory tract infection, unspecified type  discussed diagnosis & treatment options, likely bacterial at this time. Will start meds below. Expected time course for resolution reviewed with patient. Reviewed signs and symptoms that would indicate a worsening medical condition which would require immediate evaluation and treatment; patient expressed understanding of plan. - amoxicillin-clavulanate (AUGMENTIN) 875-125 mg per tablet; Take 1 Tab by mouth every twelve (12) hours for 5 days. Dispense: 10 Tab; Refill: 0  - albuterol (PROVENTIL HFA, VENTOLIN HFA, PROAIR HFA) 90 mcg/actuation inhaler; Take 2 Puffs by inhalation every six (6) hours as needed for Wheezing. Dispense: 1 Inhaler; Refill: 0       Medications Discontinued During This Encounter   Medication Reason    albuterol (PROVENTIL HFA, VENTOLIN HFA, PROAIR HFA) 90 mcg/actuation inhaler Reorder           Medication risks/benefits/costs/interactions/alternatives discussed with patient. Advised patient to call back or return to office if symptoms worsen/change/persist. If patient cannot reach us or should anything more severe/urgent arise he/she should proceed directly to the nearest emergency department. Discussed expected course/resolution/complications of diagnosis in detail with patient. Patient given a written after visit summary which includes his/her diagnoses, current medications and vitals. Patient expressed understanding with the diagnosis and plan. Julieth Christine M.D.

## 2020-03-04 ENCOUNTER — OFFICE VISIT (OUTPATIENT)
Dept: FAMILY MEDICINE CLINIC | Age: 70
End: 2020-03-04

## 2020-03-04 ENCOUNTER — TELEPHONE (OUTPATIENT)
Dept: FAMILY MEDICINE CLINIC | Age: 70
End: 2020-03-04

## 2020-03-04 VITALS
TEMPERATURE: 97.8 F | HEIGHT: 62 IN | RESPIRATION RATE: 18 BRPM | BODY MASS INDEX: 35.22 KG/M2 | OXYGEN SATURATION: 98 % | HEART RATE: 82 BPM | WEIGHT: 191.4 LBS | DIASTOLIC BLOOD PRESSURE: 80 MMHG | SYSTOLIC BLOOD PRESSURE: 123 MMHG

## 2020-03-04 DIAGNOSIS — R20.2 NUMBNESS AND TINGLING: ICD-10-CM

## 2020-03-04 DIAGNOSIS — R63.5 WEIGHT GAIN: ICD-10-CM

## 2020-03-04 DIAGNOSIS — Z23 ENCOUNTER FOR IMMUNIZATION: ICD-10-CM

## 2020-03-04 DIAGNOSIS — E66.9 OBESITY, CLASS I, BMI 30-34.9: ICD-10-CM

## 2020-03-04 DIAGNOSIS — R20.0 NUMBNESS AND TINGLING: ICD-10-CM

## 2020-03-04 DIAGNOSIS — E66.01 SEVERE OBESITY (HCC): ICD-10-CM

## 2020-03-04 DIAGNOSIS — Z23 NEED FOR SHINGLES VACCINE: ICD-10-CM

## 2020-03-04 DIAGNOSIS — F33.8 SEASONAL AFFECTIVE DISORDER (HCC): ICD-10-CM

## 2020-03-04 DIAGNOSIS — R73.09 ELEVATED GLUCOSE: Primary | ICD-10-CM

## 2020-03-04 DIAGNOSIS — E55.9 VITAMIN D DEFICIENCY: ICD-10-CM

## 2020-03-04 DIAGNOSIS — E78.2 MIXED HYPERLIPIDEMIA: ICD-10-CM

## 2020-03-04 LAB — HBA1C MFR BLD HPLC: 5.5 %

## 2020-03-04 RX ORDER — ESTRADIOL 0.1 MG/G
CREAM VAGINAL
COMMUNITY
Start: 2020-02-13

## 2020-03-04 NOTE — PATIENT INSTRUCTIONS
Body Mass Index: Care Instructions Your Care Instructions Body mass index (BMI) can help you see if your weight is raising your risk for health problems. It uses a formula to compare how much you weigh with how tall you are. · A BMI lower than 18.5 is considered underweight. · A BMI between 18.5 and 24.9 is considered healthy. · A BMI between 25 and 29.9 is considered overweight. A BMI of 30 or higher is considered obese. If your BMI is in the normal range, it means that you have a lower risk for weight-related health problems. If your BMI is in the overweight or obese range, you may be at increased risk for weight-related health problems, such as high blood pressure, heart disease, stroke, arthritis or joint pain, and diabetes. If your BMI is in the underweight range, you may be at increased risk for health problems such as fatigue, lower protection (immunity) against illness, muscle loss, bone loss, hair loss, and hormone problems. BMI is just one measure of your risk for weight-related health problems. You may be at higher risk for health problems if you are not active, you eat an unhealthy diet, or you drink too much alcohol or use tobacco products. Follow-up care is a key part of your treatment and safety. Be sure to make and go to all appointments, and call your doctor if you are having problems. It's also a good idea to know your test results and keep a list of the medicines you take. How can you care for yourself at home? · Practice healthy eating habits. This includes eating plenty of fruits, vegetables, whole grains, lean protein, and low-fat dairy. · If your doctor recommends it, get more exercise. Walking is a good choice. Bit by bit, increase the amount you walk every day. Try for at least 30 minutes on most days of the week. · Do not smoke. Smoking can increase your risk for health problems.  If you need help quitting, talk to your doctor about stop-smoking programs and medicines. These can increase your chances of quitting for good. · Limit alcohol to 2 drinks a day for men and 1 drink a day for women. Too much alcohol can cause health problems. If you have a BMI higher than 25 · Your doctor may do other tests to check your risk for weight-related health problems. This may include measuring the distance around your waist. A waist measurement of more than 40 inches in men or 35 inches in women can increase the risk of weight-related health problems. · Talk with your doctor about steps you can take to stay healthy or improve your health. You may need to make lifestyle changes to lose weight and stay healthy, such as changing your diet and getting regular exercise. If you have a BMI lower than 18.5 · Your doctor may do other tests to check your risk for health problems. · Talk with your doctor about steps you can take to stay healthy or improve your health. You may need to make lifestyle changes to gain or maintain weight and stay healthy, such as getting more healthy foods in your diet and doing exercises to build muscle. Where can you learn more? Go to http://lorna-darryl.info/. Enter S176 in the search box to learn more about \"Body Mass Index: Care Instructions. \" Current as of: March 28, 2019 Content Version: 12.2 © 3178-9471 [employer]. Care instructions adapted under license by [employer] (which disclaims liability or warranty for this information). If you have questions about a medical condition or this instruction, always ask your healthcare professional. [address] any warranty or liability for your use of this information.

## 2020-03-04 NOTE — PROGRESS NOTES
NATALIE Parish Hdez 81 y.o. female  presents to the office today for follow up on chronic conditions. Blood pressure 123/80, pulse 82, temperature 97.8 °F (36.6 °C), temperature source Oral, resp. rate 18, height 5' 2\" (1.575 m), weight 191 lb 6.4 oz (86.8 kg), SpO2 98 %. Body mass index is 35.01 kg/m². Chief Complaint   Patient presents with    Cholesterol Problem     6 month f/u lipids      Elevated glucose: A1c per POC today 5.5, decreased from A1c of 5.7 on 9/6/19. Seasonal affective disorder: Pt states that she has been feeling frustrated due to weight problem. Pt notes that she never had problem with her weight in the past until she started antidepressant. Pt endorses that she would gain about 20 lbs every time she started a new medication. Had taken Zoloft and is currently on Lexapro and Wellbutrin. Pt ensures that her eating habit is not the cause. Pt is under care of psychiatry Dr. Denzel Cotto at [address] [place] [address]. Pt inquires about testing her metabolism. Hyperlipidemia: Lipid panel on 9/6/19 notable for total cholesterol 162, HDL 67, LDL 74, and triglycerides 104. Pt continues with Atorvastatin 20 mg/d. Obesity: I have reviewed/discussed the above normal BMI with the patient. Vitamin D deficiency: Pt's vitamin D levels were 59.5 on 8/16/17. Health Maintenance: Pt will receive pneumonia vaccine today. Provided pt with prescription and will follow up with local pharmacy for Shingrix vaccine. Pt admits to some numbness/tingling in upper and lower extremities, but thinks that due to sciatica. Current Outpatient Medications   Medication Sig Dispense Refill    estradioL (ESTRACE) 0.01 % (0.1 mg/gram) vaginal cream INSERT 1 GRAM VAGINALLY TWICE WEEKLY      varicella-zoster recombinant, PF, (SHINGRIX, PF,) 50 mcg/0.5 mL susr injection 0.5 mL by IntraMUSCular route once for 1 dose.  Give second dose 2 months or after first 0.5 mL 1    albuterol (PROVENTIL HFA, VENTOLIN HFA, PROAIR HFA) 90 mcg/actuation inhaler Take 2 Puffs by inhalation every six (6) hours as needed for Wheezing. 1 Inhaler 0    meloxicam (MOBIC) 15 mg tablet TAKE 1 TABLET DAILY FOR OSTEOARTHRITIS 90 Tab 1    atorvastatin (LIPITOR) 20 mg tablet TAKE 1 TABLET DAILY 90 Tab 1    magnesium oxide (MAG-OX) 400 mg tablet Take 400 mg by mouth daily.  omega-3 fatty acids-vitamin e 1,000 mg cap Take 2 Caps by mouth.  escitalopram oxalate (LEXAPRO) 20 mg tablet Take 1 Tab by mouth daily. Indications: MAJOR DEPRESSIVE DISORDER 30 Tab 0    busPIRone (BUSPAR) 10 mg tablet Take 1 Tab by mouth two (2) times a day. Indications: GENERALIZED ANXIETY DISORDER 60 Tab 1    CALCIUM CARBONATE/VITAMIN D3 (CALCIUM + D PO) Take 1 Tab by mouth daily. Indications: SUPPLEMENT      FEXOFENADINE/PSEUDOEPHEDRINE (ALLEGRA-D 12 HOUR PO) Take 1 Tab by mouth daily as needed (Allergies). Indications: SEASONAL ALLERGIES      MULTIVITAMINS (MULTIPLE VITAMIN PO) Take 1 Tab by mouth daily. Indications: VITAMIN DEFICIENCY PREVENTION       No Known Allergies  Past Medical History:   Diagnosis Date    Anxiety     AR (allergic rhinitis)     Depression     Spondylosis      Past Surgical History:   Procedure Laterality Date    HX HYSTERECTOMY       Family History   Problem Relation Age of Onset    Cancer Mother 80        lyphoma    Lung Disease Father     Heart Disease Father 80        a fib    Other Brother 40        Amlydosis     Social History     Tobacco Use    Smoking status: Former Smoker     Years: 30.00     Last attempt to quit: 2003     Years since quittin.4    Smokeless tobacco: Never Used   Substance Use Topics    Alcohol use: Yes     Alcohol/week: 0.8 standard drinks     Types: 1 Glasses of wine per week     Comment: ocassional        Review of Systems   Constitutional: Negative for chills and fever. HENT: Negative for hearing loss and tinnitus. Eyes: Negative for blurred vision and double vision.    Respiratory: Negative for cough and shortness of breath. Cardiovascular: Negative for chest pain and palpitations. Gastrointestinal: Negative for nausea and vomiting. Genitourinary: Negative for dysuria and frequency. Musculoskeletal: Negative for back pain and falls. Skin: Negative for itching and rash. Neurological: Positive for tingling. Negative for dizziness, loss of consciousness and headaches. Psychiatric/Behavioral: Negative for depression. The patient is not nervous/anxious. Physical Exam  Vitals signs and nursing note reviewed. Constitutional:       Appearance: Normal appearance. She is well-developed. HENT:      Head: Normocephalic and atraumatic. Right Ear: External ear normal.      Left Ear: External ear normal.      Nose: Nose normal.   Eyes:      Conjunctiva/sclera: Conjunctivae normal.      Pupils: Pupils are equal, round, and reactive to light. Neck:      Musculoskeletal: Normal range of motion and neck supple. Cardiovascular:      Rate and Rhythm: Normal rate and regular rhythm. Pulses: Normal pulses. Heart sounds: Normal heart sounds. Pulmonary:      Effort: Pulmonary effort is normal.      Breath sounds: Normal breath sounds. Abdominal:      General: Bowel sounds are normal.      Palpations: Abdomen is soft. Musculoskeletal: Normal range of motion. Skin:     General: Skin is warm and dry. Neurological:      Mental Status: She is alert and oriented to person, place, and time. Psychiatric:         Speech: Speech normal.         Behavior: Behavior normal.         Thought Content: Thought content normal.         Judgment: Judgment normal.           ASSESSMENT and PLAN  Diagnoses and all orders for this visit:    1. Elevated glucose  A1c today 5.5. Well managed without medication.   -     AMB POC HEMOGLOBIN P2D  -     METABOLIC PANEL, COMPREHENSIVE    2. Mixed hyperlipidemia  Presumed stable, will assess levels today.  Pt continues with Atorvastatin 20 mg/d.   -     LIPID PANEL    3. Obesity, Class I, BMI 30-34.9  I have reviewed/discussed the above normal BMI with the patient. I have recommended the following interventions: dietary management education, guidance, and counseling, encourage exercise and monitor weight . 4. Seasonal affective disorder ([facility] [address]  Discussed with pt that she could try taking 1/2 dose of Lexapro and Buspar and monitor for weight improvement. Also advised pt to discuss with psychiatry Dr. Matias Garay about switching to alternative medication, but since changing medication seemed to cause weight gain according to pt's personal experience, I do not know how helpful this option could be. Pt agrees and will let me know what psychiatrist said. Will also proceed with some lab work to test metabolism. -     CBC W/O DIFF    5. Weight gain  Same as #4  -     INSULIN-LIKE GROWTH FACTOR 1  -     INSULIN, FREE & TOTAL  -     TSH 3RD GENERATION  -     T4, FREE    6. Need for shingles vaccine  Provided pt with prescription and will follow up with local pharmacy for Shingrix vaccine. -     varicella-zoster recombinant, PF, (SHINGRIX, PF,) 50 mcg/0.5 mL susr injection; 0.5 mL by IntraMUSCular route once for 1 dose. Give second dose 2 months or after first    7. Severe obesity ([facility] [address]  I have reviewed/discussed the above normal BMI with the patient. I have recommended the following interventions: dietary management education, guidance, and counseling, encourage exercise and monitor weight . 8. Encounter for immunization  PMA23 administered today in office.   -     PNEUMOCOCCAL POLYSACCHARIDE VACCINE, 23-VALENT, ADULT OR IMMUNOSUPPRESSED PT DOSE,    9. Numbness and tingling  Will assess ab results. -     VITAMIN B12 & FOLATE    10. Vitamin D deficiency  Will assess ab results. -     VITAMIN D, 25 HYDROXY    Follow-up and Dispositions    · Return in about 6 months (around 9/4/2020) for chronic follow up.         Medication risks/benefits/costs/interactions/alternatives discussed with patient. Advised patient to call back or return to office if symptoms worsen/change/persist.  If patient cannot reach us or should anything more severe/urgent arise he/she should proceed directly to the nearest emergency department. Discussed expected course/resolution/complications of diagnosis in detail with patient. Patient given a written after visit summary which includes diagnoses, current medications and vitals. Patient expressed understanding with the diagnosis and plan. Written by khari Fairbanks, as dictated by Kumar Darden M.D.    2:29 PM - 2:48 PM    Total time spent with the patient 19 minutes, greater than 50% of time spent counseling patient.

## 2020-03-04 NOTE — TELEPHONE ENCOUNTER
Placed outgoing call to patient to discuss weight gain and her medications. Left VM on both patient's home and cell phone. Will attempt again tomorrow.     Luis Martins, PharmD, Ruth Bender

## 2020-03-04 NOTE — PROGRESS NOTES
Chief Complaint   Patient presents with    Cholesterol Problem     6 month f/u lipids       Reviewed Record in preparation for visit and have obtained necessary documentation. Identified pt with two pt identifiers (Name @ )    Health Maintenance Due   Topic    Shingrix Vaccine Age 50> (1 of 2)    GLAUCOMA SCREENING Q2Y     Pneumococcal 65+ years (2 of 2 - PPSV23)         1. Have you been to the ER, urgent care clinic since your last visit? Hospitalized since your last visit?  no    2. Have you seen or consulted any other health care providers outside of the [address] [place] [address] since your last visit? Include any pap smears or colon screening.  no

## 2020-03-05 ENCOUNTER — TELEPHONE (OUTPATIENT)
Dept: INTERNAL MEDICINE CLINIC | Age: 70
End: 2020-03-05

## 2020-03-05 ENCOUNTER — HOSPITAL ENCOUNTER (OUTPATIENT)
Dept: LAB | Age: 70
Discharge: HOME | End: 2020-03-05
Payer: MEDICARE

## 2020-03-05 PROCEDURE — 84439 ASSAY OF FREE THYROXINE: CPT

## 2020-03-05 PROCEDURE — 82306 VITAMIN D 25 HYDROXY: CPT

## 2020-03-05 PROCEDURE — 36415 COLL VENOUS BLD VENIPUNCTURE: CPT

## 2020-03-05 PROCEDURE — 80053 COMPREHEN METABOLIC PANEL: CPT

## 2020-03-05 PROCEDURE — 82607 VITAMIN B-12: CPT

## 2020-03-05 PROCEDURE — 80061 LIPID PANEL: CPT

## 2020-03-05 PROCEDURE — 85027 COMPLETE CBC AUTOMATED: CPT

## 2020-03-05 PROCEDURE — 84443 ASSAY THYROID STIM HORMONE: CPT

## 2020-03-05 PROCEDURE — 84305 ASSAY OF SOMATOMEDIN: CPT

## 2020-03-05 NOTE — TELEPHONE ENCOUNTER
----- Message from Padmini Stearns MD sent at 3/4/2020  2:35 PM EST -----  Regarding: weight with psych meds  Raven,  Can you call her and find out if her weight is being caused by her psych meds  Her weight gain started according to pt with  lexapro and Buspar

## 2020-03-05 NOTE — TELEPHONE ENCOUNTER
Placed an outgoing call to patient (2 identifiers used) to discuss questions surround her medications and weight gain. Patient states that she has been frustrated with her weight and wanted to know if her escitalopram or buspirone were contributing. Discussed with patient that buspirone is typically a weight neutral medication and when compared to placebo in the studies there was no difference in weight gain with escitalopram.  Although medications can cause different effects in people. I did tell her that in reviewing her chart that she was on medications previously that are associated with significant weight gain: olanzapine, hydroxyzine and latuda. Patient acknowledged that the did gain about 20 lb with latuda therapy. This could just be carryover from not being able to lose that weight she gained previously. Patient did report that she talked to her mental healthcare provider and she decreased the dose of the escitalopram to 10 mg daily since patient requested. Recommended that patient keep track when meds are added/discontinued and her weight to help her better see if this could be a contributor to weight gain.     Olegario Calhoun, PharmD, [facility]

## 2020-03-09 ENCOUNTER — OFFICE VISIT (OUTPATIENT)
Dept: FAMILY MEDICINE CLINIC | Age: 70
End: 2020-03-09

## 2020-03-09 VITALS
TEMPERATURE: 97.4 F | OXYGEN SATURATION: 96 % | DIASTOLIC BLOOD PRESSURE: 74 MMHG | WEIGHT: 190.2 LBS | HEIGHT: 62 IN | SYSTOLIC BLOOD PRESSURE: 132 MMHG | HEART RATE: 80 BPM | BODY MASS INDEX: 35 KG/M2 | RESPIRATION RATE: 17 BRPM

## 2020-03-09 DIAGNOSIS — H69.83 DYSFUNCTION OF BOTH EUSTACHIAN TUBES: ICD-10-CM

## 2020-03-09 DIAGNOSIS — J31.0 RHINOSINUSITIS: ICD-10-CM

## 2020-03-09 DIAGNOSIS — H91.93 DECREASED HEARING OF BOTH EARS: ICD-10-CM

## 2020-03-09 DIAGNOSIS — J32.9 RHINOSINUSITIS: ICD-10-CM

## 2020-03-09 DIAGNOSIS — J34.89 SINUS DRAINAGE: Primary | ICD-10-CM

## 2020-03-09 LAB
25(OH)D3+25(OH)D2 SERPL-MCNC: 47.3 NG/ML (ref 30–100)
ALBUMIN SERPL-MCNC: 4.7 G/DL (ref 3.8–4.8)
ALBUMIN/GLOB SERPL: 2 {RATIO} (ref 1.2–2.2)
ALP SERPL-CCNC: 96 IU/L (ref 39–117)
ALT SERPL-CCNC: 31 IU/L (ref 0–32)
AST SERPL-CCNC: 26 IU/L (ref 0–40)
BILIRUB SERPL-MCNC: 0.6 MG/DL (ref 0–1.2)
BUN SERPL-MCNC: 22 MG/DL (ref 8–27)
BUN/CREAT SERPL: 23 (ref 12–28)
CALCIUM SERPL-MCNC: 9.6 MG/DL (ref 8.7–10.3)
CHLORIDE SERPL-SCNC: 105 MMOL/L (ref 96–106)
CHOLEST SERPL-MCNC: 182 MG/DL (ref 100–199)
CO2 SERPL-SCNC: 21 MMOL/L (ref 20–29)
CREAT SERPL-MCNC: 0.96 MG/DL (ref 0.57–1)
ERYTHROCYTE [DISTWIDTH] IN BLOOD BY AUTOMATED COUNT: 12.8 % (ref 11.7–15.4)
FOLATE SERPL-MCNC: 18.8 NG/ML
GLOBULIN SER CALC-MCNC: 2.3 G/DL (ref 1.5–4.5)
GLUCOSE SERPL-MCNC: 100 MG/DL (ref 65–99)
HCT VFR BLD AUTO: 39.4 % (ref 34–46.6)
HDLC SERPL-MCNC: 61 MG/DL
HGB BLD-MCNC: 13.3 G/DL (ref 11.1–15.9)
IGF-I SERPL-MCNC: 113 NG/ML (ref 38–163)
INSULIN FREE SERPL-ACNC: 11 UU/ML
INSULIN SERPL-ACNC: 12 UU/ML
INTERPRETATION, 910389: NORMAL
LDLC SERPL CALC-MCNC: 90 MG/DL (ref 0–99)
MCH RBC QN AUTO: 28.7 PG (ref 26.6–33)
MCHC RBC AUTO-ENTMCNC: 33.8 G/DL (ref 31.5–35.7)
MCV RBC AUTO: 85 FL (ref 79–97)
PLATELET # BLD AUTO: 261 X10E3/UL (ref 150–450)
POTASSIUM SERPL-SCNC: 4.7 MMOL/L (ref 3.5–5.2)
PROT SERPL-MCNC: 7 G/DL (ref 6–8.5)
RBC # BLD AUTO: 4.64 X10E6/UL (ref 3.77–5.28)
SODIUM SERPL-SCNC: 141 MMOL/L (ref 134–144)
T4 FREE SERPL-MCNC: 1.18 NG/DL (ref 0.82–1.77)
TRIGL SERPL-MCNC: 155 MG/DL (ref 0–149)
TSH SERPL DL<=0.005 MIU/L-ACNC: 1.68 UIU/ML (ref 0.45–4.5)
VIT B12 SERPL-MCNC: 682 PG/ML (ref 232–1245)
VLDLC SERPL CALC-MCNC: 31 MG/DL (ref 5–40)
WBC # BLD AUTO: 11.7 X10E3/UL (ref 3.4–10.8)

## 2020-03-09 RX ORDER — AMOXICILLIN AND CLAVULANATE POTASSIUM 500; 125 MG/1; MG/1
1 TABLET, FILM COATED ORAL 2 TIMES DAILY
Qty: 14 TAB | Refills: 0 | Status: SHIPPED | OUTPATIENT
Start: 2020-03-09 | End: 2020-03-16

## 2020-03-09 NOTE — PROGRESS NOTES
Lakesha Cheadle [facility] Note      Subjective:     Chief Complaint   Patient presents with    Ear Fullness     x 1 week    Ear Pain     x 1 week    Cough     x 1 week      Lucille Bonilla is a 81y.o. year old female who presents for evaluation of the following:        Ear Fulness: Onset 1 week ago   Associated cough with phlegm 1 week ago , sore throat when coughing  Previous Symptoms Now Resolved: None  Treatment:allegra D, inhaler, flonase  Endorses chest tightness, intermittent headache  No current chest tightness but occurs with smell of hand   Denies fever, chills, hemoptysis, chest pain, shortness of breath, vomiting, diarrhea        Review of Systems   Pertinent positives and negative per HPI. All other systems  reviewed are negative for a Comprehensive ROS (10+). Past Medical History:   Diagnosis Date    Anxiety     AR (allergic rhinitis)     Depression     Spondylosis         Social History     Socioeconomic History    Marital status:      Spouse name: Not on file    Number of children: Not on file    Years of education: Not on file    Highest education level: Not on file   Occupational History    Not on file   Social Needs    Financial resource strain: Not on file    Food insecurity:     Worry: Not on file     Inability: Not on file    Transportation needs:     Medical: Not on file     Non-medical: Not on file   Tobacco Use    Smoking status: Former Smoker     Years: 30.00     Last attempt to quit: 2003     Years since quittin.5    Smokeless tobacco: Never Used   Substance and Sexual Activity    Alcohol use:  Yes     Alcohol/week: 0.8 standard drinks     Types: 1 Glasses of wine per week     Comment: ocassional    Drug use: No    Sexual activity: Yes     Partners: Male   Lifestyle    Physical activity:     Days per week: Not on file     Minutes per session: Not on file    Stress: Not on file   Relationships    Social connections:     Talks on phone: Not on file     Gets together: Not on file     Attends [employer] service: Not on file     Active member of club or organization: Not on file     Attends meetings of clubs or organizations: Not on file     Relationship status: Not on file    Intimate partner violence:     Fear of current or ex partner: Not on file     Emotionally abused: Not on file     Physically abused: Not on file     Forced sexual activity: Not on file   Other Topics Concern    Not on file   Social History Narrative    63year old   female voluntarily admitted after declaring intent to commit suicide by injecting an insulin overdose. Pt's trigger is the anniversary of her son's suicide in [facility] 3 years ago. Pt. Had son arrested for stealing her jewelry to sell for drug money. 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 She sees DR Durga Olivera once every three months for psychiatric med mnagement. She has been resistant to any medication changes proposed. She has been on Well butrin 150 mg po tid for \"years\". Pt. Sees a therapist in [place] \"PRN\". She does not participate in therapy, grif groups, or perez groups on any regular basis. Family History   Problem Relation Age of Onset    Cancer Mother 80        lyphoma    Lung Disease Father     Heart Disease Father 80        a fib    Other Brother 40        Amlydosis       Current Outpatient Medications   Medication Sig    estradioL (ESTRACE) 0.01 % (0.1 mg/gram) vaginal cream INSERT 1 GRAM VAGINALLY TWICE WEEKLY    albuterol (PROVENTIL HFA, VENTOLIN HFA, PROAIR HFA) 90 mcg/actuation inhaler Take 2 Puffs by inhalation every six (6) hours as needed for Wheezing.  meloxicam (MOBIC) 15 mg tablet TAKE 1 TABLET DAILY FOR OSTEOARTHRITIS    atorvastatin (LIPITOR) 20 mg tablet TAKE 1 TABLET DAILY    magnesium oxide (MAG-OX) 400 mg tablet Take 400 mg by mouth daily.  omega-3 fatty acids-vitamin e 1,000 mg cap Take 2 Caps by mouth.  escitalopram oxalate (LEXAPRO) 20 mg tablet Take 1 Tab by mouth daily. Indications: MAJOR DEPRESSIVE DISORDER (Patient taking differently: Take 10 mg by mouth daily. Indications: major depressive disorder)    busPIRone (BUSPAR) 10 mg tablet Take 1 Tab by mouth two (2) times a day. Indications: GENERALIZED ANXIETY DISORDER    CALCIUM CARBONATE/VITAMIN D3 (CALCIUM + D PO) Take 1 Tab by mouth daily. Indications: SUPPLEMENT    FEXOFENADINE/PSEUDOEPHEDRINE (ALLEGRA-D 12 HOUR PO) Take 1 Tab by mouth daily as needed (Allergies). Indications: SEASONAL ALLERGIES    MULTIVITAMINS (MULTIPLE VITAMIN PO) Take 1 Tab by mouth daily. Indications: VITAMIN DEFICIENCY PREVENTION     No current facility-administered medications for this visit. Objective:     Vitals:    03/09/20 1128   BP: 132/74   Pulse: 80   Resp: 17   Temp: 97.4 °F (36.3 °C)   TempSrc: Oral   SpO2: 96%   Weight: 190 lb 3.2 oz (86.3 kg)   Height: 5' 2\" (1.575 m)       Physical Examination:  General: Alert, cooperative, no distress, appears stated age. Eyes: Conjunctivae clear. Pupils equally round and reactive to light, Extraocular muscles intact. Ears: Normal external ear canals both ears. Tympanic membranes clear and no mobile bilaterally  Nose: Nares normal. Septum midline. Mucosa normal. No drainage or sinus tenderness. Mouth/Throat: Lips, mucosa, and tongue normal. No oropharyngeal erythema. No tonsillar enlargement or exudate. Neck: Supple, symmetrical, trachea midline, no adenopathy. No thyroid enlargement/tenderness/nodules  Respiratory: Breathing comfortably, in no acute respiratory distress. Clear to auscultation bilaterally. Normal inspiratory and expiratory ratio. Cardiovascular: Regular rate and rhythm, S1, S2 normal, no murmur, click, rub or gallop.   -Extremities no edema. Pulses 2+ and symmetric radial   Abdomen: Soft, non-tender, not distended.  Bowel sounds normal.  MSK: Extremities normal appearing, atraumatic, no effusion. Gait steady and unassisted. Skin: Skin color, texture, turgor normal. No rashes or lesions on exposed skin. Lymph nodes: Cervical, supraclavicular nodes normal.  Neurologic:   - Unable to hear finger rub  - Unable to hear conversational volume   Psychiatric: Affect appropriate      Office Visit on 03/04/2020   Component Date Value Ref Range Status    Hemoglobin A1c (POC) 03/04/2020 5.5  % Final           Assessment/ Plan:   Diagnoses and all orders for this visit:    1. Sinus drainage  -     REFERRAL TO ENT-OTOLARYNGOLOGY    2. Decreased hearing of both ears  -     REFERRAL TO ENT-OTOLARYNGOLOGY    3. Dysfunction of both eustachian tubes  -     REFERRAL TO ENT-OTOLARYNGOLOGY    4. Rhinosinusitis  -     amoxicillin-clavulanate (AUGMENTIN) 500-125 mg per tablet; Take 1 Tab by mouth two (2) times a day for 7 days. Mild URI with cough vs sinusitis. No SIRS. Trial of otc meds for symptom relief discussed and listed in patient instructions- nasal steroid + mucinex + antihistamine + sinus rinse + otc analgesia + humidifier prn  - Antibiotic to treat prolonged symptoms concerning for bacterial infection. ENT referral for hearing loss as patient would like more immediate relief of symptom since she has international tri planned in 1 week. Educated patient on red flag symptoms to warrant return to clinic or emergency room visit. I have discussed the diagnosis with the patient and the intended plan as seen in the above orders. The patient has been offered or received an after-visit summary and questions were answered concerning future plans. I have discussed medication side effects and warnings with the patient as well. Follow-up and Dispositions    · Return if symptoms worsen or fail to improve.          Signed,    Yanelis Scott MD  3/9/2020

## 2020-03-09 NOTE — PROGRESS NOTES
Chief Complaint   Patient presents with    Ear Fullness     x 1 week    Ear Pain     x 1 week    Cough     x 1 week      1. Have you been to the ER, urgent care clinic since your last visit? Hospitalized since your last visit? No    2. Have you seen or consulted any other health care providers outside of the [facility] since your last visit? Include any pap smears or colon screening.  No

## 2020-03-31 DIAGNOSIS — M17.11 PRIMARY OSTEOARTHRITIS OF RIGHT KNEE: ICD-10-CM

## 2020-03-31 RX ORDER — MELOXICAM 15 MG/1
TABLET ORAL
Qty: 90 TAB | Refills: 1 | Status: SHIPPED | OUTPATIENT
Start: 2020-03-31 | End: 2021-06-03

## 2020-03-31 NOTE — TELEPHONE ENCOUNTER
Last visit 03/09/2020 MD Caitlin Zamora   Next appointment 09/2/2020 MD Ca   Previous refill encounter(s) 09/04/2019 Mobic #90 with 1 refill     Requested Prescriptions     Pending Prescriptions Disp Refills    meloxicam (MOBIC) 15 mg tablet 90 Tab 1     Sig: Take 1 tab by mouth every day for Osteoarthritis

## 2020-08-23 DIAGNOSIS — E78.2 MIXED HYPERLIPIDEMIA: ICD-10-CM

## 2020-08-23 RX ORDER — ATORVASTATIN CALCIUM 20 MG/1
TABLET, FILM COATED ORAL
Qty: 90 TAB | Refills: 1 | Status: SHIPPED | OUTPATIENT
Start: 2020-08-23 | End: 2021-02-28

## 2020-08-29 ENCOUNTER — OFFICE VISIT (OUTPATIENT)
Dept: FAMILY MEDICINE CLINIC | Age: 70
End: 2020-08-29
Payer: MEDICARE

## 2020-08-29 VITALS
HEIGHT: 62 IN | OXYGEN SATURATION: 98 % | BODY MASS INDEX: 34.45 KG/M2 | WEIGHT: 187.2 LBS | SYSTOLIC BLOOD PRESSURE: 121 MMHG | DIASTOLIC BLOOD PRESSURE: 63 MMHG | RESPIRATION RATE: 18 BRPM | HEART RATE: 78 BPM | TEMPERATURE: 98.2 F

## 2020-08-29 DIAGNOSIS — R53.81 MALAISE AND FATIGUE: ICD-10-CM

## 2020-08-29 DIAGNOSIS — R53.83 MALAISE AND FATIGUE: ICD-10-CM

## 2020-08-29 DIAGNOSIS — E66.9 OBESITY, CLASS I, BMI 30-34.9: ICD-10-CM

## 2020-08-29 DIAGNOSIS — R51.9 NONINTRACTABLE HEADACHE, UNSPECIFIED CHRONICITY PATTERN, UNSPECIFIED HEADACHE TYPE: ICD-10-CM

## 2020-08-29 DIAGNOSIS — R73.09 ELEVATED GLUCOSE: ICD-10-CM

## 2020-08-29 DIAGNOSIS — Z00.00 ENCOUNTER FOR MEDICARE ANNUAL WELLNESS EXAM: ICD-10-CM

## 2020-08-29 DIAGNOSIS — E78.2 MIXED HYPERLIPIDEMIA: Primary | ICD-10-CM

## 2020-08-29 PROCEDURE — G8399 PT W/DXA RESULTS DOCUMENT: HCPCS | Performed by: FAMILY MEDICINE

## 2020-08-29 PROCEDURE — 1090F PRES/ABSN URINE INCON ASSESS: CPT | Performed by: FAMILY MEDICINE

## 2020-08-29 PROCEDURE — G0463 HOSPITAL OUTPT CLINIC VISIT: HCPCS | Performed by: FAMILY MEDICINE

## 2020-08-29 PROCEDURE — G0439 PPPS, SUBSEQ VISIT: HCPCS | Performed by: FAMILY MEDICINE

## 2020-08-29 PROCEDURE — 99214 OFFICE O/P EST MOD 30 MIN: CPT | Performed by: FAMILY MEDICINE

## 2020-08-29 PROCEDURE — G9717 DOC PT DX DEP/BP F/U NT REQ: HCPCS | Performed by: FAMILY MEDICINE

## 2020-08-29 PROCEDURE — 1101F PT FALLS ASSESS-DOCD LE1/YR: CPT | Performed by: FAMILY MEDICINE

## 2020-08-29 PROCEDURE — 3017F COLORECTAL CA SCREEN DOC REV: CPT | Performed by: FAMILY MEDICINE

## 2020-08-29 PROCEDURE — G8417 CALC BMI ABV UP PARAM F/U: HCPCS | Performed by: FAMILY MEDICINE

## 2020-08-29 PROCEDURE — G8427 DOCREV CUR MEDS BY ELIG CLIN: HCPCS | Performed by: FAMILY MEDICINE

## 2020-08-29 PROCEDURE — G8536 NO DOC ELDER MAL SCRN: HCPCS | Performed by: FAMILY MEDICINE

## 2020-08-29 PROCEDURE — G9899 SCRN MAM PERF RSLTS DOC: HCPCS | Performed by: FAMILY MEDICINE

## 2020-08-29 NOTE — PROGRESS NOTES
Chief Complaint   Patient presents with    Pain (Chronic)     1. Have you been to the ER, urgent care clinic since your last visit? Hospitalized since your last visit? No    2. Have you seen or consulted any other health care providers outside of the [address] [place] [address] since your last visit? Include any pap smears or colon screening.  No

## 2020-08-29 NOTE — PROGRESS NOTES
This is the Subsequent Medicare Annual Wellness Exam, performed 12 months or more after the Initial AWV or the last Subsequent AWV    I have reviewed the patient's medical history in detail and updated the computerized patient record. 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    Depression F32.9    Acute meniscal tear of knee S83.209A    Obesity, Class I, BMI 30-34.9 E66.9    Seasonal affective disorder (HCC) F33.8    Primary osteoarthritis of right knee M17.11    Mixed hyperlipidemia E78.2    Severe obesity ([address] E66.01     Past Medical History:   Diagnosis Date    Anxiety     AR (allergic rhinitis)     Depression     Spondylosis       Past Surgical History:   Procedure Laterality Date    HX HYSTERECTOMY       Current Outpatient Medications   Medication Sig Dispense Refill    atorvastatin (LIPITOR) 20 mg tablet TAKE 1 TABLET BY MOUTH EVERY DAY 90 Tab 1    meloxicam (MOBIC) 15 mg tablet Take 1 tab by mouth every day for Osteoarthritis 90 Tab 1    estradioL (ESTRACE) 0.01 % (0.1 mg/gram) vaginal cream INSERT 1 GRAM VAGINALLY TWICE WEEKLY      albuterol (PROVENTIL HFA, VENTOLIN HFA, PROAIR HFA) 90 mcg/actuation inhaler Take 2 Puffs by inhalation every six (6) hours as needed for Wheezing. 1 Inhaler 0    magnesium oxide (MAG-OX) 400 mg tablet Take 400 mg by mouth daily.  omega-3 fatty acids-vitamin e 1,000 mg cap Take 2 Caps by mouth.  escitalopram oxalate (LEXAPRO) 20 mg tablet Take 1 Tab by mouth daily. Indications: MAJOR DEPRESSIVE DISORDER (Patient taking differently: Take 10 mg by mouth daily. Indications: major depressive disorder) 30 Tab 0    busPIRone (BUSPAR) 10 mg tablet Take 1 Tab by mouth two (2) times a day.  Indications: GENERALIZED ANXIETY DISORDER 60 Tab 1    CALCIUM CARBONATE/VITAMIN D3 (CALCIUM + D PO) Take 1 Tab by mouth daily. Indications: SUPPLEMENT      FEXOFENADINE/PSEUDOEPHEDRINE (ALLEGRA-D 12 HOUR PO) Take 1 Tab by mouth daily as needed (Allergies). Indications: SEASONAL ALLERGIES      MULTIVITAMINS (MULTIPLE VITAMIN PO) Take 1 Tab by mouth daily. Indications: VITAMIN DEFICIENCY PREVENTION       No Known Allergies    Family History   Problem Relation Age of Onset    Cancer Mother 80        lyphoma    Lung Disease Father     Heart Disease Father 80        a fib    Other Brother 40        Amlydosis     Social History     Tobacco Use    Smoking status: Former Smoker     Years: 30.00     Last attempt to quit: 2003     Years since quittin.9    Smokeless tobacco: Never Used   Substance Use Topics    Alcohol use:  Yes     Alcohol/week: 0.8 standard drinks     Types: 1 Glasses of wine per week     Comment: ocassional       Depression Risk Factor Screening:     3 most recent PHQ Screens 2020   Little interest or pleasure in doing things Not at all   Feeling down, depressed, irritable, or hopeless Not at all   Total Score PHQ 2 0   Trouble falling or staying asleep, or sleeping too much -   Feeling tired or having little energy -   Poor appetite, weight loss, or overeating -   Feeling bad about yourself - or that you are a failure or have let yourself or your family down -   Trouble concentrating on things such as school, work, reading, or watching TV -   Moving or speaking so slowly that other people could have noticed; or the opposite being so fidgety that others notice -   Thoughts of being better off dead, or hurting yourself in some way -   PHQ 9 Score -   How difficult have these problems made it for you to do your work, take care of your home and get along with others -       Alcohol Risk Factor Screening:   Do you average 1 drink per night or more than 7 drinks a week:  No    On any one occasion in the past three months have you have had more than 3 drinks containing alcohol:  No      Functional Ability and Level of Safety:   Hearing: Hearing is good. Activities of Daily Living: The home contains: no safety equipment. Patient does total self care     Ambulation: with no difficulty     Fall Risk:  Fall Risk Assessment, last 12 mths 8/29/2020   Able to walk? Yes   Fall in past 12 months? No     Abuse Screen:  Patient is not abused       Cognitive Screening   Has your family/caregiver stated any concerns about your memory: no         Patient Care Team   Patient Care Team:  Annamaria Ernst MD as PCP - Ja Mead MD as PCP - [facility] Empaneled Provider    Assessment/Plan   Education and counseling provided:  Are appropriate based on today's review and evaluation    Diagnoses and all orders for this visit:    1. Mixed hyperlipidemia    2. Elevated glucose    3. Obesity, Class I, BMI 30-34.9    4.  Nonintractable headache, unspecified chronicity pattern, unspecified headache type        Health Maintenance Due   Topic Date Due    Shingrix Vaccine Age 49> (1 of 2) 12/31/2002    GLAUCOMA SCREENING Q2Y  08/30/2019    Medicare Yearly Exam  09/04/2020

## 2020-08-29 NOTE — PATIENT INSTRUCTIONS
Body Mass Index: Care Instructions Your Care Instructions Body mass index (BMI) can help you see if your weight is raising your risk for health problems. It uses a formula to compare how much you weigh with how tall you are. · A BMI lower than 18.5 is considered underweight. · A BMI between 18.5 and 24.9 is considered healthy. · A BMI between 25 and 29.9 is considered overweight. A BMI of 30 or higher is considered obese. If your BMI is in the normal range, it means that you have a lower risk for weight-related health problems. If your BMI is in the overweight or obese range, you may be at increased risk for weight-related health problems, such as high blood pressure, heart disease, stroke, arthritis or joint pain, and diabetes. If your BMI is in the underweight range, you may be at increased risk for health problems such as fatigue, lower protection (immunity) against illness, muscle loss, bone loss, hair loss, and hormone problems. BMI is just one measure of your risk for weight-related health problems. You may be at higher risk for health problems if you are not active, you eat an unhealthy diet, or you drink too much alcohol or use tobacco products. Follow-up care is a key part of your treatment and safety. Be sure to make and go to all appointments, and call your doctor if you are having problems. It's also a good idea to know your test results and keep a list of the medicines you take. How can you care for yourself at home? · Practice healthy eating habits. This includes eating plenty of fruits, vegetables, whole grains, lean protein, and low-fat dairy. · If your doctor recommends it, get more exercise. Walking is a good choice. Bit by bit, increase the amount you walk every day. Try for at least 30 minutes on most days of the week. · Do not smoke. Smoking can increase your risk for health problems.  If you need help quitting, talk to your doctor about stop-smoking programs and medicines. These can increase your chances of quitting for good. · Limit alcohol to 2 drinks a day for men and 1 drink a day for women. Too much alcohol can cause health problems. If you have a BMI higher than 25 · Your doctor may do other tests to check your risk for weight-related health problems. This may include measuring the distance around your waist. A waist measurement of more than 40 inches in men or 35 inches in women can increase the risk of weight-related health problems. · Talk with your doctor about steps you can take to stay healthy or improve your health. You may need to make lifestyle changes to lose weight and stay healthy, such as changing your diet and getting regular exercise. If you have a BMI lower than 18.5 · Your doctor may do other tests to check your risk for health problems. · Talk with your doctor about steps you can take to stay healthy or improve your health. You may need to make lifestyle changes to gain or maintain weight and stay healthy, such as getting more healthy foods in your diet and doing exercises to build muscle. Where can you learn more? Go to http://www.deal.com/ Enter S176 in the search box to learn more about \"Body Mass Index: Care Instructions. \" Current as of: December 11, 2019               Content Version: 12.5 © 1967-5845 [employer]. Care instructions adapted under license by [employer] (which disclaims liability or warranty for this information). If you have questions about a medical condition or this instruction, always ask your healthcare professional. [address] any warranty or liability for your use of this information.

## 2020-08-29 NOTE — PROGRESS NOTES
HISTORY OF PRESENT ILLNESS  Dick Iqbal is a 81 y.o. female. Blood pressure 121/63, pulse 78, temperature 98.2 °F (36.8 °C), temperature source Oral, resp. rate 18, height 5' 2\" (1.575 m), weight 187 lb 3.2 oz (84.9 kg), SpO2 98 %. Chief Complaint   Patient presents with    Pain (Chronic)       HPI   Pt has c/o of low grde HA x 2 months  Nagging 2-3/10  Frontal in head region  No N/V  No vision or balance changes  No dizziness  No symptoms of migraine  Pt drinks plenty of fluids  Wake up with CHEEMA  Has it throughout the day      Current Outpatient Medications   Medication Sig Dispense Refill    atorvastatin (LIPITOR) 20 mg tablet TAKE 1 TABLET BY MOUTH EVERY DAY 90 Tab 1    meloxicam (MOBIC) 15 mg tablet Take 1 tab by mouth every day for Osteoarthritis 90 Tab 1    estradioL (ESTRACE) 0.01 % (0.1 mg/gram) vaginal cream INSERT 1 GRAM VAGINALLY TWICE WEEKLY      albuterol (PROVENTIL HFA, VENTOLIN HFA, PROAIR HFA) 90 mcg/actuation inhaler Take 2 Puffs by inhalation every six (6) hours as needed for Wheezing. 1 Inhaler 0    magnesium oxide (MAG-OX) 400 mg tablet Take 400 mg by mouth daily.  omega-3 fatty acids-vitamin e 1,000 mg cap Take 2 Caps by mouth.  escitalopram oxalate (LEXAPRO) 20 mg tablet Take 1 Tab by mouth daily. Indications: MAJOR DEPRESSIVE DISORDER (Patient taking differently: Take 10 mg by mouth daily. Indications: major depressive disorder) 30 Tab 0    busPIRone (BUSPAR) 10 mg tablet Take 1 Tab by mouth two (2) times a day. Indications: GENERALIZED ANXIETY DISORDER 60 Tab 1    CALCIUM CARBONATE/VITAMIN D3 (CALCIUM + D PO) Take 1 Tab by mouth daily. Indications: SUPPLEMENT      FEXOFENADINE/PSEUDOEPHEDRINE (ALLEGRA-D 12 HOUR PO) Take 1 Tab by mouth daily as needed (Allergies). Indications: SEASONAL ALLERGIES      MULTIVITAMINS (MULTIPLE VITAMIN PO) Take 1 Tab by mouth daily.  Indications: VITAMIN DEFICIENCY PREVENTION       No Known Allergies  Past Medical History:   Diagnosis Date  Anxiety     AR (allergic rhinitis)     Depression     Spondylosis      Past Surgical History:   Procedure Laterality Date    HX HYSTERECTOMY       Family History   Problem Relation Age of Onset    Cancer Mother 80        lyphoma    Lung Disease Father     Heart Disease Father 80        a fib    Other Brother 40        Amlydosis     Social History     Tobacco Use    Smoking status: Former Smoker     Years: 30.00     Last attempt to quit: 2003     Years since quittin.9    Smokeless tobacco: Never Used   Substance Use Topics    Alcohol use: Yes     Alcohol/week: 0.8 standard drinks     Types: 1 Glasses of wine per week     Comment: ocassional       Review of Systems   Constitutional: Negative. Negative for malaise/fatigue. Eyes: Negative for blurred vision. Respiratory: Negative for shortness of breath. Cardiovascular: Negative for chest pain and leg swelling. Musculoskeletal: Negative. Neurological: Negative. Negative for dizziness and headaches. All other systems reviewed and are negative. Physical Exam  Vitals signs and nursing note reviewed. Constitutional:       General: She is not in acute distress. Appearance: She is well-developed. She is not diaphoretic. HENT:      Head: Normocephalic and atraumatic. Neck:      Vascular: No carotid bruit. Cardiovascular:      Rate and Rhythm: Normal rate and regular rhythm. Heart sounds: Normal heart sounds. No murmur. No friction rub. No gallop. Pulmonary:      Effort: Pulmonary effort is normal. No respiratory distress. Breath sounds: Normal breath sounds. No wheezing or rales. Neurological:      Mental Status: She is alert and oriented to person, place, and time. Psychiatric:         Behavior: Behavior normal.         Thought Content: Thought content normal.         Judgment: Judgment normal.         ASSESSMENT and PLAN  Diagnoses and all orders for this visit:    1.  Mixed hyperlipidemia  - METABOLIC PANEL, COMPREHENSIVE; Future  -     LIPID PANEL; Future  Stable check labs    2. Elevated glucose  - will check labs  -     HEMOGLOBIN A1C WITH EAG; Future    3. Obesity, Class I, BMI 30-34.9  I have reviewed/discussed the above normal BMI with the patient. I have recommended the following interventions: dietary management education, guidance, and counseling . [place] 4. Nonintractable headache, unspecified chronicity pattern, unspecified headache type  - advised to RTO if no improvement, we discussed about getting CT of the head, pt agreed to let me know    Advised to hold of on Allegra D, advised to change to regular allegra and flonase    5. Encounter for Medicare annual wellness exam    6. Malaise and fatigue  -     CBC WITH AUTOMATED DIFF; Future  -     TSH 3RD GENERATION; Future  -     T4, FREE; Future  -     URINALYSIS W/MICROSCOPIC; Future      Follow-up and Dispositions    · Return in about 6 months (around 2/28/2021) for chronic follow up. Medication risks/benefits/costs/interactions/alternatives discussed with patient. Advised patient to call back or return to office if symptoms worsen/change/persist.  If patient cannot reach us or should anything more severe/urgent arise he/she should proceed directly to the nearest emergency department. Discussed expected course/resolution/complications of diagnosis in detail with patient. Patient given a written after visit summary which includes her diagnoses, current medications and vitals. Patient expressed understanding with the diagnosis and plan.

## 2020-08-31 ENCOUNTER — HOSPITAL ENCOUNTER (OUTPATIENT)
Dept: LAB | Age: 70
Discharge: HOME | End: 2020-08-31
Payer: MEDICARE

## 2020-08-31 PROCEDURE — 85025 COMPLETE CBC W/AUTO DIFF WBC: CPT

## 2020-08-31 PROCEDURE — 36415 COLL VENOUS BLD VENIPUNCTURE: CPT

## 2020-08-31 PROCEDURE — 80061 LIPID PANEL: CPT

## 2020-08-31 PROCEDURE — 83036 HEMOGLOBIN GLYCOSYLATED A1C: CPT

## 2020-08-31 PROCEDURE — 80053 COMPREHEN METABOLIC PANEL: CPT

## 2020-08-31 PROCEDURE — 84439 ASSAY OF FREE THYROXINE: CPT

## 2020-08-31 PROCEDURE — 81001 URINALYSIS AUTO W/SCOPE: CPT

## 2020-08-31 PROCEDURE — 84443 ASSAY THYROID STIM HORMONE: CPT

## 2020-09-01 LAB
ALBUMIN SERPL-MCNC: 4.5 G/DL (ref 3.8–4.8)
ALBUMIN/GLOB SERPL: 2.1 {RATIO} (ref 1.2–2.2)
ALP SERPL-CCNC: 104 IU/L (ref 39–117)
ALT SERPL-CCNC: 15 IU/L (ref 0–32)
APPEARANCE UR: CLEAR
AST SERPL-CCNC: 17 IU/L (ref 0–40)
BACTERIA #/AREA URNS HPF: NORMAL /[HPF]
BASOPHILS # BLD AUTO: 0.1 X10E3/UL (ref 0–0.2)
BASOPHILS NFR BLD AUTO: 1 %
BILIRUB SERPL-MCNC: 0.2 MG/DL (ref 0–1.2)
BILIRUB UR QL STRIP: NEGATIVE
BUN SERPL-MCNC: 27 MG/DL (ref 8–27)
BUN/CREAT SERPL: 28 (ref 12–28)
CALCIUM SERPL-MCNC: 9.6 MG/DL (ref 8.7–10.3)
CASTS URNS QL MICRO: NORMAL /LPF
CHLORIDE SERPL-SCNC: 107 MMOL/L (ref 96–106)
CHOLEST SERPL-MCNC: 215 MG/DL (ref 100–199)
CO2 SERPL-SCNC: 22 MMOL/L (ref 20–29)
COLOR UR: YELLOW
COMMENT:: ABNORMAL
CREAT SERPL-MCNC: 0.96 MG/DL (ref 0.57–1)
EOSINOPHIL # BLD AUTO: 0.3 X10E3/UL (ref 0–0.4)
EOSINOPHIL NFR BLD AUTO: 4 %
EPI CELLS #/AREA URNS HPF: NORMAL /HPF (ref 0–10)
ERYTHROCYTE [DISTWIDTH] IN BLOOD BY AUTOMATED COUNT: 12.8 % (ref 11.7–15.4)
EST. AVERAGE GLUCOSE BLD GHB EST-MCNC: 117 MG/DL
GLOBULIN SER CALC-MCNC: 2.1 G/DL (ref 1.5–4.5)
GLUCOSE SERPL-MCNC: 101 MG/DL (ref 65–99)
GLUCOSE UR QL: NEGATIVE
HBA1C MFR BLD: 5.7 % (ref 4.8–5.6)
HCT VFR BLD AUTO: 39.6 % (ref 34–46.6)
HDLC SERPL-MCNC: 63 MG/DL
HGB BLD-MCNC: 13.1 G/DL (ref 11.1–15.9)
HGB UR QL STRIP: NEGATIVE
IMM GRANULOCYTES # BLD AUTO: 0.1 X10E3/UL (ref 0–0.1)
IMM GRANULOCYTES NFR BLD AUTO: 1 %
INTERPRETATION, 910389: NORMAL
KETONES UR QL STRIP: NEGATIVE
LDLC SERPL CALC-MCNC: 116 MG/DL (ref 0–99)
LEUKOCYTE ESTERASE UR QL STRIP: ABNORMAL
LYMPHOCYTES # BLD AUTO: 2.3 X10E3/UL (ref 0.7–3.1)
LYMPHOCYTES NFR BLD AUTO: 26 %
MCH RBC QN AUTO: 28.9 PG (ref 26.6–33)
MCHC RBC AUTO-ENTMCNC: 33.1 G/DL (ref 31.5–35.7)
MCV RBC AUTO: 87 FL (ref 79–97)
MICRO URNS: ABNORMAL
MONOCYTES # BLD AUTO: 1 X10E3/UL (ref 0.1–0.9)
MONOCYTES NFR BLD AUTO: 11 %
NEUTROPHILS # BLD AUTO: 5.2 X10E3/UL (ref 1.4–7)
NEUTROPHILS NFR BLD AUTO: 57 %
NITRITE UR QL STRIP: NEGATIVE
PH UR STRIP: 5.5 [PH] (ref 5–7.5)
PLATELET # BLD AUTO: 272 X10E3/UL (ref 150–450)
POTASSIUM SERPL-SCNC: 5.3 MMOL/L (ref 3.5–5.2)
PROT SERPL-MCNC: 6.6 G/DL (ref 6–8.5)
PROT UR QL STRIP: NEGATIVE
RBC # BLD AUTO: 4.54 X10E6/UL (ref 3.77–5.28)
RBC #/AREA URNS HPF: NORMAL /HPF (ref 0–2)
SODIUM SERPL-SCNC: 142 MMOL/L (ref 134–144)
SP GR UR: 1.02 (ref 1–1.03)
T4 FREE SERPL-MCNC: 0.98 NG/DL (ref 0.82–1.77)
TRIGL SERPL-MCNC: 208 MG/DL (ref 0–149)
TSH SERPL DL<=0.005 MIU/L-ACNC: 3.14 UIU/ML (ref 0.45–4.5)
UROBILINOGEN UR STRIP-MCNC: 0.2 MG/DL (ref 0.2–1)
VLDLC SERPL CALC-MCNC: 36 MG/DL (ref 5–40)
WBC # BLD AUTO: 9 X10E3/UL (ref 3.4–10.8)
WBC #/AREA URNS HPF: NORMAL /HPF (ref 0–5)

## 2020-09-25 ENCOUNTER — TELEPHONE (OUTPATIENT)
Dept: FAMILY MEDICINE CLINIC | Age: 70
End: 2020-09-25

## 2020-09-25 NOTE — TELEPHONE ENCOUNTER
----- Message from Narinder Nichols sent at 9/25/2020 12:02 PM EDT -----  Regarding: Dr. Michaela Ovalles  Appointment not available    Caller's first and last name and relationship to patient (if not the patient):      Best contact number:[phone]      Preferred date and time: 9/28 or 9/29 anytime       Scheduled appointment date and time:none       Reason for appointment:bit by a tic diagnosed 9/18 with lime disease requires f/up and retest by 10/5       Details to clarify the request:      Narinder Nichols

## 2020-09-25 NOTE — TELEPHONE ENCOUNTER
Dr. Williamson Finger patient had tick bite with that got worse. Required her to go to hospital. IGM was positive, IGG was negative. Was started on Doxy and Cephalexin they advised her to follow up in 4 to 6 weeks to repeat labs. This happened when she was out of town. Her question is can she just get a lab slip or do you want to see her in the clinic. Please advise.

## 2020-09-28 ENCOUNTER — OFFICE VISIT (OUTPATIENT)
Dept: FAMILY MEDICINE CLINIC | Age: 70
End: 2020-09-28
Payer: MEDICARE

## 2020-09-28 VITALS
OXYGEN SATURATION: 98 % | HEIGHT: 62 IN | BODY MASS INDEX: 34.78 KG/M2 | WEIGHT: 189 LBS | HEART RATE: 97 BPM | TEMPERATURE: 98.1 F | RESPIRATION RATE: 16 BRPM | SYSTOLIC BLOOD PRESSURE: 131 MMHG | DIASTOLIC BLOOD PRESSURE: 78 MMHG
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
## 2020-09-28 DIAGNOSIS — S30.860A TICK BITE OF BACK, INITIAL ENCOUNTER: Primary | ICD-10-CM

## 2020-09-28 PROCEDURE — 99213 OFFICE O/P EST LOW 20 MIN: CPT | Performed by: FAMILY MEDICINE

## 2020-09-28 PROCEDURE — G8399 PT W/DXA RESULTS DOCUMENT: HCPCS | Performed by: FAMILY MEDICINE

## 2020-09-28 PROCEDURE — 1101F PT FALLS ASSESS-DOCD LE1/YR: CPT | Performed by: FAMILY MEDICINE

## 2020-09-28 PROCEDURE — G0463 HOSPITAL OUTPT CLINIC VISIT: HCPCS | Performed by: FAMILY MEDICINE

## 2020-09-28 PROCEDURE — G8427 DOCREV CUR MEDS BY ELIG CLIN: HCPCS | Performed by: FAMILY MEDICINE

## 2020-09-28 PROCEDURE — G9717 DOC PT DX DEP/BP F/U NT REQ: HCPCS | Performed by: FAMILY MEDICINE

## 2020-09-28 PROCEDURE — G9899 SCRN MAM PERF RSLTS DOC: HCPCS | Performed by: FAMILY MEDICINE

## 2020-09-28 PROCEDURE — G8417 CALC BMI ABV UP PARAM F/U: HCPCS | Performed by: FAMILY MEDICINE

## 2020-09-28 PROCEDURE — 1090F PRES/ABSN URINE INCON ASSESS: CPT | Performed by: FAMILY MEDICINE

## 2020-09-28 PROCEDURE — 3017F COLORECTAL CA SCREEN DOC REV: CPT | Performed by: FAMILY MEDICINE

## 2020-09-28 PROCEDURE — G8536 NO DOC ELDER MAL SCRN: HCPCS | Performed by: FAMILY MEDICINE

## 2020-09-28 RX ORDER — DOXYCYCLINE 100 MG/1
100 TABLET ORAL 2 TIMES DAILY
Qty: 30 TAB | Refills: 0 | Status: SHIPPED | OUTPATIENT
Start: 2020-09-28 | End: 2020-10-13

## 2020-09-28 RX ORDER — DOXYCYCLINE 100 MG/1
100 TABLET ORAL 2 TIMES DAILY
COMMUNITY
End: 2020-09-28 | Stop reason: SDUPTHER

## 2020-09-28 RX ORDER — CEPHALEXIN 250 MG/1
500 CAPSULE ORAL 4 TIMES DAILY
COMMUNITY
End: 2020-11-13

## 2020-09-28 NOTE — PROGRESS NOTES
HPI  Nicho Akhtar 81 y.o. female  presents to the office today     Blood pressure 131/78, pulse 97, temperature 98.1 °F (36.7 °C), temperature source Temporal, resp. rate 16, height 5' 2\" (1.575 m), weight 189 lb (85.7 kg), SpO2 98 %. Body mass index is 34.57 kg/m². Chief Complaint   Patient presents with    Tick Removal        Pt presents today with a healing tick bite on her back; she informs me that at the bite's largest, it was17 cm in diameter. Pt was bit on the 7th or 8th of September; on the 11th, pt went to a local [facility] for care. She informs me that three medical professionals looked at the bite and advised her to [facility] an eye on it\" as they were unsure if it was a tick bite or a spider bite. A few days later, the pt's friend, a nurse, looked at the bite and then took the pt back to [facility] as the bite was increasing in size and pain severity. At [facility], pt was placed on doxycycline for two weeks. Pt then began to develop so much pain over the next few days that she was administered to the hospital. While there, no tick was spotted or removed. Pt was tested for Lyme disease, and the test came back positive. Pt wants to know if she has chronic or acute Lyme disease. I will provide pt with labwork to assess which tick-borne illness she may have; pt has been experiencing back pain, joint pain, muscle pain, and fatigue since suffering from the bite. I have also provided pt with another week of antibiotics to finish her dosage. I advised her to avoid calcium, vitamin D, and magnesium supplementation while taking the antibiotics. Current Outpatient Medications   Medication Sig Dispense Refill    cephALEXin (KEFLEX) 250 mg capsule Take 500 mg by mouth four (4) times daily.  doxycycline (ADOXA) 100 mg tablet Take 1 Tab by mouth two (2) times a day for 15 days.  30 Tab 0    atorvastatin (LIPITOR) 20 mg tablet TAKE 1 TABLET BY MOUTH EVERY DAY 90 Tab 1    meloxicam (MOBIC) 15 mg tablet Take 1 tab by mouth every day for Osteoarthritis 90 Tab 1    estradioL (ESTRACE) 0.01 % (0.1 mg/gram) vaginal cream INSERT 1 GRAM VAGINALLY TWICE WEEKLY      albuterol (PROVENTIL HFA, VENTOLIN HFA, PROAIR HFA) 90 mcg/actuation inhaler Take 2 Puffs by inhalation every six (6) hours as needed for Wheezing. 1 Inhaler 0    magnesium oxide (MAG-OX) 400 mg tablet Take 400 mg by mouth daily.  omega-3 fatty acids-vitamin e 1,000 mg cap Take 2 Caps by mouth.  escitalopram oxalate (LEXAPRO) 20 mg tablet Take 1 Tab by mouth daily. Indications: MAJOR DEPRESSIVE DISORDER (Patient taking differently: Take 10 mg by mouth daily. Indications: major depressive disorder) 30 Tab 0    busPIRone (BUSPAR) 10 mg tablet Take 1 Tab by mouth two (2) times a day. Indications: GENERALIZED ANXIETY DISORDER 60 Tab 1    CALCIUM CARBONATE/VITAMIN D3 (CALCIUM + D PO) Take 1 Tab by mouth daily. Indications: SUPPLEMENT      FEXOFENADINE/PSEUDOEPHEDRINE (ALLEGRA-D 12 HOUR PO) Take 1 Tab by mouth daily as needed (Allergies). Indications: SEASONAL ALLERGIES      MULTIVITAMINS (MULTIPLE VITAMIN PO) Take 1 Tab by mouth daily. Indications: VITAMIN DEFICIENCY PREVENTION       No Known Allergies  Past Medical History:   Diagnosis Date    Anxiety     AR (allergic rhinitis)     Depression     Spondylosis      Past Surgical History:   Procedure Laterality Date    HX HYSTERECTOMY       Family History   Problem Relation Age of Onset    Cancer Mother 80        lyphoma    Lung Disease Father     Heart Disease Father 80        a fib    Other Brother 40        Amlydosis     Social History     Tobacco Use    Smoking status: Former Smoker     Years: 30.00     Last attempt to quit: 2003     Years since quittin.0    Smokeless tobacco: Never Used   Substance Use Topics    Alcohol use:  Yes     Alcohol/week: 0.8 standard drinks     Types: 1 Glasses of wine per week     Comment: ocassional        Review of Systems   Constitutional: Negative for chills and fever. HENT: Negative for hearing loss and tinnitus. Eyes: Negative for blurred vision and double vision. Respiratory: Negative for cough and shortness of breath. Cardiovascular: Negative for chest pain and palpitations. Gastrointestinal: Negative for nausea and vomiting. Genitourinary: Negative for dysuria and frequency. Musculoskeletal: Negative for back pain and falls. Skin: Negative for itching and rash. Neurological: Negative for dizziness, loss of consciousness and headaches. Endo/Heme/Allergies: Negative. Psychiatric/Behavioral: Negative for depression. The patient is not nervous/anxious. Physical Exam  Constitutional:       Appearance: Normal appearance. HENT:      Head: Normocephalic and atraumatic. Right Ear: Tympanic membrane, ear canal and external ear normal.      Left Ear: Tympanic membrane, ear canal and external ear normal.      Nose: Nose normal.      Mouth/Throat:      Mouth: Mucous membranes are moist.      Pharynx: Oropharynx is clear. Eyes:      Extraocular Movements: Extraocular movements intact. Conjunctiva/sclera: Conjunctivae normal.      Pupils: Pupils are equal, round, and reactive to light. Cardiovascular:      Rate and Rhythm: Normal rate and regular rhythm. Pulses: Normal pulses. Heart sounds: Normal heart sounds. Pulmonary:      Effort: Pulmonary effort is normal.      Breath sounds: Normal breath sounds. Abdominal:      General: Abdomen is flat. Bowel sounds are normal.      Palpations: Abdomen is soft. Genitourinary:     General: Normal vulva. Rectum: Normal.   Musculoskeletal: Normal range of motion. Skin:     General: Skin is warm and dry. Neurological:      General: No focal deficit present. Mental Status: She is alert and oriented to person, place, and time. Mental status is at baseline.    Psychiatric:         Mood and Affect: Mood normal.         Behavior: Behavior normal.         Judgment: Judgment normal.           ASSESSMENT and PLAN  Diagnoses and all orders for this visit:    1. Tick bite of back, initial encounter  Pt presents today with a healing tick bite on her back; she informs me that at the bite's largest, it was 17 cm in diameter. Pt was bit on the 7th or 8th of September; on the 11th, [facility]. At [facility], pt was placed on doxycycline for two weeks. Pt then began to develop so much pain over the next few days that she was administered to the hospital. While there, no tick was spotted or removed. Pt was tested for Lyme disease, and the test came back positive. Pt wants to know if she has chronic or acute Lyme disease. I will provide pt with labwork to assess which tick-borne illness she may have; pt has been experiencing back pain, joint pain, muscle pain, and fatigue since suffering from the bite. -     doxycycline (ADOXA) 100 mg tablet; Take 1 Tab by mouth two (2) times a day for 15 days.  -     LYME AB, IGG & IGM BY WB  -     R RICKETTSII AB IGG W/REFL  -     R RICKETTSII IGM                 Medication risks/benefits/costs/interactions/alternatives discussed with patient. Advised patient to call back or return to office if symptoms worsen/change/persist.  If patient cannot reach us or should anything more severe/urgent arise he/she should proceed directly to the nearest emergency department. Discussed expected course/resolution/complications of diagnosis in detail with patient. Patient given a written after visit summary which includes diagnoses, current medications and vitals. Patient expressed understanding with the diagnosis and plan. Written by khari Robison, as dictated by Patti Kate M.D.    4:28 PM - 4:39 PM    Total time spent with the patient 11 minutes, greater than 50% of time spent counseling patient.

## 2020-09-28 NOTE — PROGRESS NOTES
No chief complaint on file. 1. Have you been to the ER, urgent care clinic since your last visit? Hospitalized [facility] [place] [facility]     2. Have you seen or consulted any other health care providers outside of the [address] [place] [address] since your last visit? Include any pap smears or colon screening.   DERM Dr. Abimbola Cornejo

## 2020-09-28 NOTE — TELEPHONE ENCOUNTER
Call placed to patient name and  verified. Scheduled patient for appt for follow up on on tick bite.

## 2020-09-29 ENCOUNTER — HOSPITAL ENCOUNTER (OUTPATIENT)
Dept: LAB | Age: 70
Discharge: HOME | End: 2020-09-29

## 2020-09-29 LAB
COMMENT, HOLDF: NORMAL
SAMPLES BEING HELD,HOLD: NORMAL

## 2020-10-02 LAB — R RICKETTSI IGM SER-ACNC: 0.67 INDEX (ref 0–0.89)

## 2020-10-09 ENCOUNTER — TELEPHONE (OUTPATIENT)
Dept: FAMILY MEDICINE CLINIC | Age: 70
End: 2020-10-09

## 2020-10-09 NOTE — TELEPHONE ENCOUNTER
TC to Lab [facility] they advise only IGM was ordered/ done no other labs ordered. Cannot add on blood is too old.

## 2020-10-09 NOTE — TELEPHONE ENCOUNTER
Pt called to follow up on her labs. She needs to know if she has to be redrawn or if ok then wants a nurse to call her with the results.     BCB: [phone]

## 2020-10-09 NOTE — TELEPHONE ENCOUNTER
TC to Patient. ID verified. Advised not all labs were drawn for Sallie and Lyme. Patient will return to [facility] lab to have blood re-drawn.

## 2020-10-09 NOTE — TELEPHONE ENCOUNTER
----- Message from Marcellus Cuenca MD sent at 10/4/2020  7:17 PM EDT -----  Dai Reyes please inquire  What happed to the rest of her labs

## 2020-10-17 LAB
B BURGDOR IGG PATRN SER IB-IMP: NEGATIVE
B BURGDOR IGM PATRN SER IB-IMP: POSITIVE
B BURGDOR18KD IGG SER QL IB: ABNORMAL
B BURGDOR23KD IGG SER QL IB: PRESENT
B BURGDOR23KD IGM SER QL IB: PRESENT
B BURGDOR28KD IGG SER QL IB: ABNORMAL
B BURGDOR30KD IGG SER QL IB: ABNORMAL
B BURGDOR39KD IGG SER QL IB: ABNORMAL
B BURGDOR39KD IGM SER QL IB: ABNORMAL
B BURGDOR41KD IGG SER QL IB: PRESENT
B BURGDOR41KD IGM SER QL IB: PRESENT
B BURGDOR45KD IGG SER QL IB: ABNORMAL
B BURGDOR58KD IGG SER QL IB: ABNORMAL
B BURGDOR66KD IGG SER QL IB: ABNORMAL
B BURGDOR93KD IGG SER QL IB: PRESENT
R RICKETTSI IGG SER QL IA: NEGATIVE
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
## 2020-10-18 RX ORDER — DOXYCYCLINE 100 MG/1
100 TABLET ORAL 2 TIMES DAILY
Qty: 28 TAB | Refills: 0 | Status: SHIPPED | OUTPATIENT
Start: 2020-10-18 | End: 2020-11-01

## 2020-10-18 NOTE — PROGRESS NOTES
Pt has acute lyme titer  Pt needs to be on doxycycline for 2 more weeks #14  Any questions let me know

## 2020-10-20 ENCOUNTER — TELEPHONE (OUTPATIENT)
Dept: FAMILY MEDICINE CLINIC | Age: 70
End: 2020-10-20

## 2020-10-20 NOTE — TELEPHONE ENCOUNTER
----- Message from [place] sent at 10/20/2020 10:50 AM EDT -----  Regarding: Dr. Yamila Lepe  General Message/Vendor Calls    Caller's first and last name: Iris Chiu      Reason for call: Requesting to speak with provider regarding recent limes positive test result. Would like to discuss it further to get better understanding of test result.        Callback required yes/no and why: yes      Best contact number(s): [phone]      Details to clarify the request: [facility]

## 2020-10-21 ENCOUNTER — TELEPHONE (OUTPATIENT)
Dept: FAMILY MEDICINE CLINIC | Age: 70
End: 2020-10-21

## 2020-10-21 NOTE — TELEPHONE ENCOUNTER
Patient advised lyme was positive needs to continue treatment for 14 days    Patient wants to know is there anything long term she should be concerned about ???    Discussed lab for Lyme  main remain positive for several months. Discussed usual sx are flu like sx and fatigue. Sx should resolve with treatment gradually. Patient wants to know if there is anything else she needs to know.

## 2020-10-21 NOTE — TELEPHONE ENCOUNTER
----- Message from [address] [place] [address]. Barbarajeyson sent at 10/16/2020  7:19 PM EDT -----  Regarding: RE: Test Results Question  Contact: [phone]  I had the labs redrawn Monday Oct. 12th. Would you please check and see what you can find out about the results. This is getting to be a little annoying.   Thank you,  Holly Paz

## 2020-10-22 NOTE — TELEPHONE ENCOUNTER
----- Message from Shannon Schmidt MD sent at 10/18/2020  5:54 PM EDT -----  Pt has acute lyme titer  Pt needs to be on doxycycline for 2 more weeks #14  Any questions let me know

## 2020-10-22 NOTE — TELEPHONE ENCOUNTER
TC to Patient ID verified. Calling to advise Lyme T[place] is positive advised needs to be on Doxy 14 days more. Patient verbalizes understanding.

## 2020-11-13 ENCOUNTER — APPOINTMENT (OUTPATIENT)
Dept: CT IMAGING | Age: 70
End: 2020-11-13
Attending: EMERGENCY MEDICINE
Payer: MEDICARE

## 2020-11-13 ENCOUNTER — HOSPITAL ENCOUNTER (EMERGENCY)
Age: 70
Discharge: HOME | End: 2020-11-13
Attending: EMERGENCY MEDICINE
Payer: MEDICARE

## 2020-11-13 VITALS
WEIGHT: 176 LBS | HEIGHT: 62 IN | RESPIRATION RATE: 16 BRPM | DIASTOLIC BLOOD PRESSURE: 76 MMHG | BODY MASS INDEX: 32.39 KG/M2 | SYSTOLIC BLOOD PRESSURE: 127 MMHG | TEMPERATURE: 97.6 F | HEART RATE: 86 BPM | OXYGEN SATURATION: 100 %

## 2020-11-13 DIAGNOSIS — R91.1 RIGHT LOWER LOBE PULMONARY NODULE: ICD-10-CM

## 2020-11-13 DIAGNOSIS — R10.9 RIGHT SIDED ABDOMINAL PAIN: Primary | ICD-10-CM

## 2020-11-13 DIAGNOSIS — K57.90 DIVERTICULOSIS: ICD-10-CM

## 2020-11-13 DIAGNOSIS — N17.9 AKI (ACUTE KIDNEY INJURY) (HCC): ICD-10-CM

## 2020-11-13 LAB
ALBUMIN SERPL-MCNC: 4.1 G/DL (ref 3.5–5)
ALBUMIN/GLOB SERPL: 1 {RATIO} (ref 1.1–2.2)
ALP SERPL-CCNC: 104 U/L (ref 45–117)
ALT SERPL-CCNC: 29 U/L (ref 12–78)
ANION GAP SERPL CALC-SCNC: 12 MMOL/L (ref 5–15)
AST SERPL-CCNC: 18 U/L (ref 15–37)
BASOPHILS # BLD: 0.1 K/UL (ref 0–0.1)
BASOPHILS NFR BLD: 0 % (ref 0–1)
BILIRUB SERPL-MCNC: 0.5 MG/DL (ref 0.2–1)
BUN SERPL-MCNC: 33 MG/DL (ref 6–20)
BUN/CREAT SERPL: 22 (ref 12–20)
CALCIUM SERPL-MCNC: 9.9 MG/DL (ref 8.5–10.1)
CHLORIDE SERPL-SCNC: 102 MMOL/L (ref 97–108)
CO2 SERPL-SCNC: 25 MMOL/L (ref 21–32)
CREAT SERPL-MCNC: 1.49 MG/DL (ref 0.55–1.02)
DIFFERENTIAL METHOD BLD: ABNORMAL
EOSINOPHIL # BLD: 0.2 K/UL (ref 0–0.4)
EOSINOPHIL NFR BLD: 1 % (ref 0–7)
ERYTHROCYTE [DISTWIDTH] IN BLOOD BY AUTOMATED COUNT: 12.7 % (ref 11.5–14.5)
GLOBULIN SER CALC-MCNC: 4 G/DL (ref 2–4)
GLUCOSE SERPL-MCNC: 110 MG/DL (ref 65–100)
HCT VFR BLD AUTO: 42.5 % (ref 35–47)
HGB BLD-MCNC: 14 G/DL (ref 11.5–16)
IMM GRANULOCYTES # BLD AUTO: 0.1 K/UL (ref 0–0.04)
IMM GRANULOCYTES NFR BLD AUTO: 1 % (ref 0–0.5)
LACTATE SERPL-SCNC: 0.8 MMOL/L (ref 0.4–2)
LIPASE SERPL-CCNC: 125 U/L (ref 73–393)
LYMPHOCYTES # BLD: 1.6 K/UL (ref 0.8–3.5)
LYMPHOCYTES NFR BLD: 11 % (ref 12–49)
MCH RBC QN AUTO: 28.9 PG (ref 26–34)
MCHC RBC AUTO-ENTMCNC: 32.9 G/DL (ref 30–36.5)
MCV RBC AUTO: 87.8 FL (ref 80–99)
MONOCYTES # BLD: 1.3 K/UL (ref 0–1)
MONOCYTES NFR BLD: 9 % (ref 5–13)
NEUTS SEG # BLD: 10.9 K/UL (ref 1.8–8)
NEUTS SEG NFR BLD: 78 % (ref 32–75)
NRBC # BLD: 0 K/UL (ref 0–0.01)
NRBC BLD-RTO: 0 PER 100 WBC
PLATELET # BLD AUTO: 321 K/UL (ref 150–400)
PMV BLD AUTO: 11.3 FL (ref 8.9–12.9)
POTASSIUM SERPL-SCNC: 4.2 MMOL/L (ref 3.5–5.1)
PROT SERPL-MCNC: 8.1 G/DL (ref 6.4–8.2)
RBC # BLD AUTO: 4.84 M/UL (ref 3.8–5.2)
SODIUM SERPL-SCNC: 139 MMOL/L (ref 136–145)
WBC # BLD AUTO: 14.2 K/UL (ref 3.6–11)

## 2020-11-13 PROCEDURE — 74011250637 HC RX REV CODE- 250/637: Performed by: EMERGENCY MEDICINE

## 2020-11-13 PROCEDURE — 36415 COLL VENOUS BLD VENIPUNCTURE: CPT

## 2020-11-13 PROCEDURE — 74011000258 HC RX REV CODE- 258: Performed by: EMERGENCY MEDICINE

## 2020-11-13 PROCEDURE — 99283 EMERGENCY DEPT VISIT LOW MDM: CPT

## 2020-11-13 PROCEDURE — 80053 COMPREHEN METABOLIC PANEL: CPT

## 2020-11-13 PROCEDURE — 83690 ASSAY OF LIPASE: CPT

## 2020-11-13 PROCEDURE — 93005 ELECTROCARDIOGRAM TRACING: CPT

## 2020-11-13 PROCEDURE — 74176 CT ABD & PELVIS W/O CONTRAST: CPT

## 2020-11-13 PROCEDURE — 96366 THER/PROPH/DIAG IV INF ADDON: CPT

## 2020-11-13 PROCEDURE — 85025 COMPLETE CBC W/AUTO DIFF WBC: CPT

## 2020-11-13 PROCEDURE — 74011250636 HC RX REV CODE- 250/636: Performed by: EMERGENCY MEDICINE

## 2020-11-13 PROCEDURE — 96365 THER/PROPH/DIAG IV INF INIT: CPT

## 2020-11-13 PROCEDURE — 83605 ASSAY OF LACTIC ACID: CPT

## 2020-11-13 RX ORDER — ONDANSETRON 4 MG/1
4 TABLET, ORALLY DISINTEGRATING ORAL
Qty: 10 TAB | Refills: 0 | Status: SHIPPED | OUTPATIENT
Start: 2020-11-13

## 2020-11-13 RX ORDER — SODIUM CHLORIDE 9 MG/ML
50 INJECTION, SOLUTION INTRAVENOUS
Status: DISCONTINUED | OUTPATIENT
Start: 2020-11-13 | End: 2020-11-13

## 2020-11-13 RX ORDER — LORAZEPAM 1 MG/1
1 TABLET ORAL
Status: COMPLETED | OUTPATIENT
Start: 2020-11-13 | End: 2020-11-13

## 2020-11-13 RX ORDER — SODIUM CHLORIDE 9 MG/ML
10 INJECTION INTRAMUSCULAR; INTRAVENOUS; SUBCUTANEOUS ONCE
Status: DISCONTINUED | OUTPATIENT
Start: 2020-11-13 | End: 2020-11-13

## 2020-11-13 RX ORDER — DIPHENHYDRAMINE HCL 25 MG
25 CAPSULE ORAL
Status: DISCONTINUED | OUTPATIENT
Start: 2020-11-13 | End: 2020-11-13 | Stop reason: HOSPADM

## 2020-11-13 RX ADMIN — LORAZEPAM 1 MG: 1 TABLET ORAL at 16:37

## 2020-11-13 RX ADMIN — SODIUM CHLORIDE 1000 ML: 900 INJECTION, SOLUTION INTRAVENOUS at 15:15

## 2020-11-13 RX ADMIN — PROMETHAZINE HYDROCHLORIDE 25 MG: 25 INJECTION INTRAMUSCULAR; INTRAVENOUS at 15:15

## 2020-11-13 NOTE — ED PROVIDER NOTES
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 She first noticed the abdominal cramping about a week ago, but it was not as bad as it was last night. She was unable to sleep because of the pain. She was finally able to go to sleep around 7 AM.  She said she had a liquid stool prior to that with some mucus, but there was no blood in it and was not dark-colored. No vomiting. No fever. No chest pain or trouble breathing. She has had a tubal ligation, but no other surgeries in the abdomen or pelvis. No urinary symptoms. She is fairly certain this is not C. difficile colitis. Past Medical History:   Diagnosis Date    Anxiety     AR (allergic rhinitis)     Depression     Lyme disease 2020    treated with doxy    Spondylosis        Past Surgical History:   Procedure Laterality Date    HX HYSTERECTOMY           Family History:   Problem Relation Age of Onset    Cancer Mother 80        lyphoma    Lung Disease Father     Heart Disease Father 80        a fib    Other Brother 40        Amlydosis       Social History     Socioeconomic History    Marital status:      Spouse name: Not on file    Number of children: Not on file    Years of education: Not on file    Highest education level: Not on file   Occupational History    Not on file   Social Needs    Financial resource strain: Not on file    Food insecurity     Worry: Not on file     Inability: Not on file    Transportation needs     Medical: Not on file     Non-medical: Not on file   Tobacco Use    Smoking status: Former Smoker     Years: 30.00     Last attempt to quit: 2003     Years since quittin.1    Smokeless tobacco: Never Used   Substance and Sexual Activity    Alcohol use:  Yes     Alcohol/week: 0.8 standard drinks Types: 1 Glasses of wine per week     Comment: ocassional    Drug use: No    Sexual activity: Yes     Partners: Male   Lifestyle    Physical activity     Days per week: Not on file     Minutes per session: Not on file    Stress: Not on file   Relationships    Social connections     Talks on phone: Not on file     Gets together: Not on file     Attends Cheondoism service: Not on file     Active member of club or organization: Not on file     Attends meetings of clubs or organizations: Not on file     Relationship status: Not on file    Intimate partner violence     Fear of current or ex partner: Not on file     Emotionally abused: Not on file     Physically abused: Not on file     Forced sexual activity: Not on file   Other Topics Concern    Not on file   Social History Narrative    63year old   female voluntarily admitted after declaring intent to commit suicide by injecting an insulin overdose. Pt's trigger is the anniversary of her son's suicide in prison 3 years ago. Pt. Had son arrested for stealing her jewelry to sell for drug money. Pt. 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 She sees DR Durga Mendoza once every three months for psychiatric med mnagement. She has been resistant to any medication changes proposed. She has been on Well butrin 150 mg po tid for \"years\". Pt. Sees a therapist in [facility]". She does not participate in therapy, grif groups, or perez groups on any regular basis. ALLERGIES: Patient has no known allergies. Review of Systems   Constitutional: Negative for fever. HENT: Negative for trouble swallowing. Eyes: Negative for visual disturbance. Respiratory: Negative for cough. Cardiovascular: Negative for chest pain. Gastrointestinal: Positive for abdominal pain, constipation and diarrhea. Genitourinary: Negative for difficulty urinating. Musculoskeletal: Negative for gait problem. Skin: Negative for rash.    Neurological: Negative for headaches. Hematological: Does not bruise/bleed easily. Psychiatric/Behavioral: Negative for sleep disturbance. Vitals:    11/13/20 1440   BP: 127/76   Pulse: 86   Resp: 16   Temp: 97.6 °F (36.4 °C)   SpO2: 100%   Weight: 79.8 kg (176 lb)   Height: 5' 2\" (1.575 m)            Physical Exam  Constitutional:       Appearance: Normal appearance. HENT:      Head: Normocephalic. Nose: Nose normal.      Mouth/Throat:      Mouth: Mucous membranes are moist.   Eyes:      Extraocular Movements: Extraocular movements intact. Conjunctiva/sclera: Conjunctivae normal.   Cardiovascular:      Rate and Rhythm: Normal rate. Pulmonary:      Effort: Pulmonary effort is normal. No respiratory distress. Abdominal:      General: Abdomen is flat. Tenderness: There is abdominal tenderness in the right upper quadrant and right lower quadrant. There is rebound. There is no guarding. Musculoskeletal: Normal range of motion. Skin:     Findings: No rash. Neurological:      General: No focal deficit present. Mental Status: She is alert. Psychiatric:         Behavior: Behavior normal.          MDM  Number of Diagnoses or Management Options  SIDDHARTHA (acute kidney injury) [facility]):   Diverticulosis:   Right lower lobe pulmonary nodule:   Right sided abdominal pain:   Diagnosis management comments: EKG at 1515  Normal sinus with normal axis at a rate of 79  MO, QRS, and QTc all within normal limits  No ST changes    I had a long talk with the radiologist about using IV contrast with the study or not and he felt comfortable reading the scan without IV contrast.  Her kidney function has dipped from just a few months ago where her creatinine was 0.96 and is now 1.49. The scan did not reveal a cause for her abdominal pain and it did show unchanged pulmonary nodules, which I discussed with the patient.   Her legs have gotten quite restless since she got Phenergan earlier, so I gave her some Ativan and ongoing to give her some Benadryl now. She plans to follow-up with her doctor and have her kidney function rechecked. I gave her some fluids here today to treat this acute kidney injury and she understands to stay adequately hydrated. I wrote her prescription for Zofran. I told her to come back should she feel worse in case there was something we missed here today.          Procedures

## 2020-11-13 NOTE — ED TRIAGE NOTES
RLQ pain that started approx 1 week ago but that has gotten worse over the last 24 hours. + \"cramping\" type of pain. + nausea.

## 2020-11-14 LAB
ATRIAL RATE: 79 BPM
CALCULATED P AXIS, ECG09: 63 DEGREES
CALCULATED R AXIS, ECG10: 35 DEGREES
CALCULATED T AXIS, ECG11: 49 DEGREES
DIAGNOSIS, 93000: NORMAL
P-R INTERVAL, ECG05: 150 MS
Q-T INTERVAL, ECG07: 366 MS
QRS DURATION, ECG06: 76 MS
QTC CALCULATION (BEZET), ECG08: 419 MS
VENTRICULAR RATE, ECG03: 79 BPM

## 2020-11-18 ENCOUNTER — PATIENT MESSAGE (OUTPATIENT)
Dept: FAMILY MEDICINE CLINIC | Age: 70
End: 2020-11-18

## 2020-11-18 ENCOUNTER — TELEPHONE (OUTPATIENT)
Dept: FAMILY MEDICINE CLINIC | Age: 70
End: 2020-11-18

## 2020-11-18 DIAGNOSIS — N28.9 ABNORMAL KIDNEY FUNCTION: Primary | ICD-10-CM

## 2020-11-18 NOTE — LETTER
1/27/2021 2:40 PM 
 
Ms. Anushka Blanc [address] My chart Message From Kamryn Mckeon LPN To  
Sher Cagle and Delivered 11/18/2020  1:38 PM  
Your message has been sent to DR. Ca. I would focus on staying well hydrated and I will see when he would like to see you and re-check labs. Sincerely, [facility] Provider

## 2020-11-18 NOTE — TELEPHONE ENCOUNTER
----- Message from [place] sent at 11/18/2020  9:24 AM EST -----  Regarding: Dr. Supriya Martinez  General Message/Vendor Calls    Caller's first and last name:Vanessa Cardoza      Reason for call: Pt was seen in the ER at [facility] on 11/13/20 for abdominal pain. Labs were done and shows Creatine at 1.4/1.5. Pt still having some discomfort and would like a call back to find out if labs should be done again. Lab reports from [facility] should be on file for pt for provider to review.        Callback required yes/no and why: yes      Best contact number(s):[phone]      Details to clarify the request: [facility]

## 2020-11-20 NOTE — TELEPHONE ENCOUNTER
TC to Patient. LMOVM advised Dr. Raul Hawthorne wants her to [facility] in 1 week for lab. Be well hydrated. Order has been placed. Advised to call for lab appointment.

## 2020-11-24 NOTE — TELEPHONE ENCOUNTER
TC to Patient. ID verified. Calling to advise we need to re-check lab this week scheduled for lab orders entered per DR. Ca verbal order. . Also scheduled for follow up appointment.

## 2020-11-24 NOTE — TELEPHONE ENCOUNTER
----- Message from [place] sent at 11/20/2020 12:57 PM EST -----  Regarding: Dr. Meenakshi Lobo  General Message/Vendor Calls    Caller's first and last name: Racquel Gordon      Reason for call: Pt had called earlier in the week and left detailed message regarding ER visit and concerns regarding Creatine levels and if they should be rechecked. Pt is having pain in R flank. Requesting a call back to discuss how pt should proceed. Callback required yes/no and why: yes      Best contact number(s):[phone]      Details to clarify the request: 2nd request for a call back.        [place]

## 2020-11-27 ENCOUNTER — LAB ONLY (OUTPATIENT)
Dept: FAMILY MEDICINE CLINIC | Age: 70
End: 2020-11-27

## 2020-11-27 DIAGNOSIS — N28.9 ABNORMAL KIDNEY FUNCTION: ICD-10-CM

## 2020-11-27 LAB
ALBUMIN SERPL-MCNC: 3.8 G/DL (ref 3.5–5)
ALBUMIN/GLOB SERPL: 1.3 {RATIO} (ref 1.1–2.2)
ALP SERPL-CCNC: 96 U/L (ref 45–117)
ALT SERPL-CCNC: 30 U/L (ref 12–78)
ANION GAP SERPL CALC-SCNC: 7 MMOL/L (ref 5–15)
AST SERPL-CCNC: 22 U/L (ref 15–37)
BILIRUB SERPL-MCNC: 0.5 MG/DL (ref 0.2–1)
BUN SERPL-MCNC: 24 MG/DL (ref 6–20)
BUN/CREAT SERPL: 20 (ref 12–20)
CALCIUM SERPL-MCNC: 9.4 MG/DL (ref 8.5–10.1)
CHLORIDE SERPL-SCNC: 108 MMOL/L (ref 97–108)
CO2 SERPL-SCNC: 27 MMOL/L (ref 21–32)
CREAT SERPL-MCNC: 1.19 MG/DL (ref 0.55–1.02)
ERYTHROCYTE [DISTWIDTH] IN BLOOD BY AUTOMATED COUNT: 13.1 % (ref 11.5–14.5)
GLOBULIN SER CALC-MCNC: 3 G/DL (ref 2–4)
GLUCOSE SERPL-MCNC: 89 MG/DL (ref 65–100)
HCT VFR BLD AUTO: 37.3 % (ref 35–47)
HGB BLD-MCNC: 11.9 G/DL (ref 11.5–16)
MCH RBC QN AUTO: 28.7 PG (ref 26–34)
MCHC RBC AUTO-ENTMCNC: 31.9 G/DL (ref 30–36.5)
MCV RBC AUTO: 90.1 FL (ref 80–99)
NRBC # BLD: 0 K/UL (ref 0–0.01)
NRBC BLD-RTO: 0 PER 100 WBC
PLATELET # BLD AUTO: 242 K/UL (ref 150–400)
PMV BLD AUTO: 10.8 FL (ref 8.9–12.9)
POTASSIUM SERPL-SCNC: 4.7 MMOL/L (ref 3.5–5.1)
PROT SERPL-MCNC: 6.8 G/DL (ref 6.4–8.2)
RBC # BLD AUTO: 4.14 M/UL (ref 3.8–5.2)
SODIUM SERPL-SCNC: 142 MMOL/L (ref 136–145)
WBC # BLD AUTO: 7.4 K/UL (ref 3.6–11)

## 2020-11-29 NOTE — PROGRESS NOTES
Mrs. Rose Lopez,  The renal function is coming back to normal.  On November 13 your serum creatinine was 1.49 two days ago it was 1.19. We still need to keep a close eye on this and I recommend we recheck your renal function again in about 2 weeks. Your white blood cell count has come down from 14.2-7.4. How are you feeling hopefully we will be meeting up in the next week or so for appointment please keep me updated on how you are.

## 2020-12-02 DIAGNOSIS — N28.9 ABNORMAL KIDNEY FUNCTION: Primary | ICD-10-CM

## 2020-12-07 ENCOUNTER — VIRTUAL VISIT (OUTPATIENT)
Dept: FAMILY MEDICINE CLINIC | Age: 70
End: 2020-12-07
Payer: MEDICARE

## 2020-12-07 DIAGNOSIS — N28.9 ABNORMAL KIDNEY FUNCTION: ICD-10-CM

## 2020-12-07 DIAGNOSIS — M54.50 ACUTE MIDLINE LOW BACK PAIN WITHOUT SCIATICA: Primary | ICD-10-CM

## 2020-12-07 PROCEDURE — G8399 PT W/DXA RESULTS DOCUMENT: HCPCS | Performed by: FAMILY MEDICINE

## 2020-12-07 PROCEDURE — 1090F PRES/ABSN URINE INCON ASSESS: CPT | Performed by: FAMILY MEDICINE

## 2020-12-07 PROCEDURE — 3017F COLORECTAL CA SCREEN DOC REV: CPT | Performed by: FAMILY MEDICINE

## 2020-12-07 PROCEDURE — 1101F PT FALLS ASSESS-DOCD LE1/YR: CPT | Performed by: FAMILY MEDICINE

## 2020-12-07 PROCEDURE — G9717 DOC PT DX DEP/BP F/U NT REQ: HCPCS | Performed by: FAMILY MEDICINE

## 2020-12-07 PROCEDURE — G9899 SCRN MAM PERF RSLTS DOC: HCPCS | Performed by: FAMILY MEDICINE

## 2020-12-07 PROCEDURE — 99213 OFFICE O/P EST LOW 20 MIN: CPT | Performed by: FAMILY MEDICINE

## 2020-12-07 PROCEDURE — G8427 DOCREV CUR MEDS BY ELIG CLIN: HCPCS | Performed by: FAMILY MEDICINE

## 2020-12-07 PROCEDURE — G0463 HOSPITAL OUTPT CLINIC VISIT: HCPCS | Performed by: FAMILY MEDICINE

## 2020-12-10 NOTE — PROGRESS NOTES
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  Postoperative course uneventful, successful TOV on POD #1,  pain controlled, ambulating.  Tolerating CLD, no N/V, small amount of flatus.   Pt d/c on POD #1 to f/u with Dr. Cordon.  I-stop checked.

## 2020-12-21 ENCOUNTER — LAB ONLY (OUTPATIENT)
Dept: FAMILY MEDICINE CLINIC | Age: 70
End: 2020-12-21

## 2020-12-21 DIAGNOSIS — N28.9 ABNORMAL KIDNEY FUNCTION: ICD-10-CM

## 2020-12-21 LAB
ALBUMIN SERPL-MCNC: 3.9 G/DL (ref 3.5–5)
ALBUMIN/GLOB SERPL: 1.2 {RATIO} (ref 1.1–2.2)
ALP SERPL-CCNC: 108 U/L (ref 45–117)
ALT SERPL-CCNC: 28 U/L (ref 12–78)
ANION GAP SERPL CALC-SCNC: 5 MMOL/L (ref 5–15)
ANION GAP SERPL CALC-SCNC: 8 MMOL/L (ref 5–15)
AST SERPL-CCNC: 17 U/L (ref 15–37)
BILIRUB SERPL-MCNC: 0.5 MG/DL (ref 0.2–1)
BUN SERPL-MCNC: 24 MG/DL (ref 6–20)
BUN SERPL-MCNC: 24 MG/DL (ref 6–20)
BUN/CREAT SERPL: 25 (ref 12–20)
BUN/CREAT SERPL: 25 (ref 12–20)
CALCIUM SERPL-MCNC: 9.7 MG/DL (ref 8.5–10.1)
CALCIUM SERPL-MCNC: 9.7 MG/DL (ref 8.5–10.1)
CHLORIDE SERPL-SCNC: 106 MMOL/L (ref 97–108)
CHLORIDE SERPL-SCNC: 107 MMOL/L (ref 97–108)
CO2 SERPL-SCNC: 26 MMOL/L (ref 21–32)
CO2 SERPL-SCNC: 27 MMOL/L (ref 21–32)
CREAT SERPL-MCNC: 0.95 MG/DL (ref 0.55–1.02)
CREAT SERPL-MCNC: 0.97 MG/DL (ref 0.55–1.02)
GLOBULIN SER CALC-MCNC: 3.2 G/DL (ref 2–4)
GLUCOSE SERPL-MCNC: 94 MG/DL (ref 65–100)
GLUCOSE SERPL-MCNC: 94 MG/DL (ref 65–100)
POTASSIUM SERPL-SCNC: 5.1 MMOL/L (ref 3.5–5.1)
POTASSIUM SERPL-SCNC: 5.3 MMOL/L (ref 3.5–5.1)
PROT SERPL-MCNC: 7.1 G/DL (ref 6.4–8.2)
SODIUM SERPL-SCNC: 139 MMOL/L (ref 136–145)
SODIUM SERPL-SCNC: 140 MMOL/L (ref 136–145)

## 2021-01-10 NOTE — PROGRESS NOTES
Mrs. Smallwood Vic you are doing well the renal function is coming back to where it was. I would like you to continue to be well-hydrated and when I see you in March we will check it again.     If you should have any questions please let me know

## 2021-02-01 ENCOUNTER — NURSE TRIAGE (OUTPATIENT)
Dept: OTHER | Facility: CLINIC | Age: 71
End: 2021-02-01

## 2021-02-01 NOTE — TELEPHONE ENCOUNTER
Reason for Disposition   [1] COVID-19 infection suspected by caller or triager AND [2] mild symptoms (cough, fever, or others) AND [2] no complications or SOB    Answer Assessment - Initial Assessment Questions  1. COVID-19 DIAGNOSIS: \"Who made your Coronavirus (COVID-19) diagnosis? \" \"Was it confirmed by a positive lab test?\" If not diagnosed by a HCP, ask \"Are there lots of cases (community spread) where you live? \" (See public health department website, if unsure)  No diagnosis, no positive test, no known exposure or outbreak within family work place. 2. COVID-19 EXPOSURE: \"Was there any known exposure to COVID before the symptoms began? \" CDC Definition of close contact: within 6 feet (2 meters) for a total of 15 minutes or more over a 24-hour period. Denies    3. ONSET: \"When did the COVID-19 symptoms start? \"       A few days joint began to hurt and its gotten worse. 4. WORST SYMPTOM: \"What is your worst symptom? \" (e.g., cough, fever, shortness of breath, muscle aches)      Joint pain. 5. COUGH: \"Do you have a cough? \" If so, ask: \"How bad is the cough? \"        Denies. 6. FEVER: \"Do you have a fever? \" If so, ask: \"What is your temperature, how was it measured, and when did it start? \"      Denies. 7. RESPIRATORY STATUS: \"Describe your breathing? \" (e.g., shortness of breath, wheezing, unable to speak)       Normal for me. 8. BETTER-SAME-WORSE: \"Are you getting better, staying the same or getting worse compared to yesterday? \"  If getting worse, ask, \"In what way? \"      Getting worse. Joint pain, headache, and muscle pain are all getting worse. 9. HIGH RISK DISEASE: \"Do you have any chronic medical problems? \" (e.g., asthma, heart or lung disease, weak immune system, obesity, etc.)      Denies. 10. PREGNANCY: \"Is there any chance you are pregnant? \" \"When was your last menstrual period? \"        Denies. 11. OTHER SYMPTOMS: \"Do you have any other symptoms? \"  (e.g., chills, fatigue, headache, loss of smell or taste, muscle pain, sore throat; new loss of smell or taste especially support the diagnosis of COVID-19)        Headache, muscle pain. Protocols used: CORONAVIRUS (XCFXD-22) DIAGNOSED OR SUSPECTED-ADULT-AH    Call received on [address]. Brief description of triage: Pt concerned for COVID r/t worsening joint, muscle pain, and head. Triage indicates for patient to call PCP when office is open. Writer gave wife and  information r/t testing and vaccine sites through [facility] ([phone]), [facility]'s [employer] line ([phone])  and the CDC's website. Pt and  had some specific questions about death rate and pre-medicating, writer referred them to previous resources and PCP. Writer provided warm transfer to [employer]. Care advice provided. Recommended using local department of health website for testing locations and up to date information. Caller verbalizes understanding, denies any other questions or concerns; instructed to call back for any new or worsening symptoms.

## 2021-02-26 DIAGNOSIS — Z12.31 VISIT FOR SCREENING MAMMOGRAM: Primary | ICD-10-CM

## 2021-02-26 DIAGNOSIS — E78.2 MIXED HYPERLIPIDEMIA: ICD-10-CM

## 2021-02-28 RX ORDER — ATORVASTATIN CALCIUM 20 MG/1
TABLET, FILM COATED ORAL
Qty: 90 TAB | Refills: 0 | Status: SHIPPED | OUTPATIENT
Start: 2021-02-28 | End: 2021-06-25

## 2021-04-12 ENCOUNTER — OFFICE VISIT (OUTPATIENT)
Dept: FAMILY MEDICINE CLINIC | Age: 71
End: 2021-04-12
Payer: MEDICARE

## 2021-04-12 VITALS
DIASTOLIC BLOOD PRESSURE: 70 MMHG | WEIGHT: 190 LBS | BODY MASS INDEX: 34.96 KG/M2 | TEMPERATURE: 97.7 F | RESPIRATION RATE: 16 BRPM | SYSTOLIC BLOOD PRESSURE: 113 MMHG | HEART RATE: 75 BPM | OXYGEN SATURATION: 98 % | HEIGHT: 62 IN

## 2021-04-12 DIAGNOSIS — E66.01 SEVERE OBESITY (HCC): ICD-10-CM

## 2021-04-12 DIAGNOSIS — M25.50 ARTHRALGIA, UNSPECIFIED JOINT: Primary | ICD-10-CM

## 2021-04-12 DIAGNOSIS — F33.8 SEASONAL AFFECTIVE DISORDER (HCC): ICD-10-CM

## 2021-04-12 PROCEDURE — G8427 DOCREV CUR MEDS BY ELIG CLIN: HCPCS | Performed by: FAMILY MEDICINE

## 2021-04-12 PROCEDURE — G0463 HOSPITAL OUTPT CLINIC VISIT: HCPCS | Performed by: FAMILY MEDICINE

## 2021-04-12 PROCEDURE — G8536 NO DOC ELDER MAL SCRN: HCPCS | Performed by: FAMILY MEDICINE

## 2021-04-12 PROCEDURE — 99214 OFFICE O/P EST MOD 30 MIN: CPT | Performed by: FAMILY MEDICINE

## 2021-04-12 PROCEDURE — G9717 DOC PT DX DEP/BP F/U NT REQ: HCPCS | Performed by: FAMILY MEDICINE

## 2021-04-12 PROCEDURE — 3017F COLORECTAL CA SCREEN DOC REV: CPT | Performed by: FAMILY MEDICINE

## 2021-04-12 PROCEDURE — G9899 SCRN MAM PERF RSLTS DOC: HCPCS | Performed by: FAMILY MEDICINE

## 2021-04-12 PROCEDURE — 1090F PRES/ABSN URINE INCON ASSESS: CPT | Performed by: FAMILY MEDICINE

## 2021-04-12 PROCEDURE — G8417 CALC BMI ABV UP PARAM F/U: HCPCS | Performed by: FAMILY MEDICINE

## 2021-04-12 PROCEDURE — 1101F PT FALLS ASSESS-DOCD LE1/YR: CPT | Performed by: FAMILY MEDICINE

## 2021-04-12 PROCEDURE — G8399 PT W/DXA RESULTS DOCUMENT: HCPCS | Performed by: FAMILY MEDICINE

## 2021-04-12 NOTE — PROGRESS NOTES
HPI  Rachna Lantigua 78 y.o. female  presents to the office today for anxiety, depression, and a cholesterol problem. Blood pressure 113/70, pulse 75, temperature 97.7 °F (36.5 °C), temperature source Temporal, resp. rate 16, height 5' 2\" (1.575 m), weight 190 lb (86.2 kg), SpO2 98 %. Body mass index is 34.75 kg/m². Chief Complaint   Patient presents with    Anxiety    Depression    Cholesterol Problem        Anxiety/depresion: Pt continues with Lexapro 20 mg/day and Buspar 10 mg/BID. Hyperlipidemia: Lipid panel on 8/32/2020 notable for total cholesterol 215, HDL 63, , and triglycerides 208. Pt continues with Lipitor 20 mg/day. Obesity: I have reviewed/discussed the above normal BMI with the patient. I have recommended the following interventions: dietary management education, guidance, and counseling, encourage exercise and monitor weight . Health maintenance: Pt has received both of her COVID-19 vaccinations. Pt will have updated lab work performed during today's OV. Pt informs me that she has been experiencing pain and discomfort, especially in her joints and her hands. She wonders if this may be related to her previous diagnosis of Lyme disease. I have provided pt with a referral to Dr. Miguel Angel Ivy, rheumatology, for further care and assessment. Upon examination, pt's hands appear swollen in nature. Current Outpatient Medications   Medication Sig Dispense Refill    atorvastatin (LIPITOR) 20 mg tablet TAKE 1 TABLET BY MOUTH EVERY DAY 90 Tab 0    ondansetron (Zofran ODT) 4 mg disintegrating tablet Take 1 Tab by mouth every eight (8) hours as needed for Nausea.  10 Tab 0    meloxicam (MOBIC) 15 mg tablet Take 1 tab by mouth every day for Osteoarthritis 90 Tab 1    estradioL (ESTRACE) 0.01 % (0.1 mg/gram) vaginal cream INSERT 1 GRAM VAGINALLY TWICE WEEKLY      albuterol (PROVENTIL HFA, VENTOLIN HFA, PROAIR HFA) 90 mcg/actuation inhaler Take 2 Puffs by inhalation every six (6) hours as needed for Wheezing. 1 Inhaler 0    escitalopram oxalate (LEXAPRO) 20 mg tablet Take 1 Tab by mouth daily. Indications: MAJOR DEPRESSIVE DISORDER (Patient taking differently: Take 10 mg by mouth daily. Indications: major depressive disorder) 30 Tab 0    busPIRone (BUSPAR) 10 mg tablet Take 1 Tab by mouth two (2) times a day. Indications: GENERALIZED ANXIETY DISORDER 60 Tab 1    FEXOFENADINE/PSEUDOEPHEDRINE (ALLEGRA-D 12 HOUR PO) Take 1 Tab by mouth daily as needed (Allergies). Indications: SEASONAL ALLERGIES       No Known Allergies  Past Medical History:   Diagnosis Date    Anxiety     AR (allergic rhinitis)     Depression     Lyme disease 2020    treated with doxy    Spondylosis      Past Surgical History:   Procedure Laterality Date    HX HYSTERECTOMY       Family History   Problem Relation Age of Onset    Cancer Mother 80        lyphoma    Lung Disease Father     Heart Disease Father 80        a fib    Other Brother 40        Amlydosis     Social History     Tobacco Use    Smoking status: Former Smoker     Years: 30.00     Quit date: 2003     Years since quittin.6    Smokeless tobacco: Never Used   Substance Use Topics    Alcohol use: Yes     Alcohol/week: 0.8 standard drinks     Types: 1 Glasses of wine per week     Comment: ocassional        Review of Systems   Constitutional: Negative for chills and fever. HENT: Negative for hearing loss and tinnitus. Eyes: Negative for blurred vision and double vision. Respiratory: Negative for cough and shortness of breath. Cardiovascular: Negative for chest pain and palpitations. Gastrointestinal: Negative for nausea and vomiting. Genitourinary: Negative for dysuria and frequency. Musculoskeletal: Negative for back pain and falls. Skin: Negative for itching and rash. Neurological: Negative for dizziness, loss of consciousness and headaches. Endo/Heme/Allergies: Negative.     Psychiatric/Behavioral: Negative for depression. The patient is not nervous/anxious. Physical Exam  Constitutional:       Appearance: Normal appearance. HENT:      Head: Normocephalic and atraumatic. Right Ear: Tympanic membrane, ear canal and external ear normal.      Left Ear: Tympanic membrane, ear canal and external ear normal.      Nose: Nose normal.      Mouth/Throat:      Mouth: Mucous membranes are moist.      Pharynx: Oropharynx is clear. Eyes:      Extraocular Movements: Extraocular movements intact. Conjunctiva/sclera: Conjunctivae normal.      Pupils: Pupils are equal, round, and reactive to light. Cardiovascular:      Rate and Rhythm: Normal rate and regular rhythm. Pulses: Normal pulses. Heart sounds: Normal heart sounds. Pulmonary:      Effort: Pulmonary effort is normal.      Breath sounds: Normal breath sounds. Abdominal:      General: Abdomen is flat. Bowel sounds are normal.      Palpations: Abdomen is soft. Genitourinary:     General: Normal vulva. Rectum: Normal.   Musculoskeletal: Normal range of motion. Right hand: She exhibits swelling. Left hand: She exhibits swelling. Skin:     General: Skin is warm and dry. Neurological:      General: No focal deficit present. Mental Status: She is alert and oriented to person, place, and time. Mental status is at baseline. Psychiatric:         Mood and Affect: Mood normal.         Behavior: Behavior normal.         Judgment: Judgment normal.           ASSESSMENT and PLAN  Diagnoses and all orders for this visit:    1. Arthralgia, unspecified joint  Pt informs me that she has been experiencing pain and discomfort, especially in her joints and her hands. She wonders if this may be related to her previous diagnosis of Lyme disease. I have provided pt with a referral to Dr. Miguel Angel Ivy, rheumatology, for further care and assessment. Upon examination, pt's hands appear swollen in nature.    -     REFERRAL TO RHEUMATOLOGY  -     RHEUMATOID FACTOR, QL; Future  -     CYCLIC CITRUL PEPTIDE AB, IGG; Future  -     SED RATE (ESR); Future  -     C REACTIVE PROTEIN, QT; Future  -     CBC WITH AUTOMATED DIFF; Future  -     METABOLIC PANEL, COMPREHENSIVE; Future  -     ANTINUCLEAR ANTIBODIES, IFA; Future    2. Seasonal affective disorder ([facility].)  Pt continues with Lexapro 20 mg/day and Buspar 10 mg/BID. Assessment & Plan:  Stable, based on history, physical exam and review of pertinent labs, studies and medications; meds reconciled; continue current treatment plan. 3. Severe obesity ([facility]  I have reviewed/discussed the above normal BMI with the patient. I have recommended the following interventions: dietary management education, guidance, and counseling, encourage exercise and monitor weight . Assessment & Plan:  Uncontrolled, based on history, physical exam and review of pertinent labs, studies and medications; meds reconciled; lifestyle modifications recommended. Medication risks/benefits/costs/interactions/alternatives discussed with patient. Advised patient to call back or return to office if symptoms worsen/change/persist.  If patient cannot reach us or should anything more severe/urgent arise he/she should proceed directly to the nearest emergency department. Discussed expected course/resolution/complications of diagnosis in detail with patient. Patient given a written after visit summary which includes diagnoses, current medications and vitals. Patient expressed understanding with the diagnosis and plan. Written by khari Lucero, as dictated by Srinivas Gant M.D.    3:45 PM - 4:02 PM    Total time spent with the patient 17 minutes, greater than 50% of time spent counseling patient.

## 2021-04-12 NOTE — PROGRESS NOTES
Chief Complaint   Patient presents with    Anxiety    Depression    Cholesterol Problem     1. Have you been to the ER, urgent care clinic since your last visit? Hospitalized since your last visit? no    2. Have you seen or consulted any other health care providers outside of the [address] [place] [address] since your last visit? Include any pap smears or colon screening.    no

## 2021-04-13 LAB
ALBUMIN SERPL-MCNC: 4 G/DL (ref 3.5–5)
ALBUMIN/GLOB SERPL: 1.3 {RATIO} (ref 1.1–2.2)
ALP SERPL-CCNC: 101 U/L (ref 45–117)
ALT SERPL-CCNC: 43 U/L (ref 12–78)
ANION GAP SERPL CALC-SCNC: 6 MMOL/L (ref 5–15)
AST SERPL-CCNC: 21 U/L (ref 15–37)
BASOPHILS # BLD: 0 K/UL (ref 0–0.1)
BASOPHILS NFR BLD: 0 % (ref 0–1)
BILIRUB SERPL-MCNC: 0.4 MG/DL (ref 0.2–1)
BUN SERPL-MCNC: 27 MG/DL (ref 6–20)
BUN/CREAT SERPL: 27 (ref 12–20)
CALCIUM SERPL-MCNC: 9.9 MG/DL (ref 8.5–10.1)
CHLORIDE SERPL-SCNC: 108 MMOL/L (ref 97–108)
CO2 SERPL-SCNC: 27 MMOL/L (ref 21–32)
CREAT SERPL-MCNC: 1.01 MG/DL (ref 0.55–1.02)
CRP SERPL-MCNC: <0.29 MG/DL (ref 0–0.6)
DIFFERENTIAL METHOD BLD: ABNORMAL
EOSINOPHIL # BLD: 0.3 K/UL (ref 0–0.4)
EOSINOPHIL NFR BLD: 3 % (ref 0–7)
ERYTHROCYTE [DISTWIDTH] IN BLOOD BY AUTOMATED COUNT: 13.3 % (ref 11.5–14.5)
ERYTHROCYTE [SEDIMENTATION RATE] IN BLOOD: 12 MM/HR (ref 0–30)
GLOBULIN SER CALC-MCNC: 3.2 G/DL (ref 2–4)
GLUCOSE SERPL-MCNC: 84 MG/DL (ref 65–100)
HCT VFR BLD AUTO: 39.6 % (ref 35–47)
HGB BLD-MCNC: 12.3 G/DL (ref 11.5–16)
IMM GRANULOCYTES # BLD AUTO: 0.1 K/UL (ref 0–0.04)
IMM GRANULOCYTES NFR BLD AUTO: 1 % (ref 0–0.5)
LYMPHOCYTES # BLD: 2.2 K/UL (ref 0.8–3.5)
LYMPHOCYTES NFR BLD: 24 % (ref 12–49)
MCH RBC QN AUTO: 28.1 PG (ref 26–34)
MCHC RBC AUTO-ENTMCNC: 31.1 G/DL (ref 30–36.5)
MCV RBC AUTO: 90.6 FL (ref 80–99)
MONOCYTES # BLD: 1 K/UL (ref 0–1)
MONOCYTES NFR BLD: 11 % (ref 5–13)
NEUTS SEG # BLD: 5.7 K/UL (ref 1.8–8)
NEUTS SEG NFR BLD: 61 % (ref 32–75)
NRBC # BLD: 0 K/UL (ref 0–0.01)
NRBC BLD-RTO: 0 PER 100 WBC
PLATELET # BLD AUTO: 265 K/UL (ref 150–400)
PMV BLD AUTO: 11.4 FL (ref 8.9–12.9)
POTASSIUM SERPL-SCNC: 5 MMOL/L (ref 3.5–5.1)
PROT SERPL-MCNC: 7.2 G/DL (ref 6.4–8.2)
RBC # BLD AUTO: 4.37 M/UL (ref 3.8–5.2)
SODIUM SERPL-SCNC: 141 MMOL/L (ref 136–145)
WBC # BLD AUTO: 9.2 K/UL (ref 3.6–11)

## 2021-04-14 DIAGNOSIS — Z12.31 VISIT FOR SCREENING MAMMOGRAM: ICD-10-CM

## 2021-04-14 LAB
ANA TITR SER IF: NEGATIVE {TITER}
CCP IGA+IGG SERPL IA-ACNC: 3 UNITS (ref 0–19)

## 2021-04-16 ENCOUNTER — TELEPHONE (OUTPATIENT)
Dept: FAMILY MEDICINE CLINIC | Age: 71
End: 2021-04-16

## 2021-04-16 NOTE — TELEPHONE ENCOUNTER
----- Message from Cory Wilson MD sent at 4/12/2021  3:59 PM EDT -----  Regarding: mammogram  Please get  mammogram    Was done [facility] ( [facility])

## 2021-04-18 NOTE — PROGRESS NOTES
Mrs. Raymundo Wagner,  No acute issues noted on labs there stable. The test for rheumatoid arthritis came back  negative. Would you may want to do is as stated in your office visit we may need to go ahead and get you in with rheumatology.     If you have any questions please let me know

## 2021-06-02 DIAGNOSIS — M17.11 PRIMARY OSTEOARTHRITIS OF RIGHT KNEE: ICD-10-CM

## 2021-06-03 RX ORDER — MELOXICAM 15 MG/1
TABLET ORAL
Qty: 90 TABLET | Refills: 1 | Status: SHIPPED | OUTPATIENT
Start: 2021-06-03 | End: 2021-12-28

## 2021-06-21 DIAGNOSIS — E78.2 MIXED HYPERLIPIDEMIA: ICD-10-CM

## 2021-06-25 RX ORDER — ATORVASTATIN CALCIUM 20 MG/1
TABLET, FILM COATED ORAL
Qty: 90 TABLET | Refills: 0 | Status: SHIPPED | OUTPATIENT
Start: 2021-06-25 | End: 2021-09-19

## 2021-09-19 DIAGNOSIS — E78.2 MIXED HYPERLIPIDEMIA: ICD-10-CM

## 2021-09-19 RX ORDER — ATORVASTATIN CALCIUM 20 MG/1
TABLET, FILM COATED ORAL
Qty: 90 TABLET | Refills: 0 | Status: SHIPPED | OUTPATIENT
Start: 2021-09-19 | End: 2021-12-13

## 2021-10-12 ENCOUNTER — OFFICE VISIT (OUTPATIENT)
Dept: FAMILY MEDICINE CLINIC | Age: 71
End: 2021-10-12
Payer: MEDICARE

## 2021-10-12 VITALS
HEART RATE: 74 BPM | HEIGHT: 62 IN | WEIGHT: 190 LBS | RESPIRATION RATE: 16 BRPM | TEMPERATURE: 97.8 F | BODY MASS INDEX: 34.96 KG/M2 | OXYGEN SATURATION: 97 % | SYSTOLIC BLOOD PRESSURE: 118 MMHG | DIASTOLIC BLOOD PRESSURE: 78 MMHG

## 2021-10-12 DIAGNOSIS — E66.9 OBESITY, CLASS I, BMI 30-34.9: ICD-10-CM

## 2021-10-12 DIAGNOSIS — E55.9 VITAMIN D DEFICIENCY: ICD-10-CM

## 2021-10-12 DIAGNOSIS — R73.09 ELEVATED GLUCOSE: ICD-10-CM

## 2021-10-12 DIAGNOSIS — Z23 NEEDS FLU SHOT: ICD-10-CM

## 2021-10-12 DIAGNOSIS — M25.50 ARTHRALGIA, UNSPECIFIED JOINT: ICD-10-CM

## 2021-10-12 DIAGNOSIS — E78.2 MIXED HYPERLIPIDEMIA: Primary | ICD-10-CM

## 2021-10-12 DIAGNOSIS — Z00.00 ENCOUNTER FOR MEDICARE ANNUAL WELLNESS EXAM: ICD-10-CM

## 2021-10-12 LAB
25(OH)D3 SERPL-MCNC: 48.1 NG/ML (ref 30–100)
ALBUMIN SERPL-MCNC: 3.7 G/DL (ref 3.5–5)
ALBUMIN/GLOB SERPL: 1.1 {RATIO} (ref 1.1–2.2)
ALP SERPL-CCNC: 100 U/L (ref 45–117)
ALT SERPL-CCNC: 20 U/L (ref 12–78)
ANION GAP SERPL CALC-SCNC: 4 MMOL/L (ref 5–15)
AST SERPL-CCNC: 17 U/L (ref 15–37)
BASOPHILS # BLD: 0.1 K/UL (ref 0–0.1)
BASOPHILS NFR BLD: 1 % (ref 0–1)
BILIRUB SERPL-MCNC: 0.3 MG/DL (ref 0.2–1)
BUN SERPL-MCNC: 25 MG/DL (ref 6–20)
BUN/CREAT SERPL: 24 (ref 12–20)
CALCIUM SERPL-MCNC: 9.5 MG/DL (ref 8.5–10.1)
CHLORIDE SERPL-SCNC: 112 MMOL/L (ref 97–108)
CHOLEST SERPL-MCNC: 189 MG/DL
CO2 SERPL-SCNC: 25 MMOL/L (ref 21–32)
CREAT SERPL-MCNC: 1.03 MG/DL (ref 0.55–1.02)
DIFFERENTIAL METHOD BLD: ABNORMAL
EOSINOPHIL # BLD: 0.2 K/UL (ref 0–0.4)
EOSINOPHIL NFR BLD: 2 % (ref 0–7)
ERYTHROCYTE [DISTWIDTH] IN BLOOD BY AUTOMATED COUNT: 12.3 % (ref 11.5–14.5)
EST. AVERAGE GLUCOSE BLD GHB EST-MCNC: 114 MG/DL
GLOBULIN SER CALC-MCNC: 3.3 G/DL (ref 2–4)
GLUCOSE SERPL-MCNC: 86 MG/DL (ref 65–100)
HBA1C MFR BLD: 5.6 % (ref 4–5.6)
HCT VFR BLD AUTO: 40.8 % (ref 35–47)
HDLC SERPL-MCNC: 72 MG/DL
HDLC SERPL: 2.6 {RATIO} (ref 0–5)
HGB BLD-MCNC: 13 G/DL (ref 11.5–16)
IMM GRANULOCYTES # BLD AUTO: 0.1 K/UL (ref 0–0.04)
IMM GRANULOCYTES NFR BLD AUTO: 1 % (ref 0–0.5)
LDLC SERPL CALC-MCNC: 75 MG/DL (ref 0–100)
LYMPHOCYTES # BLD: 1.9 K/UL (ref 0.8–3.5)
LYMPHOCYTES NFR BLD: 22 % (ref 12–49)
MCH RBC QN AUTO: 28.9 PG (ref 26–34)
MCHC RBC AUTO-ENTMCNC: 31.9 G/DL (ref 30–36.5)
MCV RBC AUTO: 90.7 FL (ref 80–99)
MONOCYTES # BLD: 0.8 K/UL (ref 0–1)
MONOCYTES NFR BLD: 9 % (ref 5–13)
NEUTS SEG # BLD: 5.7 K/UL (ref 1.8–8)
NEUTS SEG NFR BLD: 65 % (ref 32–75)
NRBC # BLD: 0 K/UL (ref 0–0.01)
NRBC BLD-RTO: 0 PER 100 WBC
PLATELET # BLD AUTO: 269 K/UL (ref 150–400)
PMV BLD AUTO: 11.2 FL (ref 8.9–12.9)
POTASSIUM SERPL-SCNC: 4.9 MMOL/L (ref 3.5–5.1)
PROT SERPL-MCNC: 7 G/DL (ref 6.4–8.2)
RBC # BLD AUTO: 4.5 M/UL (ref 3.8–5.2)
SODIUM SERPL-SCNC: 141 MMOL/L (ref 136–145)
TRIGL SERPL-MCNC: 210 MG/DL (ref ?–150)
VLDLC SERPL CALC-MCNC: 42 MG/DL
WBC # BLD AUTO: 8.7 K/UL (ref 3.6–11)

## 2021-10-12 PROCEDURE — G0463 HOSPITAL OUTPT CLINIC VISIT: HCPCS | Performed by: FAMILY MEDICINE

## 2021-10-12 PROCEDURE — G9899 SCRN MAM PERF RSLTS DOC: HCPCS | Performed by: FAMILY MEDICINE

## 2021-10-12 PROCEDURE — 99214 OFFICE O/P EST MOD 30 MIN: CPT | Performed by: FAMILY MEDICINE

## 2021-10-12 PROCEDURE — G8427 DOCREV CUR MEDS BY ELIG CLIN: HCPCS | Performed by: FAMILY MEDICINE

## 2021-10-12 PROCEDURE — 1101F PT FALLS ASSESS-DOCD LE1/YR: CPT | Performed by: FAMILY MEDICINE

## 2021-10-12 PROCEDURE — G8417 CALC BMI ABV UP PARAM F/U: HCPCS | Performed by: FAMILY MEDICINE

## 2021-10-12 PROCEDURE — G9717 DOC PT DX DEP/BP F/U NT REQ: HCPCS | Performed by: FAMILY MEDICINE

## 2021-10-12 PROCEDURE — G8536 NO DOC ELDER MAL SCRN: HCPCS | Performed by: FAMILY MEDICINE

## 2021-10-12 PROCEDURE — 3017F COLORECTAL CA SCREEN DOC REV: CPT | Performed by: FAMILY MEDICINE

## 2021-10-12 PROCEDURE — 1090F PRES/ABSN URINE INCON ASSESS: CPT | Performed by: FAMILY MEDICINE

## 2021-10-12 PROCEDURE — 90694 VACC AIIV4 NO PRSRV 0.5ML IM: CPT | Performed by: FAMILY MEDICINE

## 2021-10-12 PROCEDURE — G0439 PPPS, SUBSEQ VISIT: HCPCS | Performed by: FAMILY MEDICINE

## 2021-10-12 PROCEDURE — G8399 PT W/DXA RESULTS DOCUMENT: HCPCS | Performed by: FAMILY MEDICINE

## 2021-10-12 RX ORDER — BISMUTH SUBSALICYLATE 262 MG
1 TABLET,CHEWABLE ORAL DAILY
COMMUNITY

## 2021-10-12 RX ORDER — CHOLECALCIFEROL (VITAMIN D3) 50 MCG
CAPSULE ORAL
COMMUNITY

## 2021-10-12 RX ORDER — CALCIUM CARBONATE/VITAMIN D3 600 MG-125
TABLET ORAL
COMMUNITY

## 2021-10-12 RX ORDER — ZINC GLUCONATE 10 MG
LOZENGE ORAL
COMMUNITY

## 2021-10-12 NOTE — PROGRESS NOTES
This is the Subsequent Medicare Annual Wellness Exam, performed 12 months or more after the Initial AWV or the last Subsequent AWV    I have reviewed the patient's medical history in detail and updated the computerized patient record. Assessment/Plan   Education and counseling provided:  Are appropriate based on today's review and evaluation    1. Mixed hyperlipidemia  -     METABOLIC PANEL, COMPREHENSIVE; Future  -     LIPID PANEL; Future  2. Arthralgia, unspecified joint  -     CBC WITH AUTOMATED DIFF; Future  3. Elevated glucose  -     HEMOGLOBIN A1C WITH EAG; Future  4. Obesity, Class I, BMI 30-34.9  5. Vitamin D deficiency  -     VITAMIN D, 25 HYDROXY; Future  6. Encounter for Medicare annual wellness exam  7.  Needs flu shot  -     FLU (FLUAD QUAD INFLUENZA VACCINE,QUAD,ADJUVANTED)       Depression Risk Factor Screening     3 most recent PHQ Screens 10/12/2021   Little interest or pleasure in doing things Not at all   Feeling down, depressed, irritable, or hopeless Not at all   Total Score PHQ 2 0   Trouble falling or staying asleep, or sleeping too much -   Feeling tired or having little energy -   Poor appetite, weight loss, or overeating -   Feeling bad about yourself - or that you are a failure or have let yourself or your family down -   Trouble concentrating on things such as school, work, reading, or watching TV -   Moving or speaking so slowly that other people could have noticed; or the opposite being so fidgety that others notice -   Thoughts of being better off dead, or hurting yourself in some way -   PHQ 9 Score -   How difficult have these problems made it for you to do your work, take care of your home and get along with others -       Alcohol Risk Screen    Do you average more than 1 drink per night or more than 7 drinks a week:  No    On any one occasion in the past three months have you have had more than 3 drinks containing alcohol:  No        Functional Ability and Level of Safety Hearing: The patient needs further evaluation. Activities of Daily Living: The home contains: no safety equipment. Patient does total self care      Ambulation: with no difficulty     Fall Risk:  Fall Risk Assessment, last 12 mths 10/12/2021   Able to walk? Yes   Fall in past 12 months? 0   Do you feel unsteady? 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 A    Acute meniscal tear of knee S83.209A    Obesity, Class I, BMI 30-34.9 E66.9    Seasonal affective disorder (HCC) F33.8    Primary osteoarthritis of right knee M17.11    Mixed hyperlipidemia E78.2    Severe obesity (HCC) E66.01     Past Medical History:   Diagnosis Date    Anxiety     AR (allergic rhinitis)     Depression     Lyme disease 09/2020    treated with doxy    Spondylosis       Past Surgical History:   Procedure Laterality Date    HX HYSTERECTOMY       Current Outpatient Medications   Medication Sig Dispense Refill    magnesium 250 mg tab Take  by mouth.  calcium-cholecalciferol, d3, (CALCIUM 600 + D) 600-125 mg-unit tab Take  by mouth.  multivitamin (ONE A DAY) tablet Take 1 Tablet by mouth daily.  omega 3-dha-epa-fish oil (Fish OiL) 100-160-1,000 mg cap Take  by mouth.       atorvastatin (LIPITOR) 20 mg tablet TAKE 1 TABLET BY MOUTH EVERY DAY 90 Tablet 0    meloxicam (MOBIC) 15 mg tablet TAKE 1 TAB BY MOUTH EVERY DAY FOR OSTEOARTHRITIS 90 Tablet 1    ondansetron (Zofran ODT) 4 mg disintegrating tablet Take 1 Tab by mouth every eight (8) hours as needed for Nausea. 10 Tab 0    estradioL (ESTRACE) 0.01 % (0.1 mg/gram) vaginal cream INSERT 1 GRAM VAGINALLY TWICE WEEKLY      albuterol (PROVENTIL HFA, VENTOLIN HFA, PROAIR HFA) 90 mcg/actuation inhaler Take 2 Puffs by inhalation every six (6) hours as needed for Wheezing. 1 Inhaler 0    escitalopram oxalate (LEXAPRO) 20 mg tablet Take 1 Tab by mouth daily. Indications: MAJOR DEPRESSIVE DISORDER (Patient taking differently: Take 10 mg by mouth daily. Indications: major depressive disorder) 30 Tab 0    busPIRone (BUSPAR) 10 mg tablet Take 1 Tab by mouth two (2) times a day. Indications: GENERALIZED ANXIETY DISORDER 60 Tab 1    FEXOFENADINE/PSEUDOEPHEDRINE (ALLEGRA-D 12 HOUR PO) Take 1 Tab by mouth daily as needed (Allergies). Indications: SEASONAL ALLERGIES       No Known Allergies    Family History   Problem Relation Age of Onset    Cancer Mother 80        lyphoma    Lung Disease Father     Heart Disease Father 80        a fib    Other Brother 40        Amlydosis     Social History     Tobacco Use    Smoking status: Former Smoker     Years: 30.00     Quit date: 2003     Years since quittin.1    Smokeless tobacco: Never Used   Substance Use Topics    Alcohol use:  Yes     Alcohol/week: 0.8 standard drinks     Types: 1 Glasses of wine per week     Comment: steve Ladd MD

## 2021-10-12 NOTE — PROGRESS NOTES
HPI  Cintia Demarco 78 y.o. female  presents to the office today for an AWV. Blood pressure 118/78, pulse 74, temperature 97.8 °F (36.6 °C), temperature source Temporal, resp. rate 16, height 5' 2\" (1.575 m), weight 190 lb (86.2 kg), SpO2 97 %. Body mass index is 34.75 kg/m². Chief Complaint   Patient presents with    Annual Wellness Visit        Hyperlipidemia: Lipid panel on 08/31/20 notable for total cholesterol 215, HDL 63, , and triglycerides 208. Pt continues with Lipitor 20mg 1 tablet orally daily. Pt will have updated lab work performed during today's OV. Vitamin D Deficiency:  Pt's last Vitamin D level was 47.3 on 03/05/20. Pt is currently on Calcium-Cholecalciferol 600-125 mg unit tab take orally. Pt requests a refill of their medication, which I have granted. Elevated Glucose: Pt's last A1c was 5.7 on 08/31/20. Pt is not on any current diabetic medications. Pt will have updated lab work performed during today's OV. Arthralgia: Pt is currently on Mobic15 mg 1 tablet orally daily. Obesity: I have reviewed/discussed the above normal BMI with the patient. I have recommended the following interventions: dietary management education, guidance, and counseling, encourage exercise and monitor weight. Health Maintenance:  Medicare questionnaire discussed and responses reviewed today in office. Pt reports that she consumes alcohol in rare occasions. Pt requests to receive their flu shot during today's OV;  I will have one of my nurses administer it. Pt endorses that she has received her 1st dose of her Shingrix vaccine and will receive 2nd in the future. Current Outpatient Medications   Medication Sig Dispense Refill    magnesium 250 mg tab Take  by mouth.  calcium-cholecalciferol, d3, (CALCIUM 600 + D) 600-125 mg-unit tab Take  by mouth.  multivitamin (ONE A DAY) tablet Take 1 Tablet by mouth daily.       omega 3-dha-epa-fish oil (Fish OiL) 100-160-1,000 mg cap Take  by mouth.  atorvastatin (LIPITOR) 20 mg tablet TAKE 1 TABLET BY MOUTH EVERY DAY 90 Tablet 0    meloxicam (MOBIC) 15 mg tablet TAKE 1 TAB BY MOUTH EVERY DAY FOR OSTEOARTHRITIS 90 Tablet 1    ondansetron (Zofran ODT) 4 mg disintegrating tablet Take 1 Tab by mouth every eight (8) hours as needed for Nausea. 10 Tab 0    estradioL (ESTRACE) 0.01 % (0.1 mg/gram) vaginal cream INSERT 1 GRAM VAGINALLY TWICE WEEKLY      albuterol (PROVENTIL HFA, VENTOLIN HFA, PROAIR HFA) 90 mcg/actuation inhaler Take 2 Puffs by inhalation every six (6) hours as needed for Wheezing. 1 Inhaler 0    escitalopram oxalate (LEXAPRO) 20 mg tablet Take 1 Tab by mouth daily. Indications: MAJOR DEPRESSIVE DISORDER (Patient taking differently: Take 10 mg by mouth daily. Indications: major depressive disorder) 30 Tab 0    busPIRone (BUSPAR) 10 mg tablet Take 1 Tab by mouth two (2) times a day. Indications: GENERALIZED ANXIETY DISORDER 60 Tab 1    FEXOFENADINE/PSEUDOEPHEDRINE (ALLEGRA-D 12 HOUR PO) Take 1 Tab by mouth daily as needed (Allergies). Indications: SEASONAL ALLERGIES       No Known Allergies  Past Medical History:   Diagnosis Date    Anxiety     AR (allergic rhinitis)     Depression     Lyme disease 2020    treated with doxy    Spondylosis      Past Surgical History:   Procedure Laterality Date    HX HYSTERECTOMY       Family History   Problem Relation Age of Onset    Cancer Mother 80        lyphoma    Lung Disease Father     Heart Disease Father 80        a fib    Other Brother 40        Amlydosis     Social History     Tobacco Use    Smoking status: Former Smoker     Years: 30.00     Quit date: 2003     Years since quittin.1    Smokeless tobacco: Never Used   Substance Use Topics    Alcohol use: Yes     Alcohol/week: 0.8 standard drinks     Types: 1 Glasses of wine per week     Comment: ocassional        Review of Systems   Constitutional: Negative for chills and fever.    HENT: Negative for hearing loss and tinnitus. Eyes: Negative for blurred vision and double vision. Respiratory: Negative for cough and shortness of breath. Cardiovascular: Negative for chest pain and palpitations. Gastrointestinal: Negative for nausea and vomiting. Genitourinary: Negative for dysuria and frequency. Musculoskeletal: Negative for back pain and falls. Skin: Negative for itching and rash. Neurological: Negative for dizziness, loss of consciousness and headaches. Endo/Heme/Allergies: Negative. Psychiatric/Behavioral: Negative for depression. The patient is not nervous/anxious. Physical Exam  Constitutional:       Appearance: Normal appearance. HENT:      Head: Normocephalic and atraumatic. Right Ear: Tympanic membrane, ear canal and external ear normal.      Left Ear: Tympanic membrane, ear canal and external ear normal.      Nose: Nose normal.      Mouth/Throat:      Mouth: Mucous membranes are moist.      Pharynx: Oropharynx is clear. Eyes:      Extraocular Movements: Extraocular movements intact. Conjunctiva/sclera: Conjunctivae normal.      Pupils: Pupils are equal, round, and reactive to light. Cardiovascular:      Rate and Rhythm: Normal rate and regular rhythm. Pulses: Normal pulses. Heart sounds: Normal heart sounds. Pulmonary:      Effort: Pulmonary effort is normal.      Breath sounds: Normal breath sounds. Abdominal:      General: Abdomen is flat. Bowel sounds are normal.      Palpations: Abdomen is soft. Genitourinary:     General: Normal vulva. Rectum: Normal.   Musculoskeletal:         General: Normal range of motion. Skin:     General: Skin is warm and dry. Neurological:      General: No focal deficit present. Mental Status: She is alert and oriented to person, place, and time. Mental status is at baseline.    Psychiatric:         Mood and Affect: Mood normal.         Behavior: Behavior normal.         Judgment: Judgment normal.           ASSESSMENT and PLAN  Diagnoses and all orders for this visit:    1. Mixed hyperlipidemia  Lipid panel on 08/31/20 notable for total cholesterol 215, HDL 63, , and triglycerides 208. Pt continues with Lipitor 20mg 1 tablet orally daily. Pt will have updated lab work performed during today's [address]; Future  -     LIPID PANEL; Future    2. Arthralgia, unspecified joint  Pt is currently on Mobic15 mg 1 tablet orally daily.  -     CBC WITH AUTOMATED DIFF; Future    3. Elevated glucose   Pt's last A1c was 5.7 on 08/31/20. Pt is not on any current diabetic medications. Pt will have updated lab work performed during today's OV.    -     HEMOGLOBIN A1C WITH EAG; Future    4. Obesity, Class I, BMI 30-34.9  I have reviewed/discussed the above normal BMI with the patient. I have recommended the following interventions: dietary management education, guidance, and counseling, encourage exercise and monitor weight. 5. Vitamin D deficiency  Pt's last Vitamin D level was 47.3 on 03/05/20. Pt is currently on Calcium-Cholecalciferol 600-125 mg unit tab take orally. Pt requests a refill of their medication, which I have granted  -     VITAMIN D, 25 HYDROXY; Future    6. Encounter for Medicare annual wellness exam  Medicare questionnaire discussed and responses reviewed today in office. Pt reports that she consumes alcohol in rare occasions. 7. Needs flu shot  Pt requests to receive their flu shot during today's OV;  I will have one of my nurses administer it.   -     FLU (FLUAD QUAD INFLUENZA VACCINE,QUAD,ADJUVANTED)            Follow-up and Dispositions    · Return in about 6 months (around 4/12/2022) for follow up. Medication risks/benefits/costs/interactions/alternatives discussed with patient.   Advised patient to call back or return to office if symptoms worsen/change/persist.  If patient cannot reach us or should anything more severe/urgent arise he/she should proceed directly to the nearest emergency department. Discussed expected course/resolution/complications of diagnosis in detail with patient. Patient given a written after visit summary which includes diagnoses, current medications and vitals. Patient expressed understanding with the diagnosis and plan. Written by khari Gómez, as dictated by Sendy Artis M.D.    11:26 AM - 11:34 AM    Total time spent with the patient 8 minutes, greater than 50% of time spent counseling patient.

## 2021-10-12 NOTE — PROGRESS NOTES
Chief Complaint   Patient presents with   [employer] Annual Wellness Visit     1. Have you been to the ER, urgent care clinic since your last visit? Hospitalized since your last visit?no    2. Have you seen or consulted any other health care providers outside of the [address] [place] [address] since your last visit? Include any pap smears or colon screening.    No    Pap- last 2 years ago UTD  Mammogram - yes   Colonoscopy - yes

## 2021-11-09 NOTE — PROGRESS NOTES
Cely Lopez,  All of your labs appear stable there is not any acute issues if you have any questions please let me know have a good day

## 2021-11-18 ENCOUNTER — APPOINTMENT (OUTPATIENT)
Dept: GENERAL RADIOLOGY | Age: 71
End: 2021-11-18
Attending: NURSE PRACTITIONER
Payer: MEDICARE

## 2021-11-18 ENCOUNTER — HOSPITAL ENCOUNTER (EMERGENCY)
Age: 71
Discharge: HOME | End: 2021-11-18
Attending: EMERGENCY MEDICINE
Payer: MEDICARE

## 2021-11-18 VITALS
DIASTOLIC BLOOD PRESSURE: 83 MMHG | RESPIRATION RATE: 16 BRPM | HEIGHT: 62 IN | BODY MASS INDEX: 34.96 KG/M2 | TEMPERATURE: 97.5 F | WEIGHT: 190 LBS | OXYGEN SATURATION: 93 % | HEART RATE: 75 BPM | SYSTOLIC BLOOD PRESSURE: 134 MMHG

## 2021-11-18 DIAGNOSIS — M25.562 ACUTE PAIN OF LEFT KNEE: Primary | ICD-10-CM

## 2021-11-18 PROCEDURE — 73562 X-RAY EXAM OF KNEE 3: CPT

## 2021-11-18 PROCEDURE — 74011250637 HC RX REV CODE- 250/637: Performed by: NURSE PRACTITIONER

## 2021-11-18 PROCEDURE — 99284 EMERGENCY DEPT VISIT MOD MDM: CPT

## 2021-11-18 RX ORDER — HYDROCODONE BITARTRATE AND ACETAMINOPHEN 7.5; 325 MG/1; MG/1
1 TABLET ORAL
Qty: 20 TABLET | Refills: 0 | Status: SHIPPED | OUTPATIENT
Start: 2021-11-18 | End: 2021-11-25

## 2021-11-18 RX ORDER — DICLOFENAC SODIUM 75 MG/1
75 TABLET, DELAYED RELEASE ORAL
Qty: 30 TABLET | Refills: 0 | Status: SHIPPED | OUTPATIENT
Start: 2021-11-18 | End: 2021-12-03

## 2021-11-18 RX ORDER — NAPROXEN 250 MG/1
500 TABLET ORAL
Status: COMPLETED | OUTPATIENT
Start: 2021-11-18 | End: 2021-11-18

## 2021-11-18 RX ORDER — OXYCODONE AND ACETAMINOPHEN 5; 325 MG/1; MG/1
1 TABLET ORAL
Status: COMPLETED | OUTPATIENT
Start: 2021-11-18 | End: 2021-11-18

## 2021-11-18 RX ADMIN — OXYCODONE AND ACETAMINOPHEN 1 TABLET: 5; 325 TABLET ORAL at 20:36

## 2021-11-18 RX ADMIN — NAPROXEN 500 MG: 250 TABLET ORAL at 20:37

## 2021-11-19 NOTE — ED PROVIDER NOTES
46-year-old female with past medical history of anxiety, depression, and spondylosis presents to the ER today for evaluation of acute left knee pain. Patient states that she has been having a little bit of discomfort in the back of her knee for the last several days and today while she was walking she felt like she stepped awkwardly which was followed by an audible pop and giving away of her knee. She has been unable to bear weight since. She denies any numbness/tingling or pain distal to the knee. Past Medical History:   Diagnosis Date    Anxiety     AR (allergic rhinitis)     Depression     Lyme disease 2020    treated with doxy    Spondylosis        Past Surgical History:   Procedure Laterality Date    HX HYSTERECTOMY           Family History:   Problem Relation Age of Onset    Cancer Mother 80        lyphoma    Lung Disease Father     Heart Disease Father 80        a fib    Other Brother 40        Amlydosis       Social History     Socioeconomic History    Marital status:      Spouse name: Not on file    Number of children: Not on file    Years of education: Not on file    Highest education level: Not on file   Occupational History    Not on file   Tobacco Use    Smoking status: Former Smoker     Years: 30.00     Quit date: 2003     Years since quittin.2    Smokeless tobacco: Never Used   Vaping Use    Vaping Use: Never used   Substance and Sexual Activity    Alcohol use: Yes     Alcohol/week: 0.8 standard drinks     Types: 1 Glasses of wine per week     Comment: ocassional    Drug use: No    Sexual activity: Yes     Partners: Male   Other Topics Concern    Not on file   Social History Narrative    63year old   female voluntarily admitted after declaring intent to commit suicide by injecting an insulin overdose. Pt's trigger is the anniversary of her son's suicide in [place] 3 years ago.  Pt. Had son arrested for stealing her jewelry to sell for drug money. 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 She sees DR Durga Fountain once every three months for psychiatric med mnagement. She has been resistant to any medication changes proposed. She has been on Well butrin 150 mg po tid for \"years\". Pt. Sees a therapist in [place] \"PRN\". She does not participate in therapy, grif groups, or perez groups on any regular basis. Social Determinants of Health     Financial Resource Strain:     Difficulty of Paying Living Expenses: Not on file   Food Insecurity:     Worried About Running Out of Food in the Last Year: Not on file    [place] of Food in the Last Year: Not on file   Transportation Needs:     Lack of Transportation (Medical): Not on file    Lack of Transportation (Non-Medical): Not on file   Physical Activity:     Days of Exercise per Week: Not on file    Minutes of Exercise per Session: Not on file   Stress:     Feeling of Stress : Not on file   Social Connections:     Frequency of Communication with Friends and Family: Not on file    Frequency of Social Gatherings with Friends and Family: Not on file    Attends Alevism Services: Not on file    Active Member of [address] [place] [address] [employer] or Organizations: Not on file    Attends Club or Organization Meetings: Not on file    Marital Status: Not on file   Intimate Partner Violence:     Fear of Current or Ex-Partner: Not on file    Emotionally Abused: Not on file    Physically Abused: Not on file    Sexually Abused: Not on file   Housing Stability:     Unable to Pay for Housing in the Last Year: Not on file    Number of [place] in the Last Year: Not on file    Unstable Housing in the Last Year: Not on file         ALLERGIES: Patient has no known allergies. Review of Systems   Constitutional: Negative for fever. HENT: Negative for sore throat. Eyes: Negative for visual disturbance. Respiratory: Negative for shortness of breath. Cardiovascular: Negative for palpitations. Gastrointestinal: Negative for vomiting. Genitourinary: Negative for dysuria. Musculoskeletal: Positive for arthralgias. Skin: Negative for rash. Neurological: Negative for dizziness. Psychiatric/Behavioral: Negative for dysphoric mood. Vitals:    11/18/21 1927   BP: (!) 159/90   Pulse: 95   Resp: 16   Temp: 97.9 °F (36.6 °C)   SpO2: 99%   Weight: 86.2 kg (190 lb)   Height: 5' 2\" (1.575 m)            Physical Exam  Vitals and nursing note reviewed. Constitutional:       General: She is in acute distress. Appearance: Normal appearance. She is not ill-appearing. HENT:      Head: Normocephalic. Nose: Nose normal.   Eyes:      General: No scleral icterus. Extraocular Movements: Extraocular movements intact. Cardiovascular:      Rate and Rhythm: Normal rate and regular rhythm. Pulmonary:      Effort: Pulmonary effort is normal.   Abdominal:      General: There is no distension. Tenderness: There is no guarding. Musculoskeletal:      Cervical back: Normal range of motion and neck supple. Left knee: Bony tenderness present. No deformity. Decreased range of motion. Tenderness present. Comments: Difficulty assessing for laxity due to degree of pain. Knee joint appears nonswollen, not erythematous, and not warm to palpation   Skin:     General: Skin is warm and dry. Neurological:      Mental Status: She is alert and oriented to person, place, and time. Mental status is at baseline. Psychiatric:         Mood and Affect: Mood normal.         Behavior: Behavior normal.         Thought Content: Thought content normal.         Judgment: Judgment normal.          MDM      VITAL SIGNS:  Patient Vitals for the past 4 hrs:   Temp Pulse Resp BP SpO2   11/18/21 1927 97.9 °F (36.6 °C) 95 16 (!) 159/90 99 %         LABS:  No results found for this or any previous visit (from the past 6 hour(s)). IMAGING:  XR KNEE LT 3 V   Final Result   No acute fracture or dislocation. Medications During Visit:  Medications   oxyCODONE-acetaminophen (PERCOCET) 5-325 mg per tablet 1 Tablet (1 Tablet Oral Given 11/18/21 2036)   naproxen (NAPROSYN) tablet 500 mg (500 mg Oral Given 11/18/21 2037)         DECISION MAKING:  Juice Moreira is a 78 y.o. female who comes in as above. Normal x-ray. Plan:  Knee immobilizer  Crutches with nonweightbearing progressing to partial weightbearing as tolerated  NSAIDs and hydrocodone for pain control. Ortho f/u. The clinical decision making for this encounter included ordering and interpreting the above diagnostic tests with comparison to prior studies that are within our EMR. Past medical and surgical histories were reviewed, as were records from recent outpatient and emergency department visits. The above results discussed and reviewed with the patient. Patient verbalized understanding of the care plan, including any changes to current outpatient medication regimen, discussed disease process, symptom control, and follow-up care. Return precautions reviewed. IMPRESSION:  1. Acute pain of left knee        DISPOSITION:  Discharged      Current Discharge Medication List      START taking these medications    Details   HYDROcodone-acetaminophen (Norco) 7.5-325 mg per tablet Take 1 Tablet by mouth every six (6) hours as needed for Pain for up to 7 days. Max Daily Amount: 4 Tablets. Qty: 20 Tablet, Refills: 0  Start date: 11/18/2021, End date: 11/25/2021    Associated Diagnoses: Acute pain of left knee      diclofenac EC (VOLTAREN) 75 mg EC tablet Take 1 Tablet by mouth two (2) times daily as needed for Pain for up to 15 days.   Qty: 30 Tablet, Refills: 0  Start date: 11/18/2021, End date: 12/3/2021              Follow-up Information     Follow up With Specialties Details Why Contact Info    Manoj Ocasio MD Orthopedic Surgery Schedule an appointment as soon as possible for a visit   [address] [id number] [facility] [id number] [phone]              The patient is asked to follow-up with their primary care provider in the next several days. They are to call tomorrow for an appointment. The patient is asked to return promptly for any increased concerns or worsening of symptoms. They can return to this emergency department or any other emergency department.     Procedures

## 2021-11-19 NOTE — ED NOTES
Pt received discharge papers and verbalized understanding. She left via wheelchair in no acute distress.

## 2021-12-12 DIAGNOSIS — E78.2 MIXED HYPERLIPIDEMIA: ICD-10-CM

## 2021-12-13 RX ORDER — ATORVASTATIN CALCIUM 20 MG/1
TABLET, FILM COATED ORAL
Qty: 90 TABLET | Refills: 0 | Status: SHIPPED | OUTPATIENT
Start: 2021-12-13 | End: 2022-03-09

## 2021-12-23 DIAGNOSIS — M17.11 PRIMARY OSTEOARTHRITIS OF RIGHT KNEE: ICD-10-CM

## 2021-12-28 RX ORDER — MELOXICAM 15 MG/1
TABLET ORAL
Qty: 90 TABLET | Refills: 1 | Status: SHIPPED | OUTPATIENT
Start: 2021-12-28 | End: 2022-06-20

## 2022-03-08 DIAGNOSIS — E78.2 MIXED HYPERLIPIDEMIA: ICD-10-CM

## 2022-03-09 RX ORDER — ATORVASTATIN CALCIUM 20 MG/1
TABLET, FILM COATED ORAL
Qty: 90 TABLET | Refills: 0 | Status: SHIPPED | OUTPATIENT
Start: 2022-03-09 | End: 2022-06-09

## 2022-03-18 PROBLEM — F33.8 SEASONAL AFFECTIVE DISORDER (HCC): Status: ACTIVE | Noted: 2017-01-23

## 2022-03-18 PROBLEM — M17.11 PRIMARY OSTEOARTHRITIS OF RIGHT KNEE: Status: ACTIVE | Noted: 2017-01-23

## 2022-03-19 PROBLEM — E66.01 SEVERE OBESITY (HCC): Status: ACTIVE | Noted: 2019-09-04

## 2022-03-20 PROBLEM — E78.2 MIXED HYPERLIPIDEMIA: Status: ACTIVE | Noted: 2017-05-08

## 2022-03-20 PROBLEM — M25.562 ACUTE PAIN OF LEFT KNEE: Status: ACTIVE | Noted: 2021-11-18

## 2022-04-12 ENCOUNTER — OFFICE VISIT (OUTPATIENT)
Dept: FAMILY MEDICINE CLINIC | Age: 72
End: 2022-04-12
Payer: MEDICARE

## 2022-04-12 VITALS
RESPIRATION RATE: 16 BRPM | TEMPERATURE: 97.7 F | WEIGHT: 192 LBS | DIASTOLIC BLOOD PRESSURE: 72 MMHG | BODY MASS INDEX: 35.33 KG/M2 | HEIGHT: 62 IN | HEART RATE: 75 BPM | OXYGEN SATURATION: 97 % | SYSTOLIC BLOOD PRESSURE: 130 MMHG

## 2022-04-12 DIAGNOSIS — F33.8 SEASONAL AFFECTIVE DISORDER (HCC): Primary | ICD-10-CM

## 2022-04-12 DIAGNOSIS — R06.02 SOB (SHORTNESS OF BREATH): ICD-10-CM

## 2022-04-12 DIAGNOSIS — E66.01 SEVERE OBESITY (BMI 35.0-39.9) WITH COMORBIDITY (HCC): ICD-10-CM

## 2022-04-12 DIAGNOSIS — E78.2 MIXED HYPERLIPIDEMIA: ICD-10-CM

## 2022-04-12 DIAGNOSIS — R73.09 ELEVATED GLUCOSE: ICD-10-CM

## 2022-04-12 DIAGNOSIS — E66.9 OBESITY, CLASS I, BMI 30-34.9: ICD-10-CM

## 2022-04-12 PROCEDURE — G8427 DOCREV CUR MEDS BY ELIG CLIN: HCPCS | Performed by: FAMILY MEDICINE

## 2022-04-12 PROCEDURE — 3017F COLORECTAL CA SCREEN DOC REV: CPT | Performed by: FAMILY MEDICINE

## 2022-04-12 PROCEDURE — 1090F PRES/ABSN URINE INCON ASSESS: CPT | Performed by: FAMILY MEDICINE

## 2022-04-12 PROCEDURE — G8536 NO DOC ELDER MAL SCRN: HCPCS | Performed by: FAMILY MEDICINE

## 2022-04-12 PROCEDURE — G8417 CALC BMI ABV UP PARAM F/U: HCPCS | Performed by: FAMILY MEDICINE

## 2022-04-12 PROCEDURE — G0463 HOSPITAL OUTPT CLINIC VISIT: HCPCS | Performed by: FAMILY MEDICINE

## 2022-04-12 PROCEDURE — G8399 PT W/DXA RESULTS DOCUMENT: HCPCS | Performed by: FAMILY MEDICINE

## 2022-04-12 PROCEDURE — G9899 SCRN MAM PERF RSLTS DOC: HCPCS | Performed by: FAMILY MEDICINE

## 2022-04-12 PROCEDURE — G9717 DOC PT DX DEP/BP F/U NT REQ: HCPCS | Performed by: FAMILY MEDICINE

## 2022-04-12 PROCEDURE — 1101F PT FALLS ASSESS-DOCD LE1/YR: CPT | Performed by: FAMILY MEDICINE

## 2022-04-12 PROCEDURE — 99214 OFFICE O/P EST MOD 30 MIN: CPT | Performed by: FAMILY MEDICINE

## 2022-04-12 NOTE — PROGRESS NOTES
Chief Complaint   Patient presents with    Anxiety    Depression    Cholesterol Problem     1. \"Have you been to the ER, urgent care clinic since your last visit? Hospitalized since your last visit? \" yes -[facility] fall    2. \"Have you seen or consulted any other health care providers outside of the [address] [place] [address] since your last visit? \"  Ortho     3. For patients aged 39-70: Has the patient had a colonoscopy / FIT/ Cologuard? 2018      If the patient is female:    4. For patients aged 41-77: Has the patient had a mammogram within the past 2 years? 04.11.2022      5. For patients aged 21-65: Has the patient had a pap smear?    Been a couple of years

## 2022-04-12 NOTE — PROGRESS NOTES
HPI  Cornelius Ingram 73 y.o. female  presents to the office today for a follow up on cholesterol, anxiety, and depression. Pt is not fasting today (had banana and protein shake this morning). Seasonal Affect Disorder: Pt reports recent political issues have caused her significant upset lately. She is tearful in office talking about it. Hyperlipidemia: Lipid panel on 10/12/21 notable for total cholesterol 189, HDL 72, LDL 75, and triglycerides 210. Pt continues with atorvastatin 20mg. Elevated Glucose: Last A1c was 5.6 on 10/12/21. SOB: She reports occasional SOB, typically with exertion. Pt denies headaches, dizziness, CP, or urinary issues. She also has some swelling in hands occasionally. She denies swelling in legs. Obesity: BMI today is 35. 12.     Pt is due for second shingles vaccine-- she will schedule at pharmacy. Blood pressure 130/72, pulse 75, temperature 97.7 °F (36.5 °C), temperature source Temporal, resp. rate 16, height 5' 2\" (1.575 m), weight 192 lb (87.1 kg), SpO2 97 %. Body mass index is 35.12 kg/m². Chief Complaint   Patient presents with    Anxiety    Depression    Cholesterol Problem        Current Outpatient Medications   Medication Sig Dispense Refill    atorvastatin (LIPITOR) 20 mg tablet TAKE 1 TABLET BY MOUTH EVERY DAY 90 Tablet 0    meloxicam (MOBIC) 15 mg tablet TAKE 1 TABLET BY MOUTH EVERY DAY 90 Tablet 1    magnesium 250 mg tab Take  by mouth.  calcium-cholecalciferol, d3, (CALCIUM 600 + D) 600-125 mg-unit tab Take  by mouth.  multivitamin (ONE A DAY) tablet Take 1 Tablet by mouth daily.  omega 3-dha-epa-fish oil (Fish OiL) 100-160-1,000 mg cap Take  by mouth.  estradioL (ESTRACE) 0.01 % (0.1 mg/gram) vaginal cream INSERT 1 GRAM VAGINALLY TWICE WEEKLY      albuterol (PROVENTIL HFA, VENTOLIN HFA, PROAIR HFA) 90 mcg/actuation inhaler Take 2 Puffs by inhalation every six (6) hours as needed for Wheezing.  1 Inhaler 0    escitalopram oxalate (LEXAPRO) 20 mg tablet Take 1 Tab by mouth daily. Indications: MAJOR DEPRESSIVE DISORDER (Patient taking differently: Take 10 mg by mouth daily. Indications: major depressive disorder) 30 Tab 0    busPIRone (BUSPAR) 10 mg tablet Take 1 Tab by mouth two (2) times a day. Indications: GENERALIZED ANXIETY DISORDER 60 Tab 1    FEXOFENADINE/PSEUDOEPHEDRINE (ALLEGRA-D 12 HOUR PO) Take 1 Tab by mouth daily as needed (Allergies). Indications: SEASONAL ALLERGIES      ondansetron (Zofran ODT) 4 mg disintegrating tablet Take 1 Tab by mouth every eight (8) hours as needed for Nausea. (Patient not taking: Reported on 2022) 10 Tab 0     No Known Allergies  Past Medical History:   Diagnosis Date    Anxiety     AR (allergic rhinitis)     Depression     Lyme disease 2020    treated with doxy    Spondylosis      Past Surgical History:   Procedure Laterality Date    HX HYSTERECTOMY       Family History   Problem Relation Age of Onset    Cancer Mother 80        lyphoma    Lung Disease Father     Heart Disease Father 80        a fib    Other Brother 40        Amlydosis     Social History     Tobacco Use    Smoking status: Former Smoker     Years: 30.00     Quit date: 2003     Years since quittin.6    Smokeless tobacco: Never Used   Substance Use Topics    Alcohol use: Yes     Alcohol/week: 0.8 standard drinks     Types: 1 Glasses of wine per week     Comment: ocassional        Review of Systems   Constitutional: Negative for chills and fever. HENT: Negative for hearing loss and tinnitus. Eyes: Negative for blurred vision and double vision. Respiratory: Positive for shortness of breath. Negative for cough. Cardiovascular: Negative for chest pain, palpitations and leg swelling. Gastrointestinal: Negative for nausea and vomiting. Genitourinary: Negative for dysuria and frequency. Musculoskeletal: Negative for back pain and falls. Skin: Negative for itching and rash.    Neurological: Negative for dizziness, loss of consciousness and headaches. Endo/Heme/Allergies: Negative. Psychiatric/Behavioral: Negative for depression. The patient is not nervous/anxious. Physical Exam  Vitals and nursing note reviewed. Constitutional:       General: She is not in acute distress. Appearance: She is well-developed. She is not diaphoretic. HENT:      Head: Normocephalic and atraumatic. Cardiovascular:      Rate and Rhythm: Normal rate and regular rhythm. Pulses:           Carotid pulses are 2+ on the right side and 2+ on the left side. Heart sounds: Normal heart sounds. No murmur heard. No friction rub. No gallop. Pulmonary:      Effort: Pulmonary effort is normal. No respiratory distress. Breath sounds: Normal breath sounds. No wheezing or rales. Chest:      Chest wall: No tenderness. Abdominal:      General: Bowel sounds are normal. There is no distension. Palpations: Abdomen is soft. Tenderness: There is no abdominal tenderness. Musculoskeletal:         General: Normal range of motion. Cervical back: Normal range of motion and neck supple. Neurological:      Mental Status: She is alert and oriented to person, place, and time. Psychiatric:         Behavior: Behavior normal.         Thought Content: Thought content normal.         Judgment: Judgment normal.           ASSESSMENT and PLAN  Diagnoses and all orders for this visit:    1. Seasonal affective disorder [facility])  Assessment & Plan:   well controlled, continue current treatment plan    2. Severe obesity (BMI 35.0-39. 9) with comorbidity ([address]  I have reviewed/discussed the above normal BMI with the patient. I have recommended the following interventions: dietary management education, guidance, and counseling, encourage exercise and monitor weight. 3. Mixed hyperlipidemia  Lipid panel on 10/12/21 notable for total cholesterol 189, HDL 72, LDL 75, and triglycerides 210.  Pt continues with atorvastatin 20mg. Pt will have updated lab work performed during today's [address]; Future  -     LIPID PANEL; Future    4. Elevated glucose  Last A1c was 5.6 on 10/12/21. Recheck A1c.   -     HEMOGLOBIN A1C WITH EAG; Future    5. Obesity, Class I, BMI 30-34.9  See # 2.     6. SOB (shortness of breath)  Pt having some SOB. She will have chest x-ray for further evaluation. I also recommended she have an echocardiogram. Pt will have updated lab work performed during today's OV.   -     ECHO ADULT COMPLETE; Future  -     XR CHEST PA LAT; Future  -     NT-PRO BNP; Future      Follow-up and Dispositions    · Return in about 6 months (around 10/12/2022) for follow up. Medication risks/benefits/costs/interactions/alternatives discussed with patient. Advised patient to call back or return to office if symptoms worsen/change/persist.  If patient cannot reach us or should anything more severe/urgent arise he/she should proceed directly to the nearest emergency department. Discussed expected course/resolution/complications of diagnosis in detail with patient. Patient given a written after visit summary which includes diagnoses, current medications and vitals. Patient expressed understanding with the diagnosis and plan. Written by khari Lambert, as dictated by Antonette Scott M.D.    11:23 AM - 12:02 PM    Total time spent with the patient 15 minutes, greater than 50% of time spent counseling patient.

## 2022-04-13 LAB
ALBUMIN SERPL-MCNC: 4.1 G/DL (ref 3.5–5)
ALBUMIN/GLOB SERPL: 1.2 {RATIO} (ref 1.1–2.2)
ALP SERPL-CCNC: 102 U/L (ref 45–117)
ALT SERPL-CCNC: 38 U/L (ref 12–78)
ANION GAP SERPL CALC-SCNC: 7 MMOL/L (ref 5–15)
AST SERPL-CCNC: 24 U/L (ref 15–37)
BILIRUB SERPL-MCNC: 0.5 MG/DL (ref 0.2–1)
BNP SERPL-MCNC: 36 PG/ML
BUN SERPL-MCNC: 40 MG/DL (ref 6–20)
BUN/CREAT SERPL: 32 (ref 12–20)
CALCIUM SERPL-MCNC: 9.8 MG/DL (ref 8.5–10.1)
CHLORIDE SERPL-SCNC: 106 MMOL/L (ref 97–108)
CHOLEST SERPL-MCNC: 205 MG/DL
CO2 SERPL-SCNC: 24 MMOL/L (ref 21–32)
CREAT SERPL-MCNC: 1.24 MG/DL (ref 0.55–1.02)
EST. AVERAGE GLUCOSE BLD GHB EST-MCNC: 108 MG/DL
GLOBULIN SER CALC-MCNC: 3.5 G/DL (ref 2–4)
GLUCOSE SERPL-MCNC: 82 MG/DL (ref 65–100)
HBA1C MFR BLD: 5.4 % (ref 4–5.6)
HDLC SERPL-MCNC: 75 MG/DL
HDLC SERPL: 2.7 {RATIO} (ref 0–5)
LDLC SERPL CALC-MCNC: 101 MG/DL (ref 0–100)
POTASSIUM SERPL-SCNC: 5.1 MMOL/L (ref 3.5–5.1)
PROT SERPL-MCNC: 7.6 G/DL (ref 6.4–8.2)
SODIUM SERPL-SCNC: 137 MMOL/L (ref 136–145)
TRIGL SERPL-MCNC: 145 MG/DL (ref ?–150)
VLDLC SERPL CALC-MCNC: 29 MG/DL

## 2022-04-21 ENCOUNTER — HOSPITAL ENCOUNTER (OUTPATIENT)
Dept: NON INVASIVE DIAGNOSTICS | Age: 72
Discharge: HOME | End: 2022-04-21
Attending: FAMILY MEDICINE
Payer: MEDICARE

## 2022-04-21 VITALS
DIASTOLIC BLOOD PRESSURE: 72 MMHG | SYSTOLIC BLOOD PRESSURE: 130 MMHG | HEIGHT: 62 IN | WEIGHT: 192.02 LBS | BODY MASS INDEX: 35.34 KG/M2

## 2022-04-21 DIAGNOSIS — R06.02 SOB (SHORTNESS OF BREATH): ICD-10-CM

## 2022-04-21 PROCEDURE — 93306 TTE W/DOPPLER COMPLETE: CPT

## 2022-04-22 LAB
ECHO AO ROOT DIAM: 3 CM
ECHO AO ROOT INDEX: 1.6 CM/M2
ECHO AV MEAN GRADIENT: 4 MMHG
ECHO AV MEAN VELOCITY: 0.9 M/S
ECHO AV PEAK GRADIENT: 7 MMHG
ECHO AV PEAK VELOCITY: 1.3 M/S
ECHO AV VELOCITY RATIO: 0.77
ECHO AV VTI: 22.9 CM
ECHO EST RA PRESSURE: 3 MMHG
ECHO LA DIAMETER INDEX: 1.49 CM/M2
ECHO LA DIAMETER: 2.8 CM
ECHO LA TO AORTIC ROOT RATIO: 0.93
ECHO LV E' LATERAL VELOCITY: 6 CM/S
ECHO LV E' SEPTAL VELOCITY: 5 CM/S
ECHO LV FRACTIONAL SHORTENING: 38 % (ref 28–44)
ECHO LV INTERNAL DIMENSION DIASTOLE INDEX: 2.55 CM/M2
ECHO LV INTERNAL DIMENSION DIASTOLIC: 4.8 CM (ref 3.9–5.3)
ECHO LV INTERNAL DIMENSION SYSTOLIC INDEX: 1.6 CM/M2
ECHO LV INTERNAL DIMENSION SYSTOLIC: 3 CM
ECHO LV IVSD: 0.6 CM (ref 0.6–0.9)
ECHO LV MASS 2D: 106.9 G (ref 67–162)
ECHO LV MASS INDEX 2D: 56.8 G/M2 (ref 43–95)
ECHO LV POSTERIOR WALL DIASTOLIC: 0.8 CM (ref 0.6–0.9)
ECHO LV RELATIVE WALL THICKNESS RATIO: 0.33
ECHO LVOT AV VTI INDEX: 0.85
ECHO LVOT MEAN GRADIENT: 2 MMHG
ECHO LVOT PEAK GRADIENT: 4 MMHG
ECHO LVOT PEAK VELOCITY: 1 M/S
ECHO LVOT VTI: 19.4 CM
ECHO MV A VELOCITY: 0.93 M/S
ECHO MV E DECELERATION TIME (DT): 290.5 MS
ECHO MV E VELOCITY: 0.57 M/S
ECHO MV E/A RATIO: 0.61
ECHO MV E/E' LATERAL: 9.5
ECHO MV E/E' RATIO (AVERAGED): 10.45
ECHO MV E/E' SEPTAL: 11.4
ECHO RV TAPSE: 1.5 CM (ref 1.7–?)

## 2022-04-22 PROCEDURE — 93306 TTE W/DOPPLER COMPLETE: CPT | Performed by: SPECIALIST

## 2022-05-21 DIAGNOSIS — R93.1 ABNORMAL ECHOCARDIOGRAM: ICD-10-CM

## 2022-05-21 DIAGNOSIS — E78.2 MIXED HYPERLIPIDEMIA: Primary | ICD-10-CM

## 2022-05-21 NOTE — PROGRESS NOTES
Mrs. Damon Sheehan,  With the exception of a slightly elevated renal function the rest of your labs are appearing stable. I like to check recheck your renal function this month and please be well-hydrated. If you can call upper office and make a lab appointment or if he can go to the short pump draw center at [facility].  What ever that is convenient for him. If you have any questions let me know

## 2022-05-21 NOTE — PROGRESS NOTES
. Grace Arguelles,  Just wanted to reach out to you and let you know that we got the echo and echo shows a normal ejection fraction of 55-60% the only issue that I noticed in the right ventricle was just a mildly reduced systolic function and , who read the echo mentioned that there was an abnormal tricuspid annular plane systolic excursion also known as a tricuspid annular motion into displacement of the tricuspid valve point so I think it be a good idea that we get Dr. Prado Offer to evaluate this I Ramses Novak put in the referral for you to see his office phone number is area code [phone]. Please call up and set that appointment.

## 2022-06-08 ENCOUNTER — TELEPHONE (OUTPATIENT)
Dept: CARDIOLOGY CLINIC | Age: 72
End: 2022-06-08

## 2022-06-08 DIAGNOSIS — E78.2 MIXED HYPERLIPIDEMIA: ICD-10-CM

## 2022-06-09 RX ORDER — ATORVASTATIN CALCIUM 20 MG/1
TABLET, FILM COATED ORAL
Qty: 90 TABLET | Refills: 0 | Status: SHIPPED | OUTPATIENT
Start: 2022-06-09

## 2022-09-16 DIAGNOSIS — M17.11 PRIMARY OSTEOARTHRITIS OF RIGHT KNEE: ICD-10-CM

## 2022-09-18 RX ORDER — MELOXICAM 15 MG/1
TABLET ORAL
Qty: 90 TABLET | Refills: 0 | Status: SHIPPED | OUTPATIENT
Start: 2022-09-18

## 2022-11-08 ENCOUNTER — OFFICE VISIT (OUTPATIENT)
Dept: FAMILY MEDICINE CLINIC | Age: 72
End: 2022-11-08
Payer: MEDICARE

## 2022-11-08 VITALS
HEIGHT: 62 IN | BODY MASS INDEX: 34.96 KG/M2 | DIASTOLIC BLOOD PRESSURE: 76 MMHG | TEMPERATURE: 98.1 F | OXYGEN SATURATION: 97 % | SYSTOLIC BLOOD PRESSURE: 122 MMHG | HEART RATE: 80 BPM | RESPIRATION RATE: 16 BRPM | WEIGHT: 190 LBS

## 2022-11-08 DIAGNOSIS — E78.2 MIXED HYPERLIPIDEMIA: Primary | ICD-10-CM

## 2022-11-08 DIAGNOSIS — E66.01 SEVERE OBESITY (BMI 35.0-39.9) WITH COMORBIDITY (HCC): ICD-10-CM

## 2022-11-08 DIAGNOSIS — N28.9 ABNORMAL KIDNEY FUNCTION: ICD-10-CM

## 2022-11-08 DIAGNOSIS — R73.09 ELEVATED GLUCOSE: ICD-10-CM

## 2022-11-08 DIAGNOSIS — Z00.00 ENCOUNTER FOR MEDICARE ANNUAL WELLNESS EXAM: ICD-10-CM

## 2022-11-08 PROCEDURE — G0463 HOSPITAL OUTPT CLINIC VISIT: HCPCS | Performed by: FAMILY MEDICINE

## 2022-11-08 PROCEDURE — 1123F ACP DISCUSS/DSCN MKR DOCD: CPT | Performed by: FAMILY MEDICINE

## 2022-11-08 PROCEDURE — 99214 OFFICE O/P EST MOD 30 MIN: CPT | Performed by: FAMILY MEDICINE

## 2022-11-08 PROCEDURE — G8536 NO DOC ELDER MAL SCRN: HCPCS | Performed by: FAMILY MEDICINE

## 2022-11-08 PROCEDURE — 1090F PRES/ABSN URINE INCON ASSESS: CPT | Performed by: FAMILY MEDICINE

## 2022-11-08 PROCEDURE — G0439 PPPS, SUBSEQ VISIT: HCPCS | Performed by: FAMILY MEDICINE

## 2022-11-08 PROCEDURE — G9717 DOC PT DX DEP/BP F/U NT REQ: HCPCS | Performed by: FAMILY MEDICINE

## 2022-11-08 PROCEDURE — 1101F PT FALLS ASSESS-DOCD LE1/YR: CPT | Performed by: FAMILY MEDICINE

## 2022-11-08 PROCEDURE — G9899 SCRN MAM PERF RSLTS DOC: HCPCS | Performed by: FAMILY MEDICINE

## 2022-11-08 PROCEDURE — G8417 CALC BMI ABV UP PARAM F/U: HCPCS | Performed by: FAMILY MEDICINE

## 2022-11-08 PROCEDURE — G8399 PT W/DXA RESULTS DOCUMENT: HCPCS | Performed by: FAMILY MEDICINE

## 2022-11-08 PROCEDURE — G8427 DOCREV CUR MEDS BY ELIG CLIN: HCPCS | Performed by: FAMILY MEDICINE

## 2022-11-08 PROCEDURE — 3017F COLORECTAL CA SCREEN DOC REV: CPT | Performed by: FAMILY MEDICINE

## 2022-11-08 RX ORDER — BUPROPION HYDROCHLORIDE 300 MG/1
300 TABLET ORAL DAILY
COMMUNITY

## 2022-11-08 NOTE — PROGRESS NOTES
[facility]  Katelynn Tello 73 y.o. female  presents to the office today for follow up on chronic conditions and for AWV. Blood pressure 122/76, pulse 80, temperature 98.1 °F (36.7 °C), temperature source Temporal, resp. rate 16, height 5' 2\" (1.575 m), weight 190 lb (86.2 kg), SpO2 97 %. Body mass index is 34.75 kg/m². Chief Complaint   Patient presents with    Annual Wellness Visit    Anxiety    Cholesterol Problem    Depression    Bereavement Counseling      Hyperlipidemia: Lipid panel on 4/12/22 notable for total cholesterol 205, HDL 75, , and triglycerides 145. Pt continues with Lipitor 20mg daily. She has been out of Lipitor for a week or so. Elevated glucose: A1c was 5.4 on 4/12/22. Health Maintenance:   Pt due for second shingles vaccine-- I reminded her to get it. Pt due for new COVID booster. She had mammogram in April 2022 (gets these annually). Current Outpatient Medications   Medication Sig Dispense Refill    buPROPion XL (Wellbutrin XL) 300 mg XL tablet Take 300 mg by mouth daily. meloxicam (MOBIC) 15 mg tablet TAKE 1 TABLET BY MOUTH EVERY DAY 90 Tablet 0    atorvastatin (LIPITOR) 20 mg tablet TAKE 1 TABLET BY MOUTH EVERY DAY 90 Tablet 0    magnesium 250 mg tab Take  by mouth.      calcium-cholecalciferol, d3, (CALCIUM 600 + D) 600-125 mg-unit tab Take  by mouth.      multivitamin (ONE A DAY) tablet Take 1 Tablet by mouth daily. omega 3-dha-epa-fish oil (Fish OiL) 100-160-1,000 mg cap Take  by mouth. ondansetron (Zofran ODT) 4 mg disintegrating tablet Take 1 Tab by mouth every eight (8) hours as needed for Nausea. 10 Tab 0    estradioL (ESTRACE) 0.01 % (0.1 mg/gram) vaginal cream INSERT 1 GRAM VAGINALLY TWICE WEEKLY      albuterol (PROVENTIL HFA, VENTOLIN HFA, PROAIR HFA) 90 mcg/actuation inhaler Take 2 Puffs by inhalation every six (6) hours as needed for Wheezing. 1 Inhaler 0    escitalopram oxalate (LEXAPRO) 20 mg tablet Take 1 Tab by mouth daily.  Indications: MAJOR DEPRESSIVE DISORDER (Patient taking differently: Take 10 mg by mouth daily. Indications: major depressive disorder) 30 Tab 0    busPIRone (BUSPAR) 10 mg tablet Take 1 Tab by mouth two (2) times a day. Indications: GENERALIZED ANXIETY DISORDER 60 Tab 1    FEXOFENADINE/PSEUDOEPHEDRINE (ALLEGRA-D 12 HOUR PO) Take 1 Tab by mouth daily as needed (Allergies). Indications: SEASONAL ALLERGIES       No Known Allergies  Past Medical History:   Diagnosis Date    Anxiety     AR (allergic rhinitis)     Depression     Lyme disease 2020    treated with doxy    Spondylosis      Past Surgical History:   Procedure Laterality Date    HX HYSTERECTOMY       Family History   Problem Relation Age of Onset    Cancer Mother 80        lyphoma    Lung Disease Father     Heart Disease Father 80        a fib    Other Brother 40        Amlydosis     Social History     Tobacco Use    Smoking status: Former     Years: 30.00     Types: Cigarettes     Quit date: 2003     Years since quittin.1    Smokeless tobacco: Never   Substance Use Topics    Alcohol use: Yes     Alcohol/week: 0.8 standard drinks     Types: 1 Glasses of wine per week     Comment: ocassional        Review of Systems   Constitutional:  Negative for chills and fever. HENT:  Negative for hearing loss and tinnitus. Eyes:  Negative for blurred vision and double vision. Respiratory:  Negative for cough and shortness of breath. Cardiovascular:  Negative for chest pain and palpitations. Gastrointestinal:  Negative for nausea and vomiting. Genitourinary:  Negative for dysuria and frequency. Musculoskeletal:  Negative for back pain and falls. Skin:  Negative for itching and rash. Neurological:  Negative for dizziness, loss of consciousness and headaches. Endo/Heme/Allergies: Negative. Psychiatric/Behavioral:  Negative for depression. The patient is not nervous/anxious. Physical Exam  Vitals and nursing note reviewed.    Constitutional: General: She is not in acute distress. Appearance: She is well-developed. She is not diaphoretic. HENT:      Head: Normocephalic and atraumatic. Cardiovascular:      Rate and Rhythm: Normal rate and regular rhythm. Heart sounds: Normal heart sounds. No murmur heard. No friction rub. No gallop. Pulmonary:      Effort: Pulmonary effort is normal. No respiratory distress. Breath sounds: Normal breath sounds. No wheezing or rales. Chest:      Chest wall: No tenderness. Abdominal:      General: Bowel sounds are normal. There is no distension. Palpations: Abdomen is soft. Tenderness: There is no abdominal tenderness. Musculoskeletal:         General: Normal range of motion. Cervical back: Normal range of motion and neck supple. Neurological:      Mental Status: She is alert and oriented to person, place, and time. Psychiatric:         Behavior: Behavior normal.         Thought Content: Thought content normal.         Judgment: Judgment normal.         ASSESSMENT and PLAN  Diagnoses and all orders for this visit:    Pt recently lost several loved ones. She does not want to get labs checked today because she reports her diet has not been good lately. Will check labs in 3 months. 1. Mixed hyperlipidemia  Continue current regimen. Recheck CMP and lipid panel in 3 months. -     METABOLIC PANEL, COMPREHENSIVE; Future  -     LIPID PANEL; Future    2. Elevated glucose  Pt will work on her diet. Recheck CMP and A1c in 3 months. -     METABOLIC PANEL, COMPREHENSIVE; Future  -     HEMOGLOBIN A1C WITH EAG; Future    3. Severe obesity (BMI 35.0-39. 9) with comorbidity ([facility] [address]  I have reviewed/discussed the above normal BMI with the patient. I have recommended the following interventions: dietary management education, guidance, and counseling, encourage exercise and monitor weight. 4. Abnormal kidney function  Recheck CMP in 3 months.    -     METABOLIC PANEL, COMPREHENSIVE; Future    5. Encounter for Medicare annual wellness exam  Medicare questionnaire discussed and responses reviewed today in office. Follow-up and Dispositions    Return in about 5 months (around 4/8/2023) for follow up. Medication risks/benefits/costs/interactions/alternatives discussed with patient. Advised patient to call back or return to office if symptoms worsen/change/persist.  If patient cannot reach us or should anything more severe/urgent arise he/she should proceed directly to the nearest emergency department. Discussed expected course/resolution/complications of diagnosis in detail with patient. Patient given a written after visit summary which includes diagnoses, current medications and vitals. Patient expressed understanding with the diagnosis and plan. Written by khari Freeman, as dictated by Alli Martin M.D.    4:39 PM - 4:50 PM    Total time spent with the patient 20 minutes, greater than 50% of time spent counseling patient.

## 2022-11-08 NOTE — PROGRESS NOTES
Chief Complaint   Patient presents with    Annual Wellness Visit    Anxiety    Cholesterol Problem    Depression    Bereavement Counseling      X 3 Deaths in the last month    1. \"Have you been to the ER, urgent care clinic since your last visit? Hospitalized since your last visit? \" no    2. \"Have you seen or consulted any other health care providers outside of the [address] [place] [address] since your last visit? \"  no

## 2022-11-08 NOTE — PROGRESS NOTES
This is the Subsequent Medicare Annual Wellness Exam, performed 12 months or more after the Initial AWV or the last Subsequent AWV    I have reviewed the patient's medical history in detail and updated the computerized patient record. Assessment/Plan   Education and counseling provided:  Are appropriate based on today's review and evaluation         Depression Risk Factor Screening     3 most recent PHQ Screens 11/8/2022   PHQ Not Done Patient refuses   Little interest or pleasure in doing things -   Feeling down, depressed, irritable, or hopeless -   Total Score PHQ 2 -   Trouble falling or staying asleep, or sleeping too much -   Feeling tired or having little energy -   Poor appetite, weight loss, or overeating -   Feeling bad about yourself - or that you are a failure or have let yourself or your family down -   Trouble concentrating on things such as school, work, reading, or watching TV -   Moving or speaking so slowly that other people could have noticed; or the opposite being so fidgety that others notice -   Thoughts of being better off dead, or hurting yourself in some way -   PHQ 9 Score -   How difficult have these problems made it for you to do your work, take care of your home and get along with others -       Alcohol & Drug Abuse Risk Screen    Do you average more than 1 drink per night or more than 7 drinks a week:  No    On any one occasion in the past three months have you have had more than 3 drinks containing alcohol:  No          Functional Ability and Level of Safety    Hearing: The patient needs further evaluation. Activities of Daily Living: The home contains: no safety equipment. Patient does total self care      Ambulation: with no difficulty     Fall Risk:  Fall Risk Assessment, last 12 mths 11/8/2022   Able to walk? Yes   Fall in past 12 months? 0   Do you feel unsteady? 0   Are you worried about falling 0   Is TUG test greater than 12 seconds?  -   Is the gait abnormal? - Number of falls in past 12 months -   Fall with injury? -      Abuse Screen:  Patient is not abused       Cognitive Screening    Has your family/caregiver stated any concerns about your memory: no         Health Maintenance Due     Health Maintenance Due   Topic Date Due    Shingrix Vaccine Age 49> (2 of 2) 07/23/2020    COVID-19 Vaccine (4 - Booster for Pfizer series) 11/19/2021    Flu Vaccine (1) 08/01/2022    Medicare Yearly Exam  10/13/2022       Patient Care Team   Patient Care Team:  Merrill Galeano MD as PCP - Alisa Abreu MD as PCP - Gisella Jerez Provider    History     Patient Active Problem List   Diagnosis Code    Spondylosis M47.9    Anxiety F41.9    AR (allergic rhinitis) J30.9    Sleep disturbance U90.6    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 A    Acute meniscal tear of knee S83.209A    Obesity, Class I, BMI 30-34.9 E66.9    Seasonal affective disorder (HCC) F33.8    Primary osteoarthritis of right knee M17.11    Mixed hyperlipidemia E78.2    Severe obesity (HCC) E66.01    Acute pain of left knee M25.562     Past Medical History:   Diagnosis Date    Anxiety     AR (allergic rhinitis)     Depression     Lyme disease 09/2020    treated with doxy    Spondylosis       Past Surgical History:   Procedure Laterality Date    HX HYSTERECTOMY       Current Outpatient Medications   Medication Sig Dispense Refill    buPROPion XL (Wellbutrin XL) 300 mg XL tablet Take 300 mg by mouth daily. meloxicam (MOBIC) 15 mg tablet TAKE 1 TABLET BY MOUTH EVERY DAY 90 Tablet 0    atorvastatin (LIPITOR) 20 mg tablet TAKE 1 TABLET BY MOUTH EVERY DAY 90 Tablet 0    magnesium 250 mg tab Take  by mouth.      calcium-cholecalciferol, d3, (CALCIUM 600 + D) 600-125 mg-unit tab Take  by mouth.      multivitamin (ONE A DAY) tablet Take 1 Tablet by mouth daily. omega 3-dha-epa-fish oil (Fish OiL) 100-160-1,000 mg cap Take  by mouth.       ondansetron (Zofran ODT) 4 mg disintegrating tablet Take 1 Tab by mouth every eight (8) hours as needed for Nausea. 10 Tab 0    estradioL (ESTRACE) 0.01 % (0.1 mg/gram) vaginal cream INSERT 1 GRAM VAGINALLY TWICE WEEKLY      albuterol (PROVENTIL HFA, VENTOLIN HFA, PROAIR HFA) 90 mcg/actuation inhaler Take 2 Puffs by inhalation every six (6) hours as needed for Wheezing. 1 Inhaler 0    escitalopram oxalate (LEXAPRO) 20 mg tablet Take 1 Tab by mouth daily. Indications: MAJOR DEPRESSIVE DISORDER (Patient taking differently: Take 10 mg by mouth daily. Indications: major depressive disorder) 30 Tab 0    busPIRone (BUSPAR) 10 mg tablet Take 1 Tab by mouth two (2) times a day. Indications: GENERALIZED ANXIETY DISORDER 60 Tab 1    FEXOFENADINE/PSEUDOEPHEDRINE (ALLEGRA-D 12 HOUR PO) Take 1 Tab by mouth daily as needed (Allergies). Indications: SEASONAL ALLERGIES       No Known Allergies    Family History   Problem Relation Age of Onset    Cancer Mother 80        lyphoma    Lung Disease Father     Heart Disease Father 80        a fib    Other Brother 40        Amlydosis     Social History     Tobacco Use    Smoking status: Former     Years: 30.00     Types: Cigarettes     Quit date: 2003     Years since quittin.1    Smokeless tobacco: Never   Substance Use Topics    Alcohol use:  Yes     Alcohol/week: 0.8 standard drinks     Types: 1 Glasses of wine per week     Comment: steve Boyle MD

## 2022-12-13 ENCOUNTER — HOSPITAL ENCOUNTER (EMERGENCY)
Age: 72
Discharge: HOME | End: 2022-12-14
Attending: STUDENT IN AN ORGANIZED HEALTH CARE EDUCATION/TRAINING PROGRAM
Payer: MEDICARE

## 2022-12-13 DIAGNOSIS — F48.9 MENTAL HEALTH PROBLEM: Primary | ICD-10-CM

## 2022-12-13 PROCEDURE — 90791 PSYCH DIAGNOSTIC EVALUATION: CPT

## 2022-12-13 PROCEDURE — 99282 EMERGENCY DEPT VISIT SF MDM: CPT

## 2022-12-14 VITALS
BODY MASS INDEX: 34.96 KG/M2 | DIASTOLIC BLOOD PRESSURE: 76 MMHG | WEIGHT: 190 LBS | RESPIRATION RATE: 16 BRPM | HEART RATE: 89 BPM | SYSTOLIC BLOOD PRESSURE: 138 MMHG | TEMPERATURE: 98.3 F | HEIGHT: 62 IN | OXYGEN SATURATION: 97 %
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
## 2022-12-14 NOTE — BSMART NOTE
BSMART assessment completed, and suicide risk level noted to be No Risk . Primary Nurse Christel Files and Physician [address] [place] [address] notified. Concerns not observed. Security/Off- has not been notified.
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
## 2022-12-21 DIAGNOSIS — M17.11 PRIMARY OSTEOARTHRITIS OF RIGHT KNEE: ICD-10-CM

## 2022-12-21 RX ORDER — MELOXICAM 15 MG/1
TABLET ORAL
Qty: 90 TABLET | Refills: 0 | Status: SHIPPED | OUTPATIENT
Start: 2022-12-21

## 2023-01-25 ENCOUNTER — VIRTUAL VISIT (OUTPATIENT)
Dept: FAMILY MEDICINE CLINIC | Age: 73
End: 2023-01-25
Payer: MEDICARE

## 2023-01-25 DIAGNOSIS — F33.8 SEASONAL AFFECTIVE DISORDER (HCC): ICD-10-CM

## 2023-01-25 DIAGNOSIS — E78.2 MIXED HYPERLIPIDEMIA: ICD-10-CM

## 2023-01-25 DIAGNOSIS — U07.1 COVID-19: Primary | ICD-10-CM

## 2023-01-25 DIAGNOSIS — J06.9 UPPER RESPIRATORY TRACT INFECTION, UNSPECIFIED TYPE: ICD-10-CM

## 2023-01-25 DIAGNOSIS — M17.11 PRIMARY OSTEOARTHRITIS OF RIGHT KNEE: ICD-10-CM

## 2023-01-25 PROCEDURE — 1090F PRES/ABSN URINE INCON ASSESS: CPT | Performed by: FAMILY MEDICINE

## 2023-01-25 PROCEDURE — G8428 CUR MEDS NOT DOCUMENT: HCPCS | Performed by: FAMILY MEDICINE

## 2023-01-25 PROCEDURE — G8399 PT W/DXA RESULTS DOCUMENT: HCPCS | Performed by: FAMILY MEDICINE

## 2023-01-25 PROCEDURE — 1123F ACP DISCUSS/DSCN MKR DOCD: CPT | Performed by: FAMILY MEDICINE

## 2023-01-25 PROCEDURE — G8417 CALC BMI ABV UP PARAM F/U: HCPCS | Performed by: FAMILY MEDICINE

## 2023-01-25 PROCEDURE — G0463 HOSPITAL OUTPT CLINIC VISIT: HCPCS | Performed by: FAMILY MEDICINE

## 2023-01-25 PROCEDURE — G9899 SCRN MAM PERF RSLTS DOC: HCPCS | Performed by: FAMILY MEDICINE

## 2023-01-25 PROCEDURE — G8536 NO DOC ELDER MAL SCRN: HCPCS | Performed by: FAMILY MEDICINE

## 2023-01-25 PROCEDURE — 1101F PT FALLS ASSESS-DOCD LE1/YR: CPT | Performed by: FAMILY MEDICINE

## 2023-01-25 PROCEDURE — 99213 OFFICE O/P EST LOW 20 MIN: CPT | Performed by: FAMILY MEDICINE

## 2023-01-25 PROCEDURE — G9717 DOC PT DX DEP/BP F/U NT REQ: HCPCS | Performed by: FAMILY MEDICINE

## 2023-01-25 PROCEDURE — 3017F COLORECTAL CA SCREEN DOC REV: CPT | Performed by: FAMILY MEDICINE

## 2023-01-25 RX ORDER — BENZONATATE 200 MG/1
200 CAPSULE ORAL
Qty: 15 CAPSULE | Refills: 0 | Status: SHIPPED | OUTPATIENT
Start: 2023-01-25 | End: 2023-02-01

## 2023-01-25 RX ORDER — NIRMATRELVIR AND RITONAVIR 150-100 MG
KIT ORAL
Qty: 1 BOX | Refills: 0 | Status: SHIPPED | OUTPATIENT
Start: 2023-01-25

## 2023-01-25 RX ORDER — ALBUTEROL SULFATE 90 UG/1
2 AEROSOL, METERED RESPIRATORY (INHALATION)
Qty: 1 EACH | Refills: 0 | Status: SHIPPED | OUTPATIENT
Start: 2023-01-25

## 2023-01-25 NOTE — PROGRESS NOTES
Handy Phiilp  81 y.o. female  1950  [facility] [id number]  [id number]     [address] [facility]       Encounter Date: 1/25/2023           Established Patient Visit Note: Carlos Moralez MD    Reason for Appointment:  Chief Complaint   Patient presents with    Positive For Covid-19         History of Present Illness:  History provided by patient    Handy Philip is a 81 y.o. female who presents today for:     COVID  Duration: she tested positive yesterday. She reports that she was feeling poorly Monday night. She reports that she was vomiting yesterday, she is not today. She reports having cough productive of yellow phlegm. She also endorses having runny nose. She reports having muscle aches. AS: she reports having some wheezing and a little shortness of breath going up/down stairs. She reports having 3 COVID vaccine with last vaccine on 9/24/21. She reports that her granddaughter had COVID, and she got it from her. She reports having COVID 1 year ago. Review of Systems  See HPI       Allergies: Patient has no known allergies. Medications:     Current Outpatient Medications:     albuterol (PROVENTIL HFA, VENTOLIN HFA, PROAIR HFA) 90 mcg/actuation inhaler, Take 2 Puffs by inhalation every six (6) hours as needed for Wheezing., Disp: 1 Each, Rfl: 0    nirmatrelvir-ritonavir (Paxlovid, EUA,) 150-100 mg, Renal Dose: take as written on package. , Disp: 1 Box, Rfl: 0    benzonatate (TESSALON) 200 mg capsule, Take 1 Capsule by mouth three (3) times daily as needed for Cough for up to 7 days. , Disp: 15 Capsule, Rfl: 0    meloxicam (MOBIC) 15 mg tablet, TAKE 1 TABLET BY MOUTH EVERY DAY, Disp: 90 Tablet, Rfl: 0    buPROPion XL (Wellbutrin XL) 300 mg XL tablet, Take 300 mg by mouth daily. , Disp: , Rfl:     atorvastatin (LIPITOR) 20 mg tablet, TAKE 1 TABLET BY MOUTH EVERY DAY, Disp: 90 Tablet, Rfl: 0    magnesium 250 mg tab, Take  by mouth., Disp: , Rfl: calcium-cholecalciferol, d3, (CALCIUM 600 + D) 600-125 mg-unit tab, Take  by mouth., Disp: , Rfl:     multivitamin (ONE A DAY) tablet, Take 1 Tablet by mouth daily. , Disp: , Rfl:     omega 3-dha-epa-fish oil (Fish OiL) 100-160-1,000 mg cap, Take  by mouth., Disp: , Rfl:     estradioL (ESTRACE) 0.01 % (0.1 mg/gram) vaginal cream, INSERT 1 GRAM VAGINALLY TWICE WEEKLY, Disp: , Rfl:     escitalopram oxalate (LEXAPRO) 20 mg tablet, Take 1 Tab by mouth daily. Indications: MAJOR DEPRESSIVE DISORDER (Patient taking differently: Take 10 mg by mouth daily. Indications: major depressive disorder), Disp: 30 Tab, Rfl: 0    busPIRone (BUSPAR) 10 mg tablet, Take 1 Tab by mouth two (2) times a day. Indications: GENERALIZED ANXIETY DISORDER, Disp: 60 Tab, Rfl: 1    FEXOFENADINE/PSEUDOEPHEDRINE (ALLEGRA-D 12 HOUR PO), Take 1 Tab by mouth daily as needed (Allergies). Indications: SEASONAL ALLERGIES, Disp: , Rfl:     History  Patient Care Team:  Yashira Friend MD as PCP - Jackson Lott MD as PCP - [facility]    Past Medical History: she has a past medical history of Anxiety, AR (allergic rhinitis), Depression, Lyme disease (09/2020), and Spondylosis. Past Surgical History: she has a past surgical history that includes hx hysterectomy. Family Medical History: family history includes Cancer (age of onset: 80) in her mother; Heart Disease (age of onset: 80) in her father; Lung Disease in her father; Other (age of onset: 40) in her brother. Social History: she reports that she quit smoking about 19 years ago. Her smoking use included cigarettes. She has never used smokeless tobacco. She reports current alcohol use of about 0.8 standard drinks per week. She reports that she does not use drugs. Objective:     Physical Exam    Constitutional:       General: Not in acute distress. Appearance: Normal appearance. Not ill-appearing,  Not toxic-appearing. HENT:      Head: Normocephalic.       Right Ear: External ear normal.      Left Ear: External ear normal.      Nose: Nose normal.   Eyes:      General: No scleral icterus. Right eye: No discharge. Left eye: No discharge. Extraocular Movements: Extraocular movements intact. Conjunctiva/sclera: Conjunctivae normal.   Neck:      Musculoskeletal: Normal range of motion. Pulmonary:      Effort: Pulmonary effort is normal. No respiratory distress. Neurological:      Mental Status: Mental status is at baseline. Psychiatric:         Mood and Affect: Mood normal.         Behavior: Behavior normal.         Thought Content: Thought content normal.         Judgment: Judgment normal.       Assessment & Plan:      ICD-10-CM ICD-9-CM    1. COVID-19  U07.1 079.89 nirmatrelvir-ritonavir (Paxlovid, EUA,) 150-100 mg      benzonatate (TESSALON) 200 mg capsule      2. Upper respiratory tract infection, unspecified type  J06.9 465.9 albuterol (PROVENTIL HFA, VENTOLIN HFA, PROAIR HFA) 90 mcg/actuation inhaler      3. Seasonal affective disorder ([facility]  F33.8 296.99           COVID 19: Acute, uncontrolled. Treat with Paxlovid. Advised to hold Atorvastatin while taking Paxlovid. Continue symptomatic treatment. Discussed criteria for release from quarantine. Discussed red flag symptoms and reasons to call or go to ED  CKD: Last GFR was 43 on 4/12/22. Will provide renal dosing for Paxlovid. Advised to avoid and minimize usage of NSAIDs. I was in the office while conducting this encounter. Consent:  She and/or her healthcare decision maker is aware that this patient-initiated Telehealth encounter is a billable service, with coverage as determined by her insurance carrier. She is aware that she may receive a bill and has provided verbal consent to proceed: Yes    This virtual visit was conducted via [address]  Pursuant to the emergency declaration under the [address], [address]5 waiver authority and the Coronavirus Preparedness and Response Supplemental Appropriations Act, this Virtual  Visit was conducted to reduce the patient's risk of exposure to COVID-19 and provide continuity of care for an established patient. Services were provided through a video synchronous discussion virtually to substitute for in-person clinic visit. Due to this being a TeleHealth evaluation, many elements of the physical examination are unable to be assessed. Total Time: minutes: 21-30 minutes. I have discussed the diagnosis with the patient and the intended plan as seen in the above orders. The patient has received an after-visit summary along with patient information handout. I have discussed medication side effects and warnings with the patient as well. Disposition  Follow-up and Dispositions    Return if symptoms worsen or fail to improve.            Yasmeen Allen MD

## 2023-01-26 ENCOUNTER — TELEPHONE (OUTPATIENT)
Dept: FAMILY MEDICINE CLINIC | Age: 73
End: 2023-01-26

## 2023-01-26 NOTE — TELEPHONE ENCOUNTER
----- Message from Adalgisa Thompson sent at 1/25/2023  4:59 PM EST -----  Subject: Message to Provider    QUESTIONS  Information for Provider? pt has been online holding for an hour and a   half waiting for her vv and we have tried contacting the office 3x and   haven't been able to reach anyone. She does not want to continue to hold   but she isn't feeling well and needs to speak with her doctor. Please call   her back as soon as possible.   ---------------------------------------------------------------------------  --------------  Linda PIKE  [phone]; OK to leave message on voicemail  ---------------------------------------------------------------------------  --------------  SCRIPT ANSWERS  Relationship to Patient?  Self

## 2023-01-28 RX ORDER — ATORVASTATIN CALCIUM 20 MG/1
TABLET, FILM COATED ORAL
Qty: 90 TABLET | Refills: 0 | Status: SHIPPED | OUTPATIENT
Start: 2023-01-28

## 2023-01-28 RX ORDER — MELOXICAM 15 MG/1
TABLET ORAL
Qty: 90 TABLET | Refills: 0 | Status: SHIPPED | OUTPATIENT
Start: 2023-01-28

## 2023-03-01 ENCOUNTER — OFFICE VISIT (OUTPATIENT)
Dept: FAMILY MEDICINE CLINIC | Age: 73
End: 2023-03-01
Payer: MEDICARE

## 2023-03-01 ENCOUNTER — APPOINTMENT (OUTPATIENT)
Dept: FAMILY MEDICINE CLINIC | Age: 73
End: 2023-03-01
Payer: MEDICARE

## 2023-03-01 ENCOUNTER — NURSE TRIAGE (OUTPATIENT)
Dept: OTHER | Facility: CLINIC | Age: 73
End: 2023-03-01

## 2023-03-01 VITALS
BODY MASS INDEX: 34.56 KG/M2 | OXYGEN SATURATION: 97 % | DIASTOLIC BLOOD PRESSURE: 62 MMHG | HEIGHT: 62 IN | SYSTOLIC BLOOD PRESSURE: 102 MMHG | HEART RATE: 77 BPM | WEIGHT: 187.8 LBS | RESPIRATION RATE: 18 BRPM | TEMPERATURE: 97 F

## 2023-03-01 DIAGNOSIS — J06.9 VIRAL URI WITH COUGH: Primary | ICD-10-CM

## 2023-03-01 DIAGNOSIS — N28.9 ABNORMAL KIDNEY FUNCTION: ICD-10-CM

## 2023-03-01 DIAGNOSIS — R06.2 WHEEZING: ICD-10-CM

## 2023-03-01 DIAGNOSIS — R73.09 ELEVATED GLUCOSE: ICD-10-CM

## 2023-03-01 DIAGNOSIS — J30.89 NON-SEASONAL ALLERGIC RHINITIS, UNSPECIFIED TRIGGER: ICD-10-CM

## 2023-03-01 DIAGNOSIS — E78.2 MIXED HYPERLIPIDEMIA: ICD-10-CM

## 2023-03-01 LAB
QUICKVUE INFLUENZA TEST: NEGATIVE
VALID INTERNAL CONTROL?: YES

## 2023-03-01 PROCEDURE — G0463 HOSPITAL OUTPT CLINIC VISIT: HCPCS | Performed by: STUDENT IN AN ORGANIZED HEALTH CARE EDUCATION/TRAINING PROGRAM

## 2023-03-01 PROCEDURE — 87804 INFLUENZA ASSAY W/OPTIC: CPT | Performed by: STUDENT IN AN ORGANIZED HEALTH CARE EDUCATION/TRAINING PROGRAM

## 2023-03-01 RX ORDER — FLUTICASONE PROPIONATE 50 MCG
2 SPRAY, SUSPENSION (ML) NASAL DAILY
Qty: 1 EACH | Refills: 0 | Status: SHIPPED | OUTPATIENT
Start: 2023-03-01

## 2023-03-01 RX ORDER — AZITHROMYCIN 250 MG/1
TABLET, FILM COATED ORAL
Qty: 6 TABLET | Refills: 0 | Status: SHIPPED | OUTPATIENT
Start: 2023-03-01 | End: 2023-03-06

## 2023-03-01 RX ORDER — BENZONATATE 100 MG/1
100 CAPSULE ORAL
Qty: 30 CAPSULE | Refills: 0 | Status: SHIPPED | OUTPATIENT
Start: 2023-03-01 | End: 2023-03-11

## 2023-03-01 NOTE — PROGRESS NOTES
Ilya Liang  81 y.o. female  1950  [facility] [id number]  [id number]     [facility] PRACT[facility]       Chief Complaint: coughing  Source: self    Subjective  Ilya Liang is an 81 y.o. female with AR, severe obesity who presents for coughing. She was positive for COVID ~1 month ago treated on paxlovid. Symptoms from covid resolved but started with recurrent coughing over the last last and feels like it is getting worse. Cough is not productive, no fevers, no chills. No one at home with similar symptoms. Was out [address] and returned and things were blooming and was up in the attic. Takes allegra D year round but wasn't able to get allergra with D so pharmacist told her to get allegra with sudafed which she has been taking for a few days. She does have pressure in her sinuses but sinus drainage is clear. Allergies - reviewed:   No Known Allergies      Medications - reviewed:   Current Outpatient Medications   Medication Sig    atorvastatin (LIPITOR) 20 mg tablet TAKE 1 TABLET BY MOUTH EVERY DAY    meloxicam (MOBIC) 15 mg tablet TAKE 1 TABLET BY MOUTH EVERY DAY    albuterol (PROVENTIL HFA, VENTOLIN HFA, PROAIR HFA) 90 mcg/actuation inhaler Take 2 Puffs by inhalation every six (6) hours as needed for Wheezing. buPROPion XL (Wellbutrin XL) 300 mg XL tablet Take 300 mg by mouth daily. magnesium 250 mg tab Take  by mouth.    calcium-cholecalciferol, d3, (CALCIUM 600 + D) 600-125 mg-unit tab Take  by mouth.    multivitamin (ONE A DAY) tablet Take 1 Tablet by mouth daily. omega 3-dha-epa-fish oil (Fish OiL) 100-160-1,000 mg cap Take  by mouth.    estradioL (ESTRACE) 0.01 % (0.1 mg/gram) vaginal cream INSERT 1 GRAM VAGINALLY TWICE WEEKLY    escitalopram oxalate (LEXAPRO) 20 mg tablet Take 1 Tab by mouth daily. Indications: MAJOR DEPRESSIVE DISORDER (Patient taking differently: Take 10 mg by mouth daily.  Indications: major depressive disorder)    busPIRone (BUSPAR) 10 mg tablet Take 1 Tab by mouth two (2) times a day. Indications: GENERALIZED ANXIETY DISORDER    FEXOFENADINE/PSEUDOEPHEDRINE (ALLEGRA-D 12 HOUR PO) Take 1 Tab by mouth daily as needed (Allergies). Indications: SEASONAL ALLERGIES     No current facility-administered medications for this visit. Past Medical History - reviewed:  Past Medical History:   Diagnosis Date    Anxiety     AR (allergic rhinitis)     Depression     Lyme disease 2020    treated with doxy    Spondylosis          Past Surgical History - reviewed:   Past Surgical History:   Procedure Laterality Date    HX HYSTERECTOMY           Social History - reviewed:  Social History     Socioeconomic History    Marital status:      Spouse name: Not on file    Number of children: Not on file    Years of education: Not on file    Highest education level: Not on file   Occupational History    Not on file   Tobacco Use    Smoking status: Former     Years: 30.00     Types: Cigarettes     Quit date: 2003     Years since quittin.4     Passive exposure: Never    Smokeless tobacco: Never   Vaping Use    Vaping Use: Never used   Substance and Sexual Activity    Alcohol use: Yes     Alcohol/week: 0.8 standard drinks     Types: 1 Glasses of wine per week     Comment: ocassional    Drug use: No    Sexual activity: Yes     Partners: Male   Other Topics Concern    Not on file   Social History Narrative    63year old   female voluntarily admitted after declaring intent to commit suicide by injecting an insulin overdose. Pt's trigger is the anniversary of her son's suicide in senior living 3 years ago. Pt. Had son arrested for stealing her jewelry to sell for drug money. 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 She sees DR Durga Alberto once every three months for psychiatric med mnagement. She has been resistant to any medication changes proposed. She has been on Well butrin 150 mg po tid for \"years\".   Pt. Sees a therapist in [place] \"PRN\". She does not participate in therapy, grif groups, or perez groups on any regular basis. Social Determinants of Health     Financial Resource Strain: Unknown    Difficulty of Paying Living Expenses: Patient refused   Food Insecurity: Unknown    Worried About Running Out of Food in the Last Year: Patient refused    [address] in the Last Year: Patient refused   Transportation Needs: Not on file   Physical Activity: Not on file   Stress: Not on file   Social Connections: Not on file   Intimate Partner Violence: Not on file   Housing Stability: Not on file         Family History - reviewed:  Family History   Problem Relation Age of Onset    Cancer Mother 80        lyphoma    Lung Disease Father     Heart Disease Father 80        a fib    Other Brother 40        Amlydosis         Immunizations - reviewed:   Immunization History   Administered Date(s) Administered     Influenza, Zuleika Mancilla (age 72 y+), High Dose 09/02/2020    COVID-19, PFIZER PURPLE top, DILUTE for use, (age 15 y+), IM, 30mcg/0.3mL 02/06/2021, 03/04/2021, 09/24/2021    Influenza High Dose Vaccine PF 09/05/2020    Influenza Vaccine 10/08/2014, 10/19/2016, 10/16/2017, 10/06/2018    Influenza Vaccine (Tri) Adjuvanted (>65 Yrs FLUAD TRI 29201) 09/04/2019    Influenza, FLUAD, (age 72 y+), Adjuvanted 10/12/2021    Pneumococcal Conjugate (PCV-13) 09/21/2018    Pneumococcal Polysaccharide (PPSV-23) 03/04/2020    Tdap 03/25/2015    Zoster Recombinant 05/28/2020       Review of Systems   Constitutional:  Positive for malaise/fatigue. Negative for chills and fever. HENT:  Positive for congestion. Negative for ear pain, sinus pain and sore throat. Eyes:  Positive for double vision. Negative for blurred vision and redness. Respiratory:  Positive for cough. Negative for shortness of breath and wheezing. Cardiovascular:  Negative for chest pain and palpitations. Musculoskeletal:  Negative for myalgias. Skin:  Negative for itching and rash. Neurological:  Negative for dizziness and headaches. Physical Exam  Visit Vitals  /62 (BP 1 Location: Left upper arm, BP Patient Position: Sitting, BP Cuff Size: Large adult)   Pulse 77   Temp 97 °F (36.1 °C) (Temporal)   Resp 18   Ht 5' 2\" (1.575 m)   Wt 187 lb 12.8 oz (85.2 kg)   SpO2 97%   BMI 34.35 kg/m²       Physical Exam  Vitals and nursing note reviewed. Constitutional:       General: She is not in acute distress. Appearance: Normal appearance. She is not ill-appearing, toxic-appearing or diaphoretic. HENT:      Head: Normocephalic and atraumatic. Salivary Glands: Right salivary gland is not diffusely enlarged or tender. Left salivary gland is not diffusely enlarged or tender. Right Ear: Tympanic membrane, ear canal and external ear normal. Tympanic membrane is not injected, scarred, perforated, erythematous or retracted. Left Ear: Tympanic membrane, ear canal and external ear normal. Tympanic membrane is not injected, scarred, perforated, erythematous or retracted. Ears:      Comments: Clear bilateral middle ear effusions      Nose: Mucosal edema and rhinorrhea present. Mouth/Throat:      Lips: Pink. No lesions. Mouth: Mucous membranes are moist. No oral lesions. Palate: No mass. Pharynx: Oropharynx is clear. Tonsils: No tonsillar exudate. Cardiovascular:      Rate and Rhythm: Normal rate and regular rhythm. Pulses: Normal pulses. Heart sounds: Normal heart sounds. No murmur heard. No friction rub. No gallop. Pulmonary:      Effort: Pulmonary effort is normal. No respiratory distress. Breath sounds: Normal breath sounds. No stridor. No wheezing, rhonchi or rales. Neurological:      Mental Status: She is alert.      Labs -personally reviewed and interpreted   Recent Results (from the past 12 hour(s))   AMB POC RAPID INFLUENZA TEST    Collection Time: 03/01/23  9:51 AM   Result Value Ref Range    VALID INTERNAL CONTROL POC Yes     QuickVue Influenza test Negative Negative         Assessment/Plan    ICD-10-CM ICD-9-CM    1. Viral URI with cough  J06.9 465.9 fluticasone propionate (FLONASE) 50 mcg/actuation nasal spray      azithromycin (ZITHROMAX) 250 mg tablet      benzonatate (TESSALON) 100 mg capsule      2. Non-seasonal allergic rhinitis, unspecified trigger  J30.89 477.8 fluticasone propionate (FLONASE) 50 mcg/actuation nasal spray      REFERRAL TO ALLERGY      benzonatate (TESSALON) 100 mg capsule      3. Wheezing  R06.2 786.07 AMB POC RAPID INFLUENZA TEST          Iqra Sweeney is an 81 y.o. female with hx of AR and obesity here today for a week of cough with nasal drainage. Likely viral vs allergic symptoms spurring respiratory issue - no cute changes on lung exam and certainly no wheezes noted. Would benefit from eval by allergy for discussion of allergy shots given has significant and persistent allergies throughout the year. Suspect recent covid infection also contributing to current symptoms. 1. Viral URI with cough  - fluticasone propionate (FLONASE) 50 mcg/actuation nasal spray; 2 Sprays by Both Nostrils route daily. Dispense: 1 Each; Refill: 0  - azithromycin (ZITHROMAX) 250 mg tablet; Take 2 tablets today, then take 1 tablet daily  Dispense: 6 Tablet; Refill: 0  - benzonatate (TESSALON) 100 mg capsule; Take 1 Capsule by mouth three (3) times daily as needed for Cough for up to 10 days. Dispense: 30 Capsule; Refill: 0    2. Non-seasonal allergic rhinitis, unspecified trigger  - fluticasone propionate (FLONASE) 50 mcg/actuation nasal spray; 2 Sprays by Both Nostrils route daily. Dispense: 1 Each; Refill: 0  - allegra vs zyrtec daily for antihistamine  - REFERRAL TO ALLERGY  - benzonatate (TESSALON) 100 mg capsule; Take 1 Capsule by mouth three (3) times daily as needed for Cough for up to 10 days. Dispense: 30 Capsule; Refill: 0    3.  Wheezing  - AMB POC RAPID INFLUENZA TEST    Patient informed to follow up: Follow-up and Dispositions    Return for routine care with dr naqvi . I have discussed the diagnosis with the patient and the intended plan as seen in the above orders. Patient verbalized understanding of the plan and agrees with the plan. The patient has received an after-visit summary and questions were answered concerning future plans. I have discussed medication side effects and warnings with the patient as well. Informed patient to return to the office if new symptoms arise.       Анна Ospina MD  [facility]

## 2023-03-01 NOTE — TELEPHONE ENCOUNTER
Location of patient: [address] [place] from [place] [facility] with [employer]. Subjective: Caller states \"lingering cough, sob\"     Current Symptoms: dry cough at times, other times can be productive, last night didn't sleep well, had to prop up,wheezing. Did have Covid a month ago. SOB/wheezing with exertion. Tightness all the time. Onset: 1 week ago; worsening    Associated Symptoms: NA    Pain Severity: 0/10; just chest tightness    Temperature: denies fever     What has been tried: albuterol, allegra, sudafed    LMP: NA Pregnant: NA    Recommended disposition: Go to Office Now    Care advice provided, patient verbalizes understanding; denies any other questions or concerns; instructed to call back for any new or worsening symptoms. Patient/Caller agrees with recommended disposition; writer provided warm transfer to [facility] for appointment scheduling    Attention Provider: Thank you for allowing me to participate in the care of your patient. The patient was connected to triage in response to information provided to the [facility]. Please do not respond through this encounter as the response is not directed to a shared pool.     Reason for Disposition   MILD difficulty breathing (e.g., minimal/no SOB at rest, SOB with walking, pulse < 100) of new-onset or worse than normal    Protocols used: Breathing Difficulty-ADULT-[place]

## 2023-03-01 NOTE — PROGRESS NOTES
Chief Complaint   Patient presents with    Cough     Chest Tightness         1. \"Have you been to the ER, urgent care clinic since your last visit? Hospitalized since your last visit? \" No    2. \"Have you seen or consulted any other health care providers outside of the [address] [place] [address] since your last visit? \" No     3. For patients aged 39-70: Has the patient had a colonoscopy / FIT/ Cologuard? Yes - no Care Gap present      If the patient is female:    4. For patients aged 41-77: Has the patient had a mammogram within the past 2 years? Yes - no Care Gap present      5. For patients aged 21-65: Has the patient had a pap smear?  NA - based on age or sex         3 most recent PHQ Screens 3/1/2023   PHQ Not Done -   Little interest or pleasure in doing things Not at all   Feeling down, depressed, irritable, or hopeless Not at all   Total Score PHQ 2 0   Trouble falling or staying asleep, or sleeping too much Not at all   Feeling tired or having little energy Not at all   Poor appetite, weight loss, or overeating Not at all   Feeling bad about yourself - or that you are a failure or have let yourself or your family down Not at all   Trouble concentrating on things such as school, work, reading, or watching TV Not at all   Moving or speaking so slowly that other people could have noticed; or the opposite being so fidgety that others notice Not at all   Thoughts of being better off dead, or hurting yourself in some way Not at all   PHQ 9 Score 0   How difficult have these problems made it for you to do your work, take care of your home and get along with others Not difficult at all       Health Maintenance Due   Topic Date Due    Shingles Vaccine (2 of 2) 07/23/2020    COVID-19 Vaccine (4 - Booster for Yeung Peter series) 11/19/2021    Flu Vaccine (1) 08/01/2022

## 2023-03-02 LAB
ALBUMIN SERPL-MCNC: 3.7 G/DL (ref 3.5–5)
ALBUMIN/GLOB SERPL: 1.1 (ref 1.1–2.2)
ALP SERPL-CCNC: 84 U/L (ref 45–117)
ALT SERPL-CCNC: 23 U/L (ref 12–78)
ANION GAP SERPL CALC-SCNC: 1 MMOL/L (ref 5–15)
AST SERPL-CCNC: 13 U/L (ref 15–37)
BILIRUB SERPL-MCNC: 0.3 MG/DL (ref 0.2–1)
BUN SERPL-MCNC: 38 MG/DL (ref 6–20)
BUN/CREAT SERPL: 29 (ref 12–20)
CALCIUM SERPL-MCNC: 9.7 MG/DL (ref 8.5–10.1)
CHLORIDE SERPL-SCNC: 108 MMOL/L (ref 97–108)
CHOLEST SERPL-MCNC: 182 MG/DL
CO2 SERPL-SCNC: 31 MMOL/L (ref 21–32)
CREAT SERPL-MCNC: 1.29 MG/DL (ref 0.55–1.02)
EST. AVERAGE GLUCOSE BLD GHB EST-MCNC: 114 MG/DL
GLOBULIN SER CALC-MCNC: 3.4 G/DL (ref 2–4)
GLUCOSE SERPL-MCNC: 89 MG/DL (ref 65–100)
HBA1C MFR BLD: 5.6 % (ref 4–5.6)
HDLC SERPL-MCNC: 72 MG/DL
HDLC SERPL: 2.5 (ref 0–5)
LDLC SERPL CALC-MCNC: 84.4 MG/DL (ref 0–100)
POTASSIUM SERPL-SCNC: 5.1 MMOL/L (ref 3.5–5.1)
PROT SERPL-MCNC: 7.1 G/DL (ref 6.4–8.2)
SODIUM SERPL-SCNC: 140 MMOL/L (ref 136–145)
TRIGL SERPL-MCNC: 128 MG/DL (ref ?–150)
VLDLC SERPL CALC-MCNC: 25.6 MG/DL

## 2023-03-23 DIAGNOSIS — J30.89 NON-SEASONAL ALLERGIC RHINITIS, UNSPECIFIED TRIGGER: ICD-10-CM

## 2023-03-23 DIAGNOSIS — J06.9 VIRAL URI WITH COUGH: ICD-10-CM

## 2023-03-23 RX ORDER — FLUTICASONE PROPIONATE 50 MCG
SPRAY, SUSPENSION (ML) NASAL
Qty: 48 G | Refills: 0 | Status: SHIPPED | OUTPATIENT
Start: 2023-03-23

## 2023-04-10 PROBLEM — N18.30 CHRONIC RENAL DISEASE, STAGE III (HCC): Status: RESOLVED | Noted: 2023-04-10 | Resolved: 2023-04-10

## 2023-04-10 PROBLEM — M17.12 PRIMARY OSTEOARTHRITIS OF LEFT KNEE: Status: ACTIVE | Noted: 2023-04-10

## 2023-04-10 PROBLEM — N18.30 CHRONIC RENAL DISEASE, STAGE III (HCC): Status: ACTIVE | Noted: 2023-04-10

## 2023-04-21 ENCOUNTER — LAB ONLY (OUTPATIENT)
Dept: FAMILY MEDICINE CLINIC | Age: 73
End: 2023-04-21

## 2023-04-21 DIAGNOSIS — N28.9 ABNORMAL KIDNEY FUNCTION: ICD-10-CM

## 2023-04-22 LAB
ANION GAP SERPL CALC-SCNC: 5 MMOL/L (ref 5–15)
BUN SERPL-MCNC: 21 MG/DL (ref 6–20)
BUN/CREAT SERPL: 21 (ref 12–20)
CALCIUM SERPL-MCNC: 9.3 MG/DL (ref 8.5–10.1)
CHLORIDE SERPL-SCNC: 109 MMOL/L (ref 97–108)
CO2 SERPL-SCNC: 27 MMOL/L (ref 21–32)
CREAT SERPL-MCNC: 1.02 MG/DL (ref 0.55–1.02)
GLUCOSE SERPL-MCNC: 99 MG/DL (ref 65–100)
POTASSIUM SERPL-SCNC: 4.3 MMOL/L (ref 3.5–5.1)
SODIUM SERPL-SCNC: 141 MMOL/L (ref 136–145)

## 2023-05-09 ENCOUNTER — PATIENT MESSAGE (OUTPATIENT)
Age: 73
End: 2023-05-09

## 2023-05-22 RX ORDER — DENOSUMAB 60 MG/ML
60 INJECTION SUBCUTANEOUS ONCE
Qty: 180 ML | Refills: 1
Start: 2023-05-22 | End: 2023-05-22

## 2023-06-11 DIAGNOSIS — M17.11 UNILATERAL PRIMARY OSTEOARTHRITIS, RIGHT KNEE: ICD-10-CM

## 2023-06-11 PROBLEM — M81.0 OSTEOPOROSIS: Status: ACTIVE | Noted: 2023-06-11

## 2023-06-11 RX ORDER — MELOXICAM 15 MG/1
TABLET ORAL
Qty: 90 TABLET | Refills: 1 | Status: SHIPPED | OUTPATIENT
Start: 2023-06-11

## 2023-06-18 DIAGNOSIS — J06.9 ACUTE UPPER RESPIRATORY INFECTION, UNSPECIFIED: ICD-10-CM

## 2023-06-18 DIAGNOSIS — J30.89 OTHER ALLERGIC RHINITIS: ICD-10-CM

## 2023-06-19 ENCOUNTER — HOSPITAL ENCOUNTER (OUTPATIENT)
Facility: HOSPITAL | Age: 73
Setting detail: INFUSION SERIES
End: 2023-06-19
Payer: MEDICARE

## 2023-06-19 VITALS
SYSTOLIC BLOOD PRESSURE: 124 MMHG | HEART RATE: 82 BPM | DIASTOLIC BLOOD PRESSURE: 75 MMHG | RESPIRATION RATE: 18 BRPM | TEMPERATURE: 97.6 F

## 2023-06-19 DIAGNOSIS — M81.0 OSTEOPOROSIS, UNSPECIFIED OSTEOPOROSIS TYPE, UNSPECIFIED PATHOLOGICAL FRACTURE PRESENCE: Primary | ICD-10-CM

## 2023-06-19 LAB
ANION GAP BLD CALC-SCNC: 12 MMOL/L (ref 10–20)
CA-I BLD-MCNC: 1.17 MMOL/L (ref 1.12–1.32)
CHLORIDE BLD-SCNC: 110 MMOL/L (ref 98–107)
CO2 BLD-SCNC: 22.2 MMOL/L (ref 21–32)
CREAT BLD-MCNC: 1.1 MG/DL (ref 0.6–1.3)
GLUCOSE BLD-MCNC: 79 MG/DL (ref 65–100)
PHOSPHATE SERPL-MCNC: 3.4 MG/DL (ref 2.6–4.7)
POTASSIUM BLD-SCNC: 4.3 MMOL/L (ref 3.5–5.1)
SERVICE CMNT-IMP: ABNORMAL
SODIUM BLD-SCNC: 143 MMOL/L (ref 136–145)

## 2023-06-19 PROCEDURE — 84100 ASSAY OF PHOSPHORUS: CPT

## 2023-06-19 PROCEDURE — 36415 COLL VENOUS BLD VENIPUNCTURE: CPT

## 2023-06-19 PROCEDURE — 96372 THER/PROPH/DIAG INJ SC/IM: CPT

## 2023-06-19 PROCEDURE — 80047 BASIC METABLC PNL IONIZED CA: CPT

## 2023-06-19 PROCEDURE — 6360000002 HC RX W HCPCS: Performed by: FAMILY MEDICINE

## 2023-06-19 RX ORDER — FLUTICASONE PROPIONATE 50 MCG
SPRAY, SUSPENSION (ML) NASAL
Qty: 1 EACH | Refills: 5 | Status: SHIPPED | OUTPATIENT
Start: 2023-06-19

## 2023-06-19 RX ADMIN — DENOSUMAB 60 MG: 60 INJECTION SUBCUTANEOUS at 15:41

## 2023-06-19 NOTE — PROGRESS NOTES
OPIC Short Note                       Date: 2023    Name: Nas Apodaca    MRN: [id number]         : 1950      Pt admit to [facility] for [facility] ambulatory in stable condition. Assessment completed and documented in flowsheets. No acute concerns at this time. Pt denies recent and upcoming invasive dental procedures. Labs drawn with no difficulty from left hand. Please review pending lab results in [address] [facility]. Ms. Inez Licona vitals were reviewed:  Patient Vitals for the past 12 hrs:   Temp Pulse Resp BP   23 1445 97.6 °F (36.4 °C) 82 18 124/75         Lab results were obtained and reviewed. Labs within parameter for treatment. Recent Results (from the past 12 hour(s))   POC CHEM 8    Collection Time: 23  3:15 PM   Result Value Ref Range    POC Ionized Calcium 1.17 1.12 - 1.32 mmol/L    POC Sodium 143 136 - 145 mmol/L    POC Potassium 4.3 3.5 - 5.1 mmol/L    POC Chloride 110 (H) 98 - 107 mmol/L    POC TCO2 22.2 21 - 32 mmol/L    Anion Gap, POC 12 10 - 20 mmol/L    POC Glucose 79 65 - 100 mg/dL    POC Creatinine 1.10 0.6 - 1.3 mg/dL    eGFR, POC 54 (L) >60 ml/min/1.73m2    UA Comment Comment Not Indicated. Medications given: given in left arm  Medications Administered         denosumab (PROLIA) SC injection 60 mg Admin Date  2023 Action  Given Dose  60 mg Route  SubCUTAneous Administered By  Fermín Cortes RN            Medication education reinforced with patient and they verbalize understanding. Ms. Kristen Stevens tolerated the injection and was discharged from [place] [address] in stable condition. Patient is aware if future appointments.     Future Appointments   Date Time Provider Klever Kenney   10/16/2023  1:30 PM Rae Amaya MD [facility] BS AMB   2023  3:00 PM ZOYA READ [facility]       Nicki Cruz RN  2023  4:44 PM

## 2023-10-16 ENCOUNTER — OFFICE VISIT (OUTPATIENT)
Age: 73
End: 2023-10-16
Payer: MEDICARE

## 2023-10-16 VITALS
BODY MASS INDEX: 32.94 KG/M2 | DIASTOLIC BLOOD PRESSURE: 60 MMHG | SYSTOLIC BLOOD PRESSURE: 110 MMHG | RESPIRATION RATE: 16 BRPM | HEART RATE: 75 BPM | OXYGEN SATURATION: 98 % | WEIGHT: 179 LBS | HEIGHT: 62 IN | TEMPERATURE: 97.7 F

## 2023-10-16 DIAGNOSIS — Z12.11 COLON CANCER SCREENING: ICD-10-CM

## 2023-10-16 DIAGNOSIS — R73.09 OTHER ABNORMAL GLUCOSE: ICD-10-CM

## 2023-10-16 DIAGNOSIS — E78.2 MIXED HYPERLIPIDEMIA: Primary | ICD-10-CM

## 2023-10-16 DIAGNOSIS — E66.01 MORBID (SEVERE) OBESITY DUE TO EXCESS CALORIES (HCC): ICD-10-CM

## 2023-10-16 DIAGNOSIS — Z11.59 NEED FOR HEPATITIS C SCREENING TEST: ICD-10-CM

## 2023-10-16 PROCEDURE — 1123F ACP DISCUSS/DSCN MKR DOCD: CPT | Performed by: FAMILY MEDICINE

## 2023-10-16 PROCEDURE — 1036F TOBACCO NON-USER: CPT | Performed by: FAMILY MEDICINE

## 2023-10-16 PROCEDURE — G9899 SCRN MAM PERF RSLTS DOC: HCPCS | Performed by: FAMILY MEDICINE

## 2023-10-16 PROCEDURE — 1090F PRES/ABSN URINE INCON ASSESS: CPT | Performed by: FAMILY MEDICINE

## 2023-10-16 PROCEDURE — G8427 DOCREV CUR MEDS BY ELIG CLIN: HCPCS | Performed by: FAMILY MEDICINE

## 2023-10-16 PROCEDURE — G8417 CALC BMI ABV UP PARAM F/U: HCPCS | Performed by: FAMILY MEDICINE

## 2023-10-16 PROCEDURE — 99214 OFFICE O/P EST MOD 30 MIN: CPT | Performed by: FAMILY MEDICINE

## 2023-10-16 PROCEDURE — 3017F COLORECTAL CA SCREEN DOC REV: CPT | Performed by: FAMILY MEDICINE

## 2023-10-16 PROCEDURE — G8484 FLU IMMUNIZE NO ADMIN: HCPCS | Performed by: FAMILY MEDICINE

## 2023-10-16 PROCEDURE — G8400 PT W/DXA NO RESULTS DOC: HCPCS | Performed by: FAMILY MEDICINE

## 2023-10-16 SDOH — ECONOMIC STABILITY: FOOD INSECURITY: WITHIN THE PAST 12 MONTHS, YOU WORRIED THAT YOUR FOOD WOULD RUN OUT BEFORE YOU GOT MONEY TO BUY MORE.: NEVER TRUE

## 2023-10-16 SDOH — ECONOMIC STABILITY: HOUSING INSECURITY
IN THE LAST 12 MONTHS, WAS THERE A TIME WHEN YOU DID NOT HAVE A STEADY PLACE TO SLEEP OR SLEPT IN A SHELTER (INCLUDING NOW)?: NO

## 2023-10-16 SDOH — ECONOMIC STABILITY: INCOME INSECURITY: HOW HARD IS IT FOR YOU TO PAY FOR THE VERY BASICS LIKE FOOD, HOUSING, MEDICAL CARE, AND HEATING?: NOT HARD AT ALL

## 2023-10-16 SDOH — ECONOMIC STABILITY: FOOD INSECURITY: WITHIN THE PAST 12 MONTHS, THE FOOD YOU BOUGHT JUST DIDN'T LAST AND YOU DIDN'T HAVE MONEY TO GET MORE.: NEVER TRUE

## 2023-10-16 ASSESSMENT — PATIENT HEALTH QUESTIONNAIRE - PHQ9
SUM OF ALL RESPONSES TO PHQ9 QUESTIONS 1 & 2: 2
SUM OF ALL RESPONSES TO PHQ QUESTIONS 1-9: 2
SUM OF ALL RESPONSES TO PHQ QUESTIONS 1-9: 2
2. FEELING DOWN, DEPRESSED OR HOPELESS: 1
SUM OF ALL RESPONSES TO PHQ QUESTIONS 1-9: 2
1. LITTLE INTEREST OR PLEASURE IN DOING THINGS: 1
SUM OF ALL RESPONSES TO PHQ QUESTIONS 1-9: 2

## 2023-10-16 ASSESSMENT — ENCOUNTER SYMPTOMS
SHORTNESS OF BREATH: 0
COUGH: 0
BACK PAIN: 0
VOMITING: 0
NAUSEA: 0

## 2023-10-16 NOTE — PROGRESS NOTES
Chief Complaint   Patient presents with    Anxiety    Depression    Follow-up Chronic Condition     1. \"Have you been to the ER, urgent care clinic since your last visit? Hospitalized since your last visit? \" no    2. \"Have you seen or consulted any other health care providers outside of the [address] [place] [address] since your last visit? \"  Ortho for knee and back   Had injections in both feeling better     Colonoscopy no    Making cookies and steam burned her hand
risks/benefits/costs/interactions/alternatives discussed with patient. Advised patient to call back or return to office if symptoms worsen/change/persist.  If patient cannot reach us or should anything more severe/urgent arise he/she should proceed directly to the nearest emergency department. Discussed expected course/resolution/complications of diagnosis in detail with patient. Patient given a written after visit summary which includes diagnoses, current medications and vitals. Patient expressed understanding with the diagnosis and plan. Written by carlene Almaraz, as dictated by Ronen Ruiz M.D.    2:00 PM - 2:20 PM      Total time spent with the patient 20 minutes, greater than 50% of time spent counseling patient.

## 2023-10-17 LAB
ALBUMIN SERPL-MCNC: 4 G/DL (ref 3.5–5)
ALBUMIN/GLOB SERPL: 1.1 (ref 1.1–2.2)
ALP SERPL-CCNC: 73 U/L (ref 45–117)
ALT SERPL-CCNC: 28 U/L (ref 12–78)
ANION GAP SERPL CALC-SCNC: 4 MMOL/L (ref 5–15)
AST SERPL-CCNC: 20 U/L (ref 15–37)
BILIRUB SERPL-MCNC: 0.5 MG/DL (ref 0.2–1)
BUN SERPL-MCNC: 36 MG/DL (ref 6–20)
BUN/CREAT SERPL: 31 (ref 12–20)
CALCIUM SERPL-MCNC: 9.9 MG/DL (ref 8.5–10.1)
CHLORIDE SERPL-SCNC: 104 MMOL/L (ref 97–108)
CHOLEST SERPL-MCNC: 170 MG/DL
CO2 SERPL-SCNC: 28 MMOL/L (ref 21–32)
CREAT SERPL-MCNC: 1.17 MG/DL (ref 0.55–1.02)
EST. AVERAGE GLUCOSE BLD GHB EST-MCNC: 114 MG/DL
GLOBULIN SER CALC-MCNC: 3.6 G/DL (ref 2–4)
GLUCOSE SERPL-MCNC: 92 MG/DL (ref 65–100)
HBA1C MFR BLD: 5.6 % (ref 4–5.6)
HCV AB SER IA-ACNC: 0.02 INDEX
HCV AB SERPL QL IA: NONREACTIVE
HDLC SERPL-MCNC: 65 MG/DL
HDLC SERPL: 2.6 (ref 0–5)
LDLC SERPL CALC-MCNC: 67 MG/DL (ref 0–100)
POTASSIUM SERPL-SCNC: 4.9 MMOL/L (ref 3.5–5.1)
PROT SERPL-MCNC: 7.6 G/DL (ref 6.4–8.2)
SODIUM SERPL-SCNC: 136 MMOL/L (ref 136–145)
TRIGL SERPL-MCNC: 190 MG/DL
VLDLC SERPL CALC-MCNC: 38 MG/DL

## 2023-12-07 DIAGNOSIS — E78.2 MIXED HYPERLIPIDEMIA: ICD-10-CM

## 2023-12-08 NOTE — TELEPHONE ENCOUNTER
Last appointment: 10/16/23 MD Kelley, lipid 10/2023  Next appointment: 4/16/24 Warren  Previous refill encounter(s): 4/23/23 90 + 1    Requested Prescriptions     Pending Prescriptions Disp Refills    atorvastatin (LIPITOR) 20 MG tablet [Pharmacy Med Name: ATORVASTATIN 20 MG TABLET] 90 tablet 1     Sig: TAKE 1 TABLET BY MOUTH EVERY DAY     For Pharmacy Admin Tracking Only    Program: Medication Refill  CPA in place:    Recommendation Provided To:    Intervention Detail: New Rx: 1, reason: Patient Preference  Intervention Accepted By:   Jewel Higgins Closed?:    Time Spent (min): 5

## 2023-12-13 RX ORDER — ATORVASTATIN CALCIUM 20 MG/1
TABLET, FILM COATED ORAL
Qty: 90 TABLET | Refills: 1 | Status: SHIPPED | OUTPATIENT
Start: 2023-12-13

## 2023-12-22 ENCOUNTER — HOSPITAL ENCOUNTER (OUTPATIENT)
Facility: HOSPITAL | Age: 73
Setting detail: INFUSION SERIES
Discharge: HOME | End: 2023-12-22
Payer: MEDICARE

## 2023-12-22 VITALS
TEMPERATURE: 97.9 F | DIASTOLIC BLOOD PRESSURE: 81 MMHG | HEART RATE: 79 BPM | RESPIRATION RATE: 18 BRPM | SYSTOLIC BLOOD PRESSURE: 144 MMHG

## 2023-12-22 DIAGNOSIS — M81.0 OSTEOPOROSIS, UNSPECIFIED OSTEOPOROSIS TYPE, UNSPECIFIED PATHOLOGICAL FRACTURE PRESENCE: Primary | ICD-10-CM

## 2023-12-22 LAB
ANION GAP BLD CALC-SCNC: 9.2 MMOL/L (ref 10–20)
CA-I BLD-MCNC: 1.16 MMOL/L (ref 1.12–1.32)
CHLORIDE BLD-SCNC: 110 MMOL/L (ref 98–107)
CO2 BLD-SCNC: 21.8 MMOL/L (ref 21–32)
CREAT BLD-MCNC: 0.83 MG/DL (ref 0.6–1.3)
GLUCOSE BLD-MCNC: 79 MG/DL (ref 65–100)
POTASSIUM BLD-SCNC: 4.3 MMOL/L (ref 3.5–5.1)
SERVICE CMNT-IMP: ABNORMAL
SODIUM BLD-SCNC: 141 MMOL/L (ref 136–145)

## 2023-12-22 PROCEDURE — 80047 BASIC METABLC PNL IONIZED CA: CPT

## 2023-12-22 PROCEDURE — 96372 THER/PROPH/DIAG INJ SC/IM: CPT

## 2023-12-22 PROCEDURE — 6360000002 HC RX W HCPCS: Performed by: FAMILY MEDICINE

## 2023-12-22 RX ADMIN — DENOSUMAB 60 MG: 60 INJECTION SUBCUTANEOUS at 13:50

## 2024-04-16 ENCOUNTER — OFFICE VISIT (OUTPATIENT)
Age: 74
End: 2024-04-16
Payer: MEDICARE

## 2024-04-16 VITALS
WEIGHT: 184 LBS | BODY MASS INDEX: 33.86 KG/M2 | DIASTOLIC BLOOD PRESSURE: 62 MMHG | RESPIRATION RATE: 16 BRPM | TEMPERATURE: 97.9 F | OXYGEN SATURATION: 96 % | HEIGHT: 62 IN | HEART RATE: 89 BPM | SYSTOLIC BLOOD PRESSURE: 134 MMHG

## 2024-04-16 DIAGNOSIS — F33.8 OTHER RECURRENT DEPRESSIVE DISORDERS (HCC): ICD-10-CM

## 2024-04-16 DIAGNOSIS — E78.2 MIXED HYPERLIPIDEMIA: ICD-10-CM

## 2024-04-16 DIAGNOSIS — Z12.11 COLON CANCER SCREENING: ICD-10-CM

## 2024-04-16 DIAGNOSIS — Z00.00 ENCOUNTER FOR SUBSEQUENT ANNUAL WELLNESS VISIT (AWV) IN MEDICARE PATIENT: ICD-10-CM

## 2024-04-16 DIAGNOSIS — M48.07 FORAMINAL STENOSIS OF LUMBOSACRAL REGION: Primary | ICD-10-CM

## 2024-04-16 PROCEDURE — G0439 PPPS, SUBSEQ VISIT: HCPCS | Performed by: FAMILY MEDICINE

## 2024-04-16 PROCEDURE — 99214 OFFICE O/P EST MOD 30 MIN: CPT | Performed by: FAMILY MEDICINE

## 2024-04-16 PROCEDURE — G8400 PT W/DXA NO RESULTS DOC: HCPCS | Performed by: FAMILY MEDICINE

## 2024-04-16 PROCEDURE — G8427 DOCREV CUR MEDS BY ELIG CLIN: HCPCS | Performed by: FAMILY MEDICINE

## 2024-04-16 PROCEDURE — 1123F ACP DISCUSS/DSCN MKR DOCD: CPT | Performed by: FAMILY MEDICINE

## 2024-04-16 PROCEDURE — 3017F COLORECTAL CA SCREEN DOC REV: CPT | Performed by: FAMILY MEDICINE

## 2024-04-16 PROCEDURE — G9899 SCRN MAM PERF RSLTS DOC: HCPCS | Performed by: FAMILY MEDICINE

## 2024-04-16 PROCEDURE — G8417 CALC BMI ABV UP PARAM F/U: HCPCS | Performed by: FAMILY MEDICINE

## 2024-04-16 PROCEDURE — 1090F PRES/ABSN URINE INCON ASSESS: CPT | Performed by: FAMILY MEDICINE

## 2024-04-16 PROCEDURE — 1036F TOBACCO NON-USER: CPT | Performed by: FAMILY MEDICINE

## 2024-04-16 SDOH — ECONOMIC STABILITY: INCOME INSECURITY: HOW HARD IS IT FOR YOU TO PAY FOR THE VERY BASICS LIKE FOOD, HOUSING, MEDICAL CARE, AND HEATING?: NOT HARD AT ALL

## 2024-04-16 SDOH — ECONOMIC STABILITY: FOOD INSECURITY: WITHIN THE PAST 12 MONTHS, THE FOOD YOU BOUGHT JUST DIDN'T LAST AND YOU DIDN'T HAVE MONEY TO GET MORE.: NEVER TRUE

## 2024-04-16 SDOH — ECONOMIC STABILITY: FOOD INSECURITY: WITHIN THE PAST 12 MONTHS, YOU WORRIED THAT YOUR FOOD WOULD RUN OUT BEFORE YOU GOT MONEY TO BUY MORE.: NEVER TRUE

## 2024-04-16 ASSESSMENT — PATIENT HEALTH QUESTIONNAIRE - PHQ9
SUM OF ALL RESPONSES TO PHQ9 QUESTIONS 1 & 2: 2
SUM OF ALL RESPONSES TO PHQ QUESTIONS 1-9: 2
1. LITTLE INTEREST OR PLEASURE IN DOING THINGS: SEVERAL DAYS
SUM OF ALL RESPONSES TO PHQ QUESTIONS 1-9: 2
SUM OF ALL RESPONSES TO PHQ QUESTIONS 1-9: 2
2. FEELING DOWN, DEPRESSED OR HOPELESS: SEVERAL DAYS
SUM OF ALL RESPONSES TO PHQ QUESTIONS 1-9: 2

## 2024-04-16 ASSESSMENT — LIFESTYLE VARIABLES
HOW MANY STANDARD DRINKS CONTAINING ALCOHOL DO YOU HAVE ON A TYPICAL DAY: 1 OR 2
HOW OFTEN DO YOU HAVE A DRINK CONTAINING ALCOHOL: MONTHLY OR LESS

## 2024-04-16 NOTE — PROGRESS NOTES
Medicare Annual Wellness Visit    Georgia Henriquez is here for Medicare AWV    Assessment & Plan   Foraminal stenosis of lumbosacral region  Other recurrent depressive disorders (HCC)  Assessment & Plan:   Well-controlled, continue current medications and continue current treatment plan  Colon cancer screening  -     AFL - Cali Bella MD, Gastroenterology, [place] ([facility])  Encounter for subsequent annual wellness visit (AWV) in Medicare patient  Mixed hyperlipidemia  -     Comprehensive Metabolic Panel; Future  -     Lipid Panel; Future    Recommendations for Preventive Services Due: see orders and patient instructions/AVS.  Recommended screening schedule for the next 5-10 years is provided to the patient in written form: see Patient Instructions/AVS.     Return in about 6 months (around 10/16/2024) for hypertension, hyperlipidemia.     Subjective       Patient's complete Health Risk Assessment and screening values have been reviewed and are found in Flowsheets. The following problems were reviewed today and where indicated follow up appointments were made and/or referrals ordered.    Positive Risk Factor Screenings with Interventions:                Activity, Diet, and Weight:  On average, how many days per week do you engage in moderate to strenuous exercise (like a brisk walk)?: 0 days  On average, how many minutes do you engage in exercise at this level?: 0 min    Do you eat balanced/healthy meals regularly?: Yes    Body mass index is 33.65 kg/m². (!) Abnormal      Inactivity Interventions:  Patient advised to follow up in the office for further evaluation and treatment  Obesity Interventions:  Patient advised to follow-up in this office for further evaluation and treatment                 Advanced Directives:  Do you have a Living Will?: (!) No    Intervention:                       Objective   Vitals:    04/16/24 1459   BP: 134/62   Site: Right Upper Arm   Position: Sitting   Cuff Size: Large Adult

## 2024-04-16 NOTE — PROGRESS NOTES
HPI  Georgia Mariaurger 73 y.o. female  presents to the office today for AWV and follow up on chronic conditions.     Blood pressure 134/62, pulse 89, temperature 97.9 °F (36.6 °C), temperature source Temporal, resp. rate 16, height 1.575 m (5' 2\"), weight 83.5 kg (184 lb), SpO2 96 %. Body mass index is 33.65 kg/m².   Chief Complaint   Patient presents with    Medicare AWV      Hyperlipidemia: Lipid panel on 10/16/23 notable for total cholesterol 170, HDL 65, LDL 67, and triglycerides 190. Pt continues with atorvastatin 20mg daily.     Depression: She reports her depression is controlled. She takes Wellbutrin 300mg daily, Buspar 10mg BID, and lexapro 20mg 0.5 tablet daily.     She plans to eventually have her knee replaced, but will wait until her 's medical conditions are more stable and he is able to support her. She takes Tylenol for arthritis BID for her knee pain. She also takes a turmeric supplement. She also reports sometimes feeling like things are crawling on her legs and believes it may be RLS. She sometimes has trouble sleeping at night due to the pain. She doesn't not want to take any additional pain medication to help her sleep.     MRI of lumbar spine on 2/21/24 showed moderate to severe bilateral foraminal stenosis at L4-5. She had a injection which helped relieve the numbness in her left leg and foot.     Health Maintenance:   RSV Vaccine: due, encouraged to get  COVID Booster: 9/13/23, will update every 6 months  Colonoscopy: due, remind to schedule, referred to Dr. Bella  DEXA: 5/2023 showed osteoporosis, on prolia, repeat in 5/2025    Current Outpatient Medications   Medication Sig Dispense Refill    atorvastatin (LIPITOR) 20 MG tablet TAKE 1 TABLET BY MOUTH EVERY DAY 90 tablet 1    fluticasone (FLONASE) 50 MCG/ACT nasal spray SPRAY 2 SPRAYS INTO EACH NOSTRIL EVERY DAY 1 each 5    denosumab (PROLIA) 60 MG/ML SOSY SC injection Inject 1 mL into the skin once for 1 dose 180 mL 1    albuterol

## 2024-04-16 NOTE — PROGRESS NOTES
Chief Complaint   Patient presents with    Medicare AWV     \"Have you been to the ER, urgent care clinic since your last visit?  Hospitalized since your last visit?\"      no  “Have you seen or consulted any other health care providers outside of [facility] since your last visit?”    Pain Management Spondylolisthesis of lumbar region   Lumbar radiculopathy        “Have you had a colorectal cancer screening such as a colonoscopy/FIT/Cologuard?    No     Date of last Colonoscopy: 9/13/2018  No cologuard on file  No FIT/FOBT on file   No flexible sigmoidoscopy on file         Click Here for Release of Records Request

## 2024-04-17 LAB
ALBUMIN SERPL-MCNC: 3.5 G/DL (ref 3.5–5)
ALBUMIN/GLOB SERPL: 1 (ref 1.1–2.2)
ALP SERPL-CCNC: 81 U/L (ref 45–117)
ALT SERPL-CCNC: 22 U/L (ref 12–78)
ANION GAP SERPL CALC-SCNC: 4 MMOL/L (ref 5–15)
AST SERPL-CCNC: 13 U/L (ref 15–37)
BILIRUB SERPL-MCNC: 0.2 MG/DL (ref 0.2–1)
BUN SERPL-MCNC: 32 MG/DL (ref 6–20)
BUN/CREAT SERPL: 28 (ref 12–20)
CALCIUM SERPL-MCNC: 10.3 MG/DL (ref 8.5–10.1)
CHLORIDE SERPL-SCNC: 109 MMOL/L (ref 97–108)
CHOLEST SERPL-MCNC: 199 MG/DL
CO2 SERPL-SCNC: 27 MMOL/L (ref 21–32)
CREAT SERPL-MCNC: 1.15 MG/DL (ref 0.55–1.02)
GLOBULIN SER CALC-MCNC: 3.4 G/DL (ref 2–4)
GLUCOSE SERPL-MCNC: 113 MG/DL (ref 65–100)
HDLC SERPL-MCNC: 69 MG/DL
HDLC SERPL: 2.9 (ref 0–5)
LDLC SERPL CALC-MCNC: 89 MG/DL (ref 0–100)
POTASSIUM SERPL-SCNC: 4.9 MMOL/L (ref 3.5–5.1)
PROT SERPL-MCNC: 6.9 G/DL (ref 6.4–8.2)
SODIUM SERPL-SCNC: 140 MMOL/L (ref 136–145)
TRIGL SERPL-MCNC: 205 MG/DL
VLDLC SERPL CALC-MCNC: 41 MG/DL

## 2024-06-02 DIAGNOSIS — E78.2 MIXED HYPERLIPIDEMIA: ICD-10-CM

## 2024-06-04 NOTE — TELEPHONE ENCOUNTER
Last appointment: 4/16/24 Warren, lipid 4/2024  Next appointment: 10/21/24 Warren  Previous refill encounter(s): 12/13/23 90 + 1    Requested Prescriptions     Pending Prescriptions Disp Refills    atorvastatin (LIPITOR) 20 MG tablet [Pharmacy Med Name: ATORVASTATIN 20 MG TABLET] 90 tablet 1     Sig: Take 1 tablet by mouth daily     For Pharmacy Admin Tracking Only    Program: Medication Refill  CPA in place:    Recommendation Provided To:   Intervention Detail: New Rx: 1, reason: Patient Preference  Intervention Accepted By:   Gap Closed?:    Time Spent (min): 5

## 2024-06-05 RX ORDER — ATORVASTATIN CALCIUM 20 MG/1
20 TABLET, FILM COATED ORAL DAILY
Qty: 90 TABLET | Refills: 1 | Status: SHIPPED | OUTPATIENT
Start: 2024-06-05

## 2024-06-25 ENCOUNTER — HOSPITAL ENCOUNTER (OUTPATIENT)
Facility: HOSPITAL | Age: 74
Setting detail: INFUSION SERIES
Discharge: HOME | End: 2024-06-25
Payer: MEDICARE

## 2024-06-25 VITALS
HEART RATE: 87 BPM | DIASTOLIC BLOOD PRESSURE: 76 MMHG | SYSTOLIC BLOOD PRESSURE: 142 MMHG | WEIGHT: 187 LBS | TEMPERATURE: 97.8 F | RESPIRATION RATE: 18 BRPM | BODY MASS INDEX: 34.2 KG/M2

## 2024-06-25 DIAGNOSIS — M81.0 OSTEOPOROSIS, UNSPECIFIED OSTEOPOROSIS TYPE, UNSPECIFIED PATHOLOGICAL FRACTURE PRESENCE: Primary | ICD-10-CM

## 2024-06-25 LAB
ANION GAP BLD CALC-SCNC: 8 MMOL/L (ref 10–20)
CA-I BLD-MCNC: 1.2 MMOL/L (ref 1.12–1.32)
CHLORIDE BLD-SCNC: 109 MMOL/L (ref 98–107)
CO2 BLD-SCNC: 26 MMOL/L (ref 21–32)
CREAT BLD-MCNC: 1.06 MG/DL (ref 0.6–1.3)
GLUCOSE BLD-MCNC: 139 MG/DL (ref 70–110)
MAGNESIUM SERPL-MCNC: 2.3 MG/DL (ref 1.6–2.4)
PHOSPHATE SERPL-MCNC: 3.5 MG/DL (ref 2.6–4.7)
POTASSIUM BLD-SCNC: 4 MMOL/L (ref 3.5–5.1)
SERVICE CMNT-IMP: ABNORMAL
SODIUM BLD-SCNC: 143 MMOL/L (ref 136–145)

## 2024-06-25 PROCEDURE — 83735 ASSAY OF MAGNESIUM: CPT

## 2024-06-25 PROCEDURE — 80047 BASIC METABLC PNL IONIZED CA: CPT

## 2024-06-25 PROCEDURE — 36415 COLL VENOUS BLD VENIPUNCTURE: CPT

## 2024-06-25 PROCEDURE — 96372 THER/PROPH/DIAG INJ SC/IM: CPT

## 2024-06-25 PROCEDURE — 6360000002 HC RX W HCPCS: Performed by: FAMILY MEDICINE

## 2024-06-25 PROCEDURE — 84100 ASSAY OF PHOSPHORUS: CPT

## 2024-06-25 RX ADMIN — DENOSUMAB 60 MG: 60 INJECTION SUBCUTANEOUS at 15:01

## 2024-06-25 NOTE — PROGRESS NOTES
[facility] Short Note                       Date: 2024    Name: Georgia Henriquez    MRN: [id number]         : 1950      1430 Pt admit to [facility] for Prolia ambulatory in stable condition. Assessment completed. No new concerns voiced. Labs drawn on left AC. Please review pending lab results in CC.      Ms. Henriquez's vitals were reviewed prior to and after treatment.   Patient Vitals for the past 12 hrs:   Temp Pulse Resp BP   24 1415 97.8 °F (36.6 °C) 87 18 (!) 142/76         Lab results were obtained and reviewed.  Recent Results (from the past 12 hour(s))   POC CHEM 8    Collection Time: 24  2:36 PM   Result Value Ref Range    POC Ionized Calcium 1.20 1.12 - 1.32 mmol/L    POC Sodium 143 136 - 145 mmol/L    POC Potassium 4.0 3.5 - 5.1 mmol/L    POC Chloride 109 (H) 98 - 107 mmol/L    POC TCO2 26.0 21 - 32 mmol/L    Anion Gap, POC 8 (L) 10 - 20 mmol/L    POC Glucose 139 (H) 70 - 110 mg/dL    POC Creatinine 1.06 0.6 - 1.3 mg/dL    eGFR, POC 56 (L) >60 ml/min/1.73m2    UA Comment Comment Not Indicated.         Medications given: Given left arm   Medications Administered         denosumab (PROLIA) SC injection 60 mg Admin Date  2024 Action  Given Dose  60 mg Route  SubCUTAneous Administered By  Rae Hines RN                Ms. Henriquez tolerated the infusion, and had no complaints.    [facility].     Future Appointments   Date Time Provider Department Center   10/21/2024 11:15 AM John Kelley MD [facility] BS AMB   2024  2:30 PM [facility] [place] [facility]       Rae Hines RN  2024  3:05 PM

## 2024-10-10 ENCOUNTER — HOSPITAL ENCOUNTER (OUTPATIENT)
Facility: HOSPITAL | Age: 74
Setting detail: OUTPATIENT SURGERY
Discharge: HOME | End: 2024-10-10
Attending: INTERNAL MEDICINE | Admitting: INTERNAL MEDICINE
Payer: MEDICARE

## 2024-10-10 ENCOUNTER — ANESTHESIA (OUTPATIENT)
Facility: HOSPITAL | Age: 74
End: 2024-10-10
Payer: MEDICARE

## 2024-10-10 ENCOUNTER — ANESTHESIA EVENT (OUTPATIENT)
Facility: HOSPITAL | Age: 74
End: 2024-10-10
Payer: MEDICARE

## 2024-10-10 VITALS
HEIGHT: 62 IN | OXYGEN SATURATION: 98 % | WEIGHT: 190 LBS | TEMPERATURE: 97.7 F | DIASTOLIC BLOOD PRESSURE: 67 MMHG | HEART RATE: 74 BPM | RESPIRATION RATE: 18 BRPM | SYSTOLIC BLOOD PRESSURE: 129 MMHG | BODY MASS INDEX: 34.96 KG/M2

## 2024-10-10 PROCEDURE — 6360000002 HC RX W HCPCS: Performed by: STUDENT IN AN ORGANIZED HEALTH CARE EDUCATION/TRAINING PROGRAM

## 2024-10-10 PROCEDURE — 3700000000 HC ANESTHESIA ATTENDED CARE: Performed by: INTERNAL MEDICINE

## 2024-10-10 PROCEDURE — 3600007512: Performed by: INTERNAL MEDICINE

## 2024-10-10 PROCEDURE — 2500000003 HC RX 250 WO HCPCS: Performed by: STUDENT IN AN ORGANIZED HEALTH CARE EDUCATION/TRAINING PROGRAM

## 2024-10-10 PROCEDURE — 2580000003 HC RX 258: Performed by: STUDENT IN AN ORGANIZED HEALTH CARE EDUCATION/TRAINING PROGRAM

## 2024-10-10 PROCEDURE — 2709999900 HC NON-CHARGEABLE SUPPLY: Performed by: INTERNAL MEDICINE

## 2024-10-10 PROCEDURE — 3700000001 HC ADD 15 MINUTES (ANESTHESIA): Performed by: INTERNAL MEDICINE

## 2024-10-10 PROCEDURE — 3600007502: Performed by: INTERNAL MEDICINE

## 2024-10-10 PROCEDURE — 7100000010 HC PHASE II RECOVERY - FIRST 15 MIN: Performed by: INTERNAL MEDICINE

## 2024-10-10 PROCEDURE — 7100000011 HC PHASE II RECOVERY - ADDTL 15 MIN: Performed by: INTERNAL MEDICINE

## 2024-10-10 RX ORDER — LIDOCAINE HYDROCHLORIDE 20 MG/ML
INJECTION, SOLUTION EPIDURAL; INFILTRATION; INTRACAUDAL; PERINEURAL
Status: DISCONTINUED | OUTPATIENT
Start: 2024-10-10 | End: 2024-10-10 | Stop reason: SDUPTHER

## 2024-10-10 RX ORDER — SODIUM CHLORIDE 9 MG/ML
25 INJECTION, SOLUTION INTRAVENOUS PRN
Status: DISCONTINUED | OUTPATIENT
Start: 2024-10-10 | End: 2024-10-10 | Stop reason: HOSPADM

## 2024-10-10 RX ORDER — SODIUM CHLORIDE 0.9 % (FLUSH) 0.9 %
5-40 SYRINGE (ML) INJECTION PRN
Status: DISCONTINUED | OUTPATIENT
Start: 2024-10-10 | End: 2024-10-10 | Stop reason: HOSPADM

## 2024-10-10 RX ORDER — SODIUM CHLORIDE 9 MG/ML
INJECTION, SOLUTION INTRAVENOUS CONTINUOUS
Status: DISCONTINUED | OUTPATIENT
Start: 2024-10-10 | End: 2024-10-10 | Stop reason: HOSPADM

## 2024-10-10 RX ORDER — SODIUM CHLORIDE 0.9 % (FLUSH) 0.9 %
5-40 SYRINGE (ML) INJECTION EVERY 12 HOURS SCHEDULED
Status: DISCONTINUED | OUTPATIENT
Start: 2024-10-10 | End: 2024-10-10 | Stop reason: HOSPADM

## 2024-10-10 RX ORDER — SODIUM CHLORIDE, SODIUM LACTATE, POTASSIUM CHLORIDE, CALCIUM CHLORIDE 600; 310; 30; 20 MG/100ML; MG/100ML; MG/100ML; MG/100ML
INJECTION, SOLUTION INTRAVENOUS
Status: DISCONTINUED | OUTPATIENT
Start: 2024-10-10 | End: 2024-10-10 | Stop reason: SDUPTHER

## 2024-10-10 RX ADMIN — SODIUM CHLORIDE, POTASSIUM CHLORIDE, SODIUM LACTATE AND CALCIUM CHLORIDE: 600; 310; 30; 20 INJECTION, SOLUTION INTRAVENOUS at 10:01

## 2024-10-10 RX ADMIN — PROPOFOL 150 MCG/KG/MIN: 10 INJECTION, EMULSION INTRAVENOUS at 10:07

## 2024-10-10 RX ADMIN — PROPOFOL 100 MG: 10 INJECTION, EMULSION INTRAVENOUS at 10:06

## 2024-10-10 RX ADMIN — LIDOCAINE HYDROCHLORIDE 40 MG: 20 INJECTION, SOLUTION EPIDURAL; INFILTRATION; INTRACAUDAL; PERINEURAL at 10:06

## 2024-10-10 ASSESSMENT — PAIN - FUNCTIONAL ASSESSMENT: PAIN_FUNCTIONAL_ASSESSMENT: NONE - DENIES PAIN

## 2024-10-10 NOTE — ANESTHESIA PRE PROCEDURE
Neck ROM: full  Mouth opening: > = 3 FB   Dental: normal exam         Pulmonary:Negative Pulmonary ROS breath sounds clear to auscultation                             Cardiovascular:  Exercise tolerance: good (>4 METS)  (+) hyperlipidemia        Rhythm: regular  Rate: normal                    Neuro/Psych:   (+) psychiatric history:depression/anxiety             GI/Hepatic/Renal: Neg GI/Hepatic/Renal ROS            Endo/Other:    (+) : arthritis: OA..                 Abdominal:             Vascular: negative vascular ROS.         Other Findings:         Anesthesia Plan      MAC     ASA 2       Induction: intravenous.      Anesthetic plan and risks discussed with patient.      Plan discussed with CRNA.    Attending anesthesiologist reviewed and agrees with Preprocedure content            Finn Chavira MD   10/10/2024

## 2024-10-10 NOTE — DISCHARGE INSTRUCTIONS
[facility]  Cali Bella M.D.  [address], [place] [address] [phone]            COLONOSCOPY DISCHARGE INSTRUCTIONS    Georgia Henriquez  [id number]  1950    DISCOMFORT:  Redness at IV site- apply warm compress to area; if redness or soreness persist- contact your physician  There may be a slight amount of blood passed from the rectum  Gaseous discomfort- walking, belching will help relieve any discomfort  You may not operate a vehicle for 12 hours  You may not engage in an occupation involving machinery or appliances for the  rest of today  You may not drink alcoholic beverages for at least 12 hours  Avoid making any critical decisions for at least 24 hours    DIET:   You may resume your normal diet, but some patients find that heavy or large  meals may lead to indigestion or vomiting. I suggest a light meal as first food  intake. I recommend a whole food, plant-based diet for your overall health.    ACTIVITY:  You may resume your normal daily activities.  It is recommended that you spend the remainder of the day resting - avoid any strenuous activity.    CALL M.D. IF ANY SIGN OF:   Increasing pain, nausea, vomiting  Abdominal distension (swelling)  Significant bleeding (oral or rectal)  Fever   Pain in chest area  Shortness of breath    Additional Instructions:   Call Dr. Bella if any questions or problems at [phone]   Should have a repeat colonoscopy in 5 years. Colonoscopy showed pan-diverticulosis.

## 2024-10-10 NOTE — ANESTHESIA POSTPROCEDURE EVALUATION
Department of Anesthesiology  Postprocedure Note    Patient: Georgia Henriquez  MRN: [id number]  YOB: 1950  Date of evaluation: 10/10/2024    Procedure Summary       Date: 10/10/24 Room / Location: [facility] ENDO 01 / [facility] ENDOSCOPY    Anesthesia Start: 1001 Anesthesia Stop: 1020    Procedure: COLONOSCOPY Diagnosis:       Colon cancer screening      (Colon cancer screening [Z12.11])    Surgeons: Cali Bella MD Responsible Provider: Finn Chavira MD    Anesthesia Type: MAC ASA Status: 2            Anesthesia Type: MAC    Nancy Phase I: Nancy Score: 10    Nancy Phase II: Nancy Score: 10    Anesthesia Post Evaluation    Patient location during evaluation: PACU  Patient participation: complete - patient participated  Level of consciousness: awake  Pain score: 0  Airway patency: patent  Nausea & Vomiting: no nausea  Cardiovascular status: blood pressure returned to baseline and hemodynamically stable  Respiratory status: acceptable  Hydration status: stable  Pain management: adequate and satisfactory to patient    No notable events documented.

## 2024-10-10 NOTE — PROGRESS NOTES
Verified patient name and date of birth, scheduled procedure, and informed consent. Reviewed general discharge instructions and  information.  Assessed patient. Awake, alert, and oriented per baseline. Vital signs stable (see vital sign flowsheet). Respiratory status within defined limits, abdomen soft and non tender. Skin with in defined limits.     Initial RN admission and assessment performed and documented in Endoscopy navigator.     Patient evaluated by anesthesia in pre-procedure holding.     All procedural vital signs, airway assessment, and level of consciousness information monitored and recorded by anesthesia staff on the anesthesia record.     Report received from CRNA post procedure.  Patient transported to recovery area by RN.    Endoscopy post procedure time out was performed and specimens were verified with physician.    Endoscope was pre-cleaned at bedside immediately following procedure by Gladys

## 2024-10-10 NOTE — OP NOTE
[facility]  Cali Bella M.D.  [address] [id number] [phone]               Colonoscopy Procedure Note    NAME: Georgia Henriquez  :  1950  MRN:  [id number]    Indications: Personal history of colon polyps    : Cali Bella MD    Referring Provider:  John Kelley MD    Staff: Circulator: Artur Gallegos RN  Endoscopy Technician: Haile Louise    Prosthetic devices, grafts, tissues, transplant, or devices implanted: none    Medicines:  MAC anesthesia      Procedure Details:  After informed consent was obtained with all risks and benefits of the procedure explained and preprocedure exam completed, the patient was placed in the left lateral decubitus position.  Universal protocol for patient identification was performed and documented in the nursing notes.  Throughout the procedure, the patient's blood pressure was monitored at least every five minutes; pulse, and oxygen saturations were monitored continuously.  All vital signs were documented in the nursing notes.  A digital rectal exam was performed and was normal.  The Olympus videocolonoscope  was inserted in the rectum and carefully advanced to the cecum, which was identified by the ileocecal valve and appendiceal orifice. The colonoscope was slowly withdrawn with careful evaluation between folds. Retroflexion in the rectum and second examination of the right colon was performed; findings and interventions are described below. Procedure start time, extent reached time/cecum time, and procedure end time are documented in the nursing notes.  The quality of preparation was adequate.       Findings:   Moderate pan-diverticulosis    Interventions:    none    Specimens:   * No specimens in log *    EBL:  None.    Complications:   No immediate complications     Impression:  -See above    Recommendations:   Recommendation for next colonscopy in 5 years  If < 10 years, reason:  above average risk  patient    Resume normal medication(s).       Signed by: Cali Bella MD          10/10/2024  10:23 AM

## 2024-10-10 NOTE — H&P
[address] [address]          Pre-procedure History and Physical       NAME:  Georgia Henriquez   :   1950   MRN:   [id number]     CHIEF COMPLAINT/HPI: Personal history of colon polyp    PMH:  Past Medical History:   Diagnosis Date    Anxiety     AR (allergic rhinitis)     Depression     Lyme disease 2020    treated with doxy    Spondylosis        PSH:  Past Surgical History:   Procedure Laterality Date    HYSTERECTOMY (CERVIX STATUS UNKNOWN)         Allergies:  No Known Allergies    Home Medications:  Prior to Admission Medications   Prescriptions Last Dose Informant Patient Reported? Taking?   Calcium Carbonate-Vitamin D 600-3.125 MG-MCG TABS 10/9/2024  Yes Yes   Sig: Take by mouth   Omega-3 Fatty Acids (FISH OIL) 1000 MG capsule 10/9/2024  Yes Yes   Sig: Take 2 capsules by mouth daily   albuterol sulfate HFA (PROVENTIL;VENTOLIN;PROAIR) 108 (90 Base) MCG/ACT inhaler Past Month  Yes Yes   Sig: Inhale 2 puffs into the lungs every 6 hours as needed   atorvastatin (LIPITOR) 20 MG tablet 10/9/2024  No Yes   Sig: Take 1 tablet by mouth daily   buPROPion (WELLBUTRIN XL) 300 MG extended release tablet 10/9/2024  Yes Yes   Sig: Take by mouth daily   busPIRone (BUSPAR) 10 MG tablet 10/9/2024  Yes Yes   Sig: Take by mouth 2 times daily   denosumab (PROLIA) 60 MG/ML SOSY SC injection   No No   Sig: Inject 1 mL into the skin once for 1 dose   escitalopram (LEXAPRO) 20 MG tablet 10/9/2024  Yes Yes   Sig: Take by mouth daily   estradiol (ESTRACE) 0.1 MG/GM vaginal cream 10/9/2024  Yes Yes   Sig: INSERT 1 GRAM VAGINALLY TWICE WEEKLY   fluticasone (FLONASE) 50 MCG/ACT nasal spray 10/9/2024  No Yes   Sig: SPRAY 2 SPRAYS INTO EACH NOSTRIL EVERY DAY      Facility-Administered Medications: None       Hospital Medications:  Current Facility-Administered Medications   Medication Dose Route Frequency    0.9 % sodium chloride infusion   IntraVENous Continuous    sodium  chloride flush 0.9 % injection 5-40 mL  5-40 mL IntraVENous 2 times per day    sodium chloride flush 0.9 % injection 5-40 mL  5-40 mL IntraVENous PRN    0.9 % sodium chloride infusion  25 mL IntraVENous PRN       Family History:  Family History   Problem Relation Age of Onset    Heart Disease Father 94        a fib    Other Brother 44        Amlydosis    Lung Disease Father     Cancer Mother 82        lyphoma       Social History:  Social History     Tobacco Use    Smoking status: Former     Current packs/day: 0.00     Types: Cigarettes     Quit date: 2003     Years since quittin.1    Smokeless tobacco: Never   Substance Use Topics    Alcohol use: Yes     Alcohol/week: 0.8 standard drinks of alcohol         PHYSICAL EXAM PRIOR TO SEDATION:  General: Alert, in no acute distress    Lungs:            CTA bilaterally  Heart:  Normal S1, S2    Abdomen: Soft, Non distended, Non tender.  Normoactive bowel sounds.      Assessment:   Stable for sedation administration.  Date of last colonoscopy: , Polyps  Yes    Plan:     Endoscopic procedure with sedation     Signed By: Cali Bella MD     10/10/2024  10:01 AM

## 2024-10-21 ENCOUNTER — OFFICE VISIT (OUTPATIENT)
Age: 74
End: 2024-10-21
Payer: MEDICARE

## 2024-10-21 VITALS
WEIGHT: 188 LBS | HEART RATE: 75 BPM | TEMPERATURE: 98.6 F | SYSTOLIC BLOOD PRESSURE: 132 MMHG | HEIGHT: 62 IN | DIASTOLIC BLOOD PRESSURE: 66 MMHG | RESPIRATION RATE: 16 BRPM | OXYGEN SATURATION: 97 % | BODY MASS INDEX: 34.6 KG/M2

## 2024-10-21 DIAGNOSIS — E55.9 VITAMIN D DEFICIENCY: ICD-10-CM

## 2024-10-21 DIAGNOSIS — R53.81 MALAISE: ICD-10-CM

## 2024-10-21 DIAGNOSIS — E78.2 MIXED HYPERLIPIDEMIA: Primary | ICD-10-CM

## 2024-10-21 DIAGNOSIS — Z23 FLU VACCINE NEED: ICD-10-CM

## 2024-10-21 PROCEDURE — PBSHW INFLUENZA, FLUAD TRIVALENT, (AGE 65 Y+), IM, PRESERVATIVE FREE, 0.5ML: Performed by: FAMILY MEDICINE

## 2024-10-21 PROCEDURE — G8417 CALC BMI ABV UP PARAM F/U: HCPCS | Performed by: FAMILY MEDICINE

## 2024-10-21 PROCEDURE — G9899 SCRN MAM PERF RSLTS DOC: HCPCS | Performed by: FAMILY MEDICINE

## 2024-10-21 PROCEDURE — G0008 ADMIN INFLUENZA VIRUS VAC: HCPCS | Performed by: FAMILY MEDICINE

## 2024-10-21 PROCEDURE — 1036F TOBACCO NON-USER: CPT | Performed by: FAMILY MEDICINE

## 2024-10-21 PROCEDURE — 99214 OFFICE O/P EST MOD 30 MIN: CPT | Performed by: FAMILY MEDICINE

## 2024-10-21 PROCEDURE — 1123F ACP DISCUSS/DSCN MKR DOCD: CPT | Performed by: FAMILY MEDICINE

## 2024-10-21 PROCEDURE — G8482 FLU IMMUNIZE ORDER/ADMIN: HCPCS | Performed by: FAMILY MEDICINE

## 2024-10-21 PROCEDURE — G8427 DOCREV CUR MEDS BY ELIG CLIN: HCPCS | Performed by: FAMILY MEDICINE

## 2024-10-21 PROCEDURE — 1090F PRES/ABSN URINE INCON ASSESS: CPT | Performed by: FAMILY MEDICINE

## 2024-10-21 PROCEDURE — 3017F COLORECTAL CA SCREEN DOC REV: CPT | Performed by: FAMILY MEDICINE

## 2024-10-21 PROCEDURE — G8400 PT W/DXA NO RESULTS DOC: HCPCS | Performed by: FAMILY MEDICINE

## 2024-10-21 RX ORDER — M-VIT,TX,IRON,MINS/CALC/FOLIC 27MG-0.4MG
1 TABLET ORAL DAILY
COMMUNITY

## 2024-10-21 SDOH — ECONOMIC STABILITY: FOOD INSECURITY: WITHIN THE PAST 12 MONTHS, THE FOOD YOU BOUGHT JUST DIDN'T LAST AND YOU DIDN'T HAVE MONEY TO GET MORE.: NEVER TRUE

## 2024-10-21 SDOH — ECONOMIC STABILITY: FOOD INSECURITY: WITHIN THE PAST 12 MONTHS, YOU WORRIED THAT YOUR FOOD WOULD RUN OUT BEFORE YOU GOT MONEY TO BUY MORE.: NEVER TRUE

## 2024-10-21 SDOH — ECONOMIC STABILITY: INCOME INSECURITY: HOW HARD IS IT FOR YOU TO PAY FOR THE VERY BASICS LIKE FOOD, HOUSING, MEDICAL CARE, AND HEATING?: NOT HARD AT ALL

## 2024-10-21 ASSESSMENT — PATIENT HEALTH QUESTIONNAIRE - PHQ9
SUM OF ALL RESPONSES TO PHQ QUESTIONS 1-9: 2
SUM OF ALL RESPONSES TO PHQ QUESTIONS 1-9: 2
SUM OF ALL RESPONSES TO PHQ9 QUESTIONS 1 & 2: 2
SUM OF ALL RESPONSES TO PHQ QUESTIONS 1-9: 2
SUM OF ALL RESPONSES TO PHQ QUESTIONS 1-9: 2
1. LITTLE INTEREST OR PLEASURE IN DOING THINGS: SEVERAL DAYS
2. FEELING DOWN, DEPRESSED OR HOPELESS: SEVERAL DAYS

## 2024-10-21 ASSESSMENT — ENCOUNTER SYMPTOMS
NAUSEA: 0
COUGH: 0
VOMITING: 0
SHORTNESS OF BREATH: 0
BACK PAIN: 0

## 2024-10-21 NOTE — PROGRESS NOTES
HPI  Georgia IRMA Elizabethr 73 y.o. female  presents to the office today for follow up on chronic conditions.     Blood pressure 132/66, pulse 75, temperature 98.6 °F (37 °C), temperature source Skin, resp. rate 16, height 1.575 m (5' 2\"), weight 85.3 kg (188 lb), SpO2 97%. Body mass index is 34.39 kg/m².   Chief Complaint   Patient presents with    Depression    Anxiety      She is overall doing well apart from stress at home regarding her 's health concerns. She is looking forward to an upcoming trip to [place].     Pt seen by ortho JADE Webster at [facility] for fracture of right fibula. This happened from twisting her ankle while walking on some rocks in [place]. She walked around on the fracture for 5 weeks. She is wearing boot today.     She takes magnesium 400mg daily and calcium carbonate-vitamin D 600-3.125 mg-mcg daily.     Hyperlipidemia: Lipid panel on 4/16/24 notable for total cholesterol 199, HDL 69, LDL 89, and triglycerides 205. Pt continues with atorvastatin 20mg daily.     Depression: She takes Wellbutrin 300mg daily, Buspar 10mg BID, and lexapro 20mg daily.     Health Maintenance:   Flu Shot: given today - 10/21/24  COVID Booster: ~6/2024, advised to get booster in December or January   RSV Vaccine: planning to get     Current Outpatient Medications   Medication Sig Dispense Refill    Multiple Vitamins-Minerals (THERAPEUTIC MULTIVITAMIN-MINERALS) tablet Take 1 tablet by mouth daily      Magnesium 400 MG CAPS Take by mouth      atorvastatin (LIPITOR) 20 MG tablet Take 1 tablet by mouth daily 90 tablet 1    fluticasone (FLONASE) 50 MCG/ACT nasal spray SPRAY 2 SPRAYS INTO EACH NOSTRIL EVERY DAY 1 each 5    albuterol sulfate HFA (PROVENTIL;VENTOLIN;PROAIR) 108 (90 Base) MCG/ACT inhaler Inhale 2 puffs into the lungs every 6 hours as needed      buPROPion (WELLBUTRIN XL) 300 MG extended release tablet Take by mouth daily      busPIRone (BUSPAR) 10 MG tablet Take by mouth 2 times daily      Calcium

## 2024-10-21 NOTE — PROGRESS NOTES
Chief Complaint   Patient presents with    Depression    Anxiety     \"Have you been to the ER, urgent care clinic since your last visit?  Hospitalized since your last visit?\"      Colonoscopy   “Have you seen or consulted any other health care providers outside our system since your last visit?”    Ortho   Pain management

## 2024-10-24 ENCOUNTER — LAB (OUTPATIENT)
Age: 74
End: 2024-10-24

## 2024-10-24 DIAGNOSIS — R53.81 MALAISE: ICD-10-CM

## 2024-10-24 DIAGNOSIS — E55.9 VITAMIN D DEFICIENCY: ICD-10-CM

## 2024-10-24 DIAGNOSIS — E78.2 MIXED HYPERLIPIDEMIA: ICD-10-CM

## 2024-10-24 LAB
25(OH)D3 SERPL-MCNC: 57.9 NG/ML (ref 30–100)
ALBUMIN SERPL-MCNC: 3.6 G/DL (ref 3.5–5)
ALBUMIN/GLOB SERPL: 1.2 (ref 1.1–2.2)
ALP SERPL-CCNC: 77 U/L (ref 45–117)
ALT SERPL-CCNC: 21 U/L (ref 12–78)
ANION GAP SERPL CALC-SCNC: 5 MMOL/L (ref 2–12)
AST SERPL-CCNC: 12 U/L (ref 15–37)
BASOPHILS # BLD: 0 K/UL (ref 0–0.1)
BASOPHILS NFR BLD: 0 % (ref 0–1)
BILIRUB SERPL-MCNC: 0.3 MG/DL (ref 0.2–1)
BUN SERPL-MCNC: 25 MG/DL (ref 6–20)
BUN/CREAT SERPL: 23 (ref 12–20)
CALCIUM SERPL-MCNC: 9.5 MG/DL (ref 8.5–10.1)
CHLORIDE SERPL-SCNC: 109 MMOL/L (ref 97–108)
CHOLEST SERPL-MCNC: 190 MG/DL
CO2 SERPL-SCNC: 24 MMOL/L (ref 21–32)
CREAT SERPL-MCNC: 1.08 MG/DL (ref 0.55–1.02)
DIFFERENTIAL METHOD BLD: ABNORMAL
EOSINOPHIL # BLD: 0.2 K/UL (ref 0–0.4)
EOSINOPHIL NFR BLD: 2 % (ref 0–7)
ERYTHROCYTE [DISTWIDTH] IN BLOOD BY AUTOMATED COUNT: 13.3 % (ref 11.5–14.5)
GLOBULIN SER CALC-MCNC: 3 G/DL (ref 2–4)
GLUCOSE SERPL-MCNC: 105 MG/DL (ref 65–100)
HCT VFR BLD AUTO: 36.7 % (ref 35–47)
HDLC SERPL-MCNC: 75 MG/DL
HDLC SERPL: 2.5 (ref 0–5)
HGB BLD-MCNC: 11.7 G/DL (ref 11.5–16)
IMM GRANULOCYTES # BLD AUTO: 0.1 K/UL (ref 0–0.04)
IMM GRANULOCYTES NFR BLD AUTO: 1 % (ref 0–0.5)
LDLC SERPL CALC-MCNC: 73.8 MG/DL (ref 0–100)
LYMPHOCYTES # BLD: 1.9 K/UL (ref 0.8–3.5)
LYMPHOCYTES NFR BLD: 21 % (ref 12–49)
MAGNESIUM SERPL-MCNC: 1.9 MG/DL (ref 1.6–2.4)
MCH RBC QN AUTO: 29.2 PG (ref 26–34)
MCHC RBC AUTO-ENTMCNC: 31.9 G/DL (ref 30–36.5)
MCV RBC AUTO: 91.5 FL (ref 80–99)
MONOCYTES # BLD: 1 K/UL (ref 0–1)
MONOCYTES NFR BLD: 11 % (ref 5–13)
NEUTS SEG # BLD: 6 K/UL (ref 1.8–8)
NEUTS SEG NFR BLD: 65 % (ref 32–75)
NRBC # BLD: 0 K/UL (ref 0–0.01)
NRBC BLD-RTO: 0 PER 100 WBC
PLATELET # BLD AUTO: 240 K/UL (ref 150–400)
PMV BLD AUTO: 11 FL (ref 8.9–12.9)
POTASSIUM SERPL-SCNC: 4.6 MMOL/L (ref 3.5–5.1)
PROT SERPL-MCNC: 6.6 G/DL (ref 6.4–8.2)
RBC # BLD AUTO: 4.01 M/UL (ref 3.8–5.2)
SODIUM SERPL-SCNC: 138 MMOL/L (ref 136–145)
TRIGL SERPL-MCNC: 206 MG/DL
VLDLC SERPL CALC-MCNC: 41.2 MG/DL
WBC # BLD AUTO: 9.2 K/UL (ref 3.6–11)

## 2024-10-25 NOTE — RESULT ENCOUNTER NOTE
Labs are stable for the most part, cholesterol numbers have actually slightly improved.    White count is stable.    Renal function is stable.    Liver function is stable.    Vitamin D levels are therapeutic.    Magnesium levels are normal.    If you have any questions let me know

## 2024-12-01 DIAGNOSIS — E78.2 MIXED HYPERLIPIDEMIA: ICD-10-CM

## 2024-12-02 RX ORDER — ATORVASTATIN CALCIUM 20 MG/1
20 TABLET, FILM COATED ORAL DAILY
Qty: 90 TABLET | Refills: 1 | Status: SHIPPED | OUTPATIENT
Start: 2024-12-02

## 2024-12-27 ENCOUNTER — HOSPITAL ENCOUNTER (OUTPATIENT)
Facility: HOSPITAL | Age: 74
Setting detail: INFUSION SERIES
Discharge: HOME | End: 2024-12-27
Payer: MEDICARE

## 2024-12-27 VITALS
WEIGHT: 189 LBS | SYSTOLIC BLOOD PRESSURE: 112 MMHG | RESPIRATION RATE: 18 BRPM | DIASTOLIC BLOOD PRESSURE: 67 MMHG | HEART RATE: 79 BPM | HEIGHT: 62 IN | BODY MASS INDEX: 34.78 KG/M2 | TEMPERATURE: 97 F

## 2024-12-27 DIAGNOSIS — M81.0 OSTEOPOROSIS, UNSPECIFIED OSTEOPOROSIS TYPE, UNSPECIFIED PATHOLOGICAL FRACTURE PRESENCE: Primary | ICD-10-CM

## 2024-12-27 LAB
ANION GAP BLD CALC-SCNC: 8.6 MMOL/L (ref 10–20)
CA-I BLD-MCNC: 1.24 MMOL/L (ref 1.15–1.33)
CHLORIDE BLD-SCNC: 109 MMOL/L (ref 98–107)
CO2 BLD-SCNC: 21.4 MMOL/L (ref 21–32)
CREAT BLD-MCNC: 0.96 MG/DL (ref 0.6–1.3)
GLUCOSE BLD-MCNC: 101 MG/DL (ref 74–99)
PHOSPHATE SERPL-MCNC: 4.8 MG/DL (ref 2.6–4.7)
POTASSIUM BLD-SCNC: 4.3 MMOL/L (ref 3.5–5.1)
SERVICE CMNT-IMP: ABNORMAL
SODIUM BLD-SCNC: 139 MMOL/L (ref 136–145)

## 2024-12-27 PROCEDURE — 84100 ASSAY OF PHOSPHORUS: CPT

## 2024-12-27 PROCEDURE — 80047 BASIC METABLC PNL IONIZED CA: CPT

## 2024-12-27 PROCEDURE — 36415 COLL VENOUS BLD VENIPUNCTURE: CPT

## 2024-12-27 PROCEDURE — 96372 THER/PROPH/DIAG INJ SC/IM: CPT

## 2024-12-27 PROCEDURE — 6360000002 HC RX W HCPCS: Performed by: FAMILY MEDICINE

## 2024-12-27 RX ADMIN — DENOSUMAB 60 MG: 60 INJECTION SUBCUTANEOUS at 15:10

## 2024-12-27 NOTE — PLAN OF CARE
[facility]                 Date: 2024  Name: Georgia Henriquez  MRN: [id number]       : 1950    [facility] - ambulatory in stable condition.   Denies flu-like symptoms    Labs ordered/ drawn. Patient meets treatment parameters. See Epic Results Review for details.   Prolia given in Left Arm. Tolerated procedure well without issue. Band-aid and Gauze Applied to site.     Patient aware of upcoming appointment. Patient declined post- monitoring time. Education provided.     Ms. Henriquez's vitals were reviewed prior to treatment.   Patient Vitals for the past 12 hrs:   Temp Pulse Resp BP   24 1445 97 °F (36.1 °C) 79 18 112/67       Medications Administered         denosumab (PROLIA) SC injection 60 mg Admin Date  2024 Action  Given Dose  60 mg Route  SubCUTAneous Documented By  Leila So RN          Future Appointments   Date Time Provider Department Center   2025 11:15 AM John Kelley MD [facility]   2025  2:30 PM [facility] FASTTRACK 2 [facility]     Leila So RN  2024    Problem: Safety - Adult  Goal: Free from fall injury  Outcome: Progressing

## 2025-04-18 PROBLEM — E66.01 SEVERE OBESITY (HCC): Status: RESOLVED | Noted: 2019-09-04 | Resolved: 2025-04-18

## 2025-04-18 NOTE — PROGRESS NOTES
Georgia Henriquez 74 y.o. female  presents to the office today follow up    There were no vitals taken for this visit. There is no height or weight on file to calculate BMI. No chief complaint on file.       History of Present Illness        Current Outpatient Medications   Medication Sig Dispense Refill    atorvastatin (LIPITOR) 20 MG tablet TAKE 1 TABLET BY MOUTH EVERY DAY 90 tablet 1    Multiple Vitamins-Minerals (THERAPEUTIC MULTIVITAMIN-MINERALS) tablet Take 1 tablet by mouth daily      Magnesium 400 MG CAPS Take by mouth      fluticasone (FLONASE) 50 MCG/ACT nasal spray SPRAY 2 SPRAYS INTO EACH NOSTRIL EVERY DAY 1 each 5    denosumab (PROLIA) 60 MG/ML SOSY SC injection Inject 1 mL into the skin once for 1 dose 180 mL 1    albuterol sulfate HFA (PROVENTIL;VENTOLIN;PROAIR) 108 (90 Base) MCG/ACT inhaler Inhale 2 puffs into the lungs every 6 hours as needed      buPROPion (WELLBUTRIN XL) 300 MG extended release tablet Take by mouth daily      busPIRone (BUSPAR) 10 MG tablet Take by mouth 2 times daily      Calcium Carbonate-Vitamin D 600-3.125 MG-MCG TABS Take by mouth      escitalopram (LEXAPRO) 20 MG tablet Take by mouth daily      estradiol (ESTRACE) 0.1 MG/GM vaginal cream INSERT 1 GRAM VAGINALLY TWICE WEEKLY      Omega-3 Fatty Acids (FISH OIL) 1000 MG capsule Take 2 capsules by mouth daily       No current facility-administered medications for this visit.     No Known Allergies  Past Medical History:   Diagnosis Date    Anxiety     AR (allergic rhinitis)     Depression     Lyme disease 09/2020    treated with doxy    Severe obesity 09/04/2019    Spondylosis      Past Surgical History:   Procedure Laterality Date    COLONOSCOPY N/A 10/10/2024    COLONOSCOPY performed by Cali Bella MD at [facility] ENDOSCOPY    HYSTERECTOMY (CERVIX STATUS UNKNOWN)       Family History   Problem Relation Age of Onset    Heart Disease Father 94        a fib    Other Brother 44        Amlydosis    Lung Disease Father     Cancer

## 2025-04-18 NOTE — PROGRESS NOTES
Medicare Annual Wellness Visit    Georgia Henriquez is here for 6 Month Follow-Up    Assessment & Plan   Mixed hyperlipidemia  -     Lipid Panel; Future  -     Comprehensive Metabolic Panel; Future  Obesity, Class I, BMI 30-34.9  Osteoporosis, unspecified osteoporosis type, unspecified pathological fracture presence  Vitamin D deficiency  -     Vitamin D 25 Hydroxy; Future  Malaise  -     CBC with Auto Differential; Future  Encounter for subsequent annual wellness visit (AWV) in Medicare patient  Hyperparathyroidism  -     External Referral To Endocrinology       Return in about 6 months (around 10/21/2025) for follow up.     Subjective       Patient's complete Health Risk Assessment and screening values have been reviewed and are found in Flowsheets. The following problems were reviewed today and where indicated follow up appointments were made and/or referrals ordered.    Positive Risk Factor Screenings with Interventions:              Inactivity:  On average, how many days per week do you engage in moderate to strenuous exercise (like a brisk walk)?: 0 days (!) Abnormal  On average, how many minutes do you engage in exercise at this level?: 0 min  Interventions:  Patient advised to follow up in the office for further evaluation and treatment     Abnormal BMI (obese):  Body mass index is 34.02 kg/m². (!) Abnormal  Interventions:  Patient advised to follow-up in this office for further evaluation and treatment             Advanced Directives:  Do you have a Living Will?: (!) No               Objective   Vitals:    04/21/25 1159   BP: 126/77   Pulse: 73   Resp: 16   Temp: 98.7 °F (37.1 °C)   SpO2: 96%   Weight: 84.4 kg (186 lb)   Height: 1.575 m (5' 2\")      Body mass index is 34.02 kg/m².                  No Known Allergies  Prior to Visit Medications    Medication Sig Taking? Authorizing Provider   atorvastatin (LIPITOR) 20 MG tablet TAKE 1 TABLET BY MOUTH EVERY DAY Yes John Kelley MD   Multiple

## 2025-04-18 NOTE — PROGRESS NOTES
Georgia Henriquez 74 y.o. female  presents to the office today follow up    Blood pressure 126/77, pulse 73, temperature 98.7 °F (37.1 °C), resp. rate 16, height 1.575 m (5' 2\"), weight 84.4 kg (186 lb), SpO2 96%. Body mass index is 34.02 kg/m².   Chief Complaint   Patient presents with    6 Month Follow-Up        History of Present Illness  The patient presents for evaluation of hyperparathyroidism, depression, and medication management. She is accompanied by her .    The chief complaint is hyperparathyroidism. Elevated parathyroid levels have been noted, and she is currently on Prolia. An endocrinologist consultation is being sought, with a preference for Dr. Martin, although records have not yet been transferred. A family history of thyroid issues is reported, with both daughters experiencing major thyroid problems. Dr. De La Cruz has advised immediate medical attention due to the potential risk of adenocarcinoma.    Depressive symptoms have been reported, attributed to the use of estrogen cream prescribed for recurrent bladder infections. The cream was discontinued due to these side effects. A similar experience with birth control pills during menopause, which also induced depressive symptoms, is recalled. Current medications include Wellbutrin 300 mg, Lexapro 10 mg, and BuSpar, which are reported as beneficial. No refills are required at this time, and an appointment with her provider is scheduled for Wednesday. There is a desire to discontinue these medications, questioning if hyperparathyroidism could be contributing to depressive symptoms.    A refill of Cymbalta 60 mg once daily is requested for her , who is under the care of a new neurorehab doctor. He is responding well to the new treatment plan, which includes a neuropsych rehab workup. He is also on Lexapro and gabapentin.    A leg fracture that is not healing properly is reported. She is under the care of an orthopedist at [facility], who has

## 2025-04-21 ENCOUNTER — OFFICE VISIT (OUTPATIENT)
Age: 75
End: 2025-04-21
Payer: MEDICARE

## 2025-04-21 VITALS
SYSTOLIC BLOOD PRESSURE: 126 MMHG | RESPIRATION RATE: 16 BRPM | BODY MASS INDEX: 34.23 KG/M2 | OXYGEN SATURATION: 96 % | HEART RATE: 73 BPM | HEIGHT: 62 IN | DIASTOLIC BLOOD PRESSURE: 77 MMHG | WEIGHT: 186 LBS | TEMPERATURE: 98.7 F

## 2025-04-21 DIAGNOSIS — M81.0 OSTEOPOROSIS, UNSPECIFIED OSTEOPOROSIS TYPE, UNSPECIFIED PATHOLOGICAL FRACTURE PRESENCE: ICD-10-CM

## 2025-04-21 DIAGNOSIS — Z00.00 ENCOUNTER FOR SUBSEQUENT ANNUAL WELLNESS VISIT (AWV) IN MEDICARE PATIENT: ICD-10-CM

## 2025-04-21 DIAGNOSIS — E66.811 OBESITY, CLASS I, BMI 30-34.9: ICD-10-CM

## 2025-04-21 DIAGNOSIS — R53.81 MALAISE: ICD-10-CM

## 2025-04-21 DIAGNOSIS — E78.2 MIXED HYPERLIPIDEMIA: Primary | ICD-10-CM

## 2025-04-21 DIAGNOSIS — E21.3 HYPERPARATHYROIDISM: ICD-10-CM

## 2025-04-21 DIAGNOSIS — E55.9 VITAMIN D DEFICIENCY: ICD-10-CM

## 2025-04-21 PROCEDURE — 1159F MED LIST DOCD IN RCRD: CPT | Performed by: FAMILY MEDICINE

## 2025-04-21 PROCEDURE — 99214 OFFICE O/P EST MOD 30 MIN: CPT | Performed by: FAMILY MEDICINE

## 2025-04-21 PROCEDURE — G8427 DOCREV CUR MEDS BY ELIG CLIN: HCPCS | Performed by: FAMILY MEDICINE

## 2025-04-21 PROCEDURE — 1126F AMNT PAIN NOTED NONE PRSNT: CPT | Performed by: FAMILY MEDICINE

## 2025-04-21 PROCEDURE — 3017F COLORECTAL CA SCREEN DOC REV: CPT | Performed by: FAMILY MEDICINE

## 2025-04-21 PROCEDURE — 1090F PRES/ABSN URINE INCON ASSESS: CPT | Performed by: FAMILY MEDICINE

## 2025-04-21 PROCEDURE — 1036F TOBACCO NON-USER: CPT | Performed by: FAMILY MEDICINE

## 2025-04-21 PROCEDURE — G8417 CALC BMI ABV UP PARAM F/U: HCPCS | Performed by: FAMILY MEDICINE

## 2025-04-21 PROCEDURE — 1123F ACP DISCUSS/DSCN MKR DOCD: CPT | Performed by: FAMILY MEDICINE

## 2025-04-21 PROCEDURE — 1160F RVW MEDS BY RX/DR IN RCRD: CPT | Performed by: FAMILY MEDICINE

## 2025-04-21 PROCEDURE — G8400 PT W/DXA NO RESULTS DOC: HCPCS | Performed by: FAMILY MEDICINE

## 2025-04-21 PROCEDURE — G0439 PPPS, SUBSEQ VISIT: HCPCS | Performed by: FAMILY MEDICINE

## 2025-04-21 SDOH — ECONOMIC STABILITY: FOOD INSECURITY: WITHIN THE PAST 12 MONTHS, THE FOOD YOU BOUGHT JUST DIDN'T LAST AND YOU DIDN'T HAVE MONEY TO GET MORE.: NEVER TRUE

## 2025-04-21 SDOH — ECONOMIC STABILITY: FOOD INSECURITY: WITHIN THE PAST 12 MONTHS, YOU WORRIED THAT YOUR FOOD WOULD RUN OUT BEFORE YOU GOT MONEY TO BUY MORE.: NEVER TRUE

## 2025-04-21 ASSESSMENT — PATIENT HEALTH QUESTIONNAIRE - PHQ9
8. MOVING OR SPEAKING SO SLOWLY THAT OTHER PEOPLE COULD HAVE NOTICED. OR THE OPPOSITE, BEING SO FIGETY OR RESTLESS THAT YOU HAVE BEEN MOVING AROUND A LOT MORE THAN USUAL: NOT AT ALL
1. LITTLE INTEREST OR PLEASURE IN DOING THINGS: NOT AT ALL
5. POOR APPETITE OR OVEREATING: NOT AT ALL
7. TROUBLE CONCENTRATING ON THINGS, SUCH AS READING THE NEWSPAPER OR WATCHING TELEVISION: NOT AT ALL
2. FEELING DOWN, DEPRESSED OR HOPELESS: NOT AT ALL
4. FEELING TIRED OR HAVING LITTLE ENERGY: NOT AT ALL
SUM OF ALL RESPONSES TO PHQ QUESTIONS 1-9: 0
6. FEELING BAD ABOUT YOURSELF - OR THAT YOU ARE A FAILURE OR HAVE LET YOURSELF OR YOUR FAMILY DOWN: NOT AT ALL
SUM OF ALL RESPONSES TO PHQ QUESTIONS 1-9: 0
9. THOUGHTS THAT YOU WOULD BE BETTER OFF DEAD, OR OF HURTING YOURSELF: NOT AT ALL
SUM OF ALL RESPONSES TO PHQ QUESTIONS 1-9: 0
10. IF YOU CHECKED OFF ANY PROBLEMS, HOW DIFFICULT HAVE THESE PROBLEMS MADE IT FOR YOU TO DO YOUR WORK, TAKE CARE OF THINGS AT HOME, OR GET ALONG WITH OTHER PEOPLE: NOT DIFFICULT AT ALL
3. TROUBLE FALLING OR STAYING ASLEEP: NOT AT ALL
SUM OF ALL RESPONSES TO PHQ QUESTIONS 1-9: 0

## 2025-04-21 NOTE — PROGRESS NOTES
Chief Complaint   Patient presents with    6 Month Follow-Up     /77   Pulse 73   Temp 98.7 °F (37.1 °C)   Resp 16   Ht 1.575 m (5' 2\")   Wt 84.4 kg (186 lb)   SpO2 96%   BMI 34.02 kg/m²   1. Have you been to the ER, urgent care clinic since your last visit?  Hospitalized since your last visit?No    2. Have you seen or consulted any other health care providers outside of the [facility] since your last visit?  Include any pap smears or colon screening. No

## 2025-04-22 LAB
25(OH)D3 SERPL-MCNC: 66.7 NG/ML (ref 30–100)
ALBUMIN SERPL-MCNC: 4 G/DL (ref 3.5–5)
ALBUMIN/GLOB SERPL: 1.3 (ref 1.1–2.2)
ALP SERPL-CCNC: 96 U/L (ref 45–117)
ALT SERPL-CCNC: 26 U/L (ref 12–78)
ANION GAP SERPL CALC-SCNC: 3 MMOL/L (ref 2–12)
AST SERPL-CCNC: 22 U/L (ref 15–37)
BASOPHILS # BLD: 0.06 K/UL (ref 0–0.1)
BASOPHILS NFR BLD: 0.4 % (ref 0–1)
BILIRUB SERPL-MCNC: 0.4 MG/DL (ref 0.2–1)
BUN SERPL-MCNC: 32 MG/DL (ref 6–20)
BUN/CREAT SERPL: 30 (ref 12–20)
CALCIUM SERPL-MCNC: 10.4 MG/DL (ref 8.5–10.1)
CHLORIDE SERPL-SCNC: 105 MMOL/L (ref 97–108)
CHOLEST SERPL-MCNC: 220 MG/DL
CO2 SERPL-SCNC: 29 MMOL/L (ref 21–32)
CREAT SERPL-MCNC: 1.06 MG/DL (ref 0.55–1.02)
DIFFERENTIAL METHOD BLD: ABNORMAL
EOSINOPHIL # BLD: 0.17 K/UL (ref 0–0.4)
EOSINOPHIL NFR BLD: 1.2 % (ref 0–7)
ERYTHROCYTE [DISTWIDTH] IN BLOOD BY AUTOMATED COUNT: 13.4 % (ref 11.5–14.5)
GLOBULIN SER CALC-MCNC: 3 G/DL (ref 2–4)
GLUCOSE SERPL-MCNC: 95 MG/DL (ref 65–100)
HCT VFR BLD AUTO: 38.2 % (ref 35–47)
HDLC SERPL-MCNC: 75 MG/DL
HDLC SERPL: 2.9 (ref 0–5)
HGB BLD-MCNC: 12.3 G/DL (ref 11.5–16)
IMM GRANULOCYTES # BLD AUTO: 0.08 K/UL (ref 0–0.04)
IMM GRANULOCYTES NFR BLD AUTO: 0.6 % (ref 0–0.5)
LDLC SERPL CALC-MCNC: 101 MG/DL (ref 0–100)
LYMPHOCYTES # BLD: 2.2 K/UL (ref 0.8–3.5)
LYMPHOCYTES NFR BLD: 15.7 % (ref 12–49)
MCH RBC QN AUTO: 28.9 PG (ref 26–34)
MCHC RBC AUTO-ENTMCNC: 32.2 G/DL (ref 30–36.5)
MCV RBC AUTO: 89.9 FL (ref 80–99)
MONOCYTES # BLD: 1.27 K/UL (ref 0–1)
MONOCYTES NFR BLD: 9.1 % (ref 5–13)
NEUTS SEG # BLD: 10.25 K/UL (ref 1.8–8)
NEUTS SEG NFR BLD: 73 % (ref 32–75)
NRBC # BLD: 0 K/UL (ref 0–0.01)
NRBC BLD-RTO: 0 PER 100 WBC
PLATELET # BLD AUTO: 263 K/UL (ref 150–400)
PMV BLD AUTO: 11.1 FL (ref 8.9–12.9)
POTASSIUM SERPL-SCNC: 4.7 MMOL/L (ref 3.5–5.1)
PROT SERPL-MCNC: 7 G/DL (ref 6.4–8.2)
RBC # BLD AUTO: 4.25 M/UL (ref 3.8–5.2)
SODIUM SERPL-SCNC: 137 MMOL/L (ref 136–145)
TRIGL SERPL-MCNC: 220 MG/DL
VLDLC SERPL CALC-MCNC: 44 MG/DL
WBC # BLD AUTO: 14 K/UL (ref 3.6–11)

## 2025-04-22 ASSESSMENT — ENCOUNTER SYMPTOMS
ABDOMINAL PAIN: 0
SHORTNESS OF BREATH: 0

## 2025-05-19 DIAGNOSIS — E78.2 MIXED HYPERLIPIDEMIA: ICD-10-CM

## 2025-05-20 NOTE — TELEPHONE ENCOUNTER
PCP: John Kelley MD    Last appt: 4/21/2025       Future Appointments   Date Time Provider Department Center   8/11/2025  2:30 PM [facility] FASTTRACK 4 [facility]   10/27/2025 10:45 AM John Kelley MD [facility] DEP       Requested Prescriptions     Pending Prescriptions Disp Refills    atorvastatin (LIPITOR) 20 MG tablet [Pharmacy Med Name: ATORVASTATIN 20 MG TABLET] 90 tablet 1     Sig: TAKE 1 TABLET BY MOUTH EVERY DAY       Prior labs and Blood pressures:  BP Readings from Last 3 Encounters:   04/21/25 126/77   12/27/24 112/67   10/21/24 132/66     Lab Results   Component Value Date/Time     04/21/2025 01:09 PM    K 4.7 04/21/2025 01:09 PM     04/21/2025 01:09 PM    CO2 29 04/21/2025 01:09 PM    BUN 32 04/21/2025 01:09 PM    GFRAA 52 04/12/2022 12:14 PM     Lab Results   Component Value Date/Time    UBC0MRQL 5.5 03/04/2020 02:15 PM     Lab Results   Component Value Date/Time    CHOL 220 04/21/2025 01:09 PM    HDL 75 04/21/2025 01:09 PM     04/21/2025 01:09 PM    LDL 89 04/16/2024 04:15 PM    VLDL 44 04/21/2025 01:09 PM    VLDL 36 08/31/2020 07:09 AM     No results found for: \"VITD3\"    Lab Results   Component Value Date/Time    TSH 3.140 08/31/2020 07:09 AM

## 2025-05-22 RX ORDER — ATORVASTATIN CALCIUM 20 MG/1
20 TABLET, FILM COATED ORAL DAILY
Qty: 90 TABLET | Refills: 1 | Status: SHIPPED | OUTPATIENT
Start: 2025-05-22

## 2025-05-25 ENCOUNTER — RESULTS FOLLOW-UP (OUTPATIENT)
Age: 75
End: 2025-05-25

## 2025-05-25 DIAGNOSIS — D72.829 LEUKOCYTOSIS, UNSPECIFIED TYPE: ICD-10-CM

## 2025-05-25 DIAGNOSIS — E83.52 SERUM CALCIUM ELEVATED: Primary | ICD-10-CM

## 2025-05-25 NOTE — RESULT ENCOUNTER NOTE
Ms Henriquez,    Eunice,  Can you please advise her to come back for labs this week            Comparing your labs to 7 months ago renal function is stable calcium levels went up a bit    Your cholesterol also went up slightly.    With regards to the CBC the white count went up to 14,000 I want to recheck this again with a peripheral smear.    Vitamin D levels are stable.    I have placed lab orders please either call our office or go to an the nearest [facility] center for blood work

## 2025-06-25 ENCOUNTER — LAB (OUTPATIENT)
Age: 75
End: 2025-06-25

## 2025-06-25 DIAGNOSIS — D72.829 LEUKOCYTOSIS, UNSPECIFIED TYPE: ICD-10-CM

## 2025-06-25 DIAGNOSIS — E83.52 SERUM CALCIUM ELEVATED: ICD-10-CM

## 2025-06-26 LAB
25(OH)D3 SERPL-MCNC: 56.8 NG/ML (ref 30–100)
ANION GAP SERPL CALC-SCNC: 7 MMOL/L (ref 2–12)
BASOPHILS # BLD: 0.05 K/UL (ref 0–0.1)
BASOPHILS NFR BLD: 0.6 % (ref 0–1)
BUN SERPL-MCNC: 25 MG/DL (ref 6–20)
BUN/CREAT SERPL: 20 (ref 12–20)
CALCIUM SERPL-MCNC: 10.1 MG/DL (ref 8.5–10.1)
CALCIUM SERPL-MCNC: 9.6 MG/DL (ref 8.5–10.1)
CALCIUM UR-MCNC: <5 MG/DL
CHLORIDE SERPL-SCNC: 104 MMOL/L (ref 97–108)
CO2 SERPL-SCNC: 25 MMOL/L (ref 21–32)
CREAT SERPL-MCNC: 1.22 MG/DL (ref 0.55–1.02)
DIFFERENTIAL METHOD BLD: ABNORMAL
EOSINOPHIL # BLD: 0.18 K/UL (ref 0–0.4)
EOSINOPHIL NFR BLD: 2 % (ref 0–7)
ERYTHROCYTE [DISTWIDTH] IN BLOOD BY AUTOMATED COUNT: 12.6 % (ref 11.5–14.5)
GLUCOSE SERPL-MCNC: 88 MG/DL (ref 65–100)
HCT VFR BLD AUTO: 41.6 % (ref 35–47)
HGB BLD-MCNC: 12.7 G/DL (ref 11.5–16)
IMM GRANULOCYTES # BLD AUTO: 0.06 K/UL (ref 0–0.04)
IMM GRANULOCYTES NFR BLD AUTO: 0.7 % (ref 0–0.5)
LYMPHOCYTES # BLD: 1.91 K/UL (ref 0.8–3.5)
LYMPHOCYTES NFR BLD: 21.1 % (ref 12–49)
MCH RBC QN AUTO: 29.1 PG (ref 26–34)
MCHC RBC AUTO-ENTMCNC: 30.5 G/DL (ref 30–36.5)
MCV RBC AUTO: 95.2 FL (ref 80–99)
MONOCYTES # BLD: 0.87 K/UL (ref 0–1)
MONOCYTES NFR BLD: 9.6 % (ref 5–13)
NEUTS SEG # BLD: 5.99 K/UL (ref 1.8–8)
NEUTS SEG NFR BLD: 66 % (ref 32–75)
NRBC # BLD: 0 K/UL (ref 0–0.01)
NRBC BLD-RTO: 0 PER 100 WBC
PERIPHERAL SMEAR, MD REVIEW: NORMAL
PLATELET # BLD AUTO: 267 K/UL (ref 150–400)
PMV BLD AUTO: 10.7 FL (ref 8.9–12.9)
POTASSIUM SERPL-SCNC: 4.6 MMOL/L (ref 3.5–5.1)
PTH-INTACT SERPL-MCNC: 67.9 PG/ML (ref 18.4–88)
RBC # BLD AUTO: 4.37 M/UL (ref 3.8–5.2)
SODIUM SERPL-SCNC: 136 MMOL/L (ref 136–145)
TSH SERPL DL<=0.05 MIU/L-ACNC: 0.98 UIU/ML (ref 0.36–3.74)
WBC # BLD AUTO: 9.1 K/UL (ref 3.6–11)

## 2025-06-27 ENCOUNTER — RESULTS FOLLOW-UP (OUTPATIENT)
Age: 75
End: 2025-06-27

## 2025-06-27 DIAGNOSIS — R79.89 ELEVATED SERUM CREATININE: Primary | ICD-10-CM

## 2025-06-27 LAB
CALCIUM 24H UR-MRATE: 85 MG/24 HR (ref 142–353)
COLLECT DURATION TIME UR: 24 HR
SPECIMEN VOL 24H UR: 1700 ML

## 2025-06-30 LAB
ALBUMIN SERPL ELPH-MCNC: 4.2 G/DL (ref 2.9–4.4)
ALBUMIN/GLOB SERPL: 1.8 (ref 0.7–1.7)
ALPHA1 GLOB SERPL ELPH-MCNC: 0.2 G/DL (ref 0–0.4)
ALPHA2 GLOB SERPL ELPH-MCNC: 0.6 G/DL (ref 0.4–1)
B-GLOBULIN SERPL ELPH-MCNC: 1 G/DL (ref 0.7–1.3)
GAMMA GLOB SERPL ELPH-MCNC: 0.6 G/DL (ref 0.4–1.8)
GLOBULIN SER CALC-MCNC: 2.4 G/DL (ref 2.2–3.9)
M PROTEIN SERPL ELPH-MCNC: ABNORMAL G/DL
PROT SERPL-MCNC: 6.6 G/DL (ref 6–8.5)

## 2025-07-03 LAB — PTH RELATED PROT SERPL-SCNC: <2 PMOL/L

## 2025-08-11 ENCOUNTER — HOSPITAL ENCOUNTER (OUTPATIENT)
Facility: HOSPITAL | Age: 75
Setting detail: INFUSION SERIES
Discharge: HOME | End: 2025-08-11
Payer: MEDICARE

## 2025-08-11 VITALS
SYSTOLIC BLOOD PRESSURE: 141 MMHG | OXYGEN SATURATION: 96 % | DIASTOLIC BLOOD PRESSURE: 75 MMHG | HEART RATE: 71 BPM | TEMPERATURE: 97.9 F | RESPIRATION RATE: 18 BRPM

## 2025-08-11 DIAGNOSIS — M81.0 OSTEOPOROSIS, UNSPECIFIED OSTEOPOROSIS TYPE, UNSPECIFIED PATHOLOGICAL FRACTURE PRESENCE: Primary | ICD-10-CM

## 2025-08-11 LAB
ANION GAP BLD CALC-SCNC: 9.1 MMOL/L (ref 10–20)
CA-I BLD-MCNC: 1.18 MMOL/L (ref 1.15–1.33)
CHLORIDE BLD-SCNC: 109 MMOL/L (ref 98–107)
CO2 BLD-SCNC: 23.9 MMOL/L (ref 21–32)
CREAT BLD-MCNC: 1.01 MG/DL (ref 0.6–1.3)
GLUCOSE BLD-MCNC: 94 MG/DL (ref 74–99)
POTASSIUM BLD-SCNC: 4.5 MMOL/L (ref 3.5–5.1)
SERVICE CMNT-IMP: ABNORMAL
SODIUM BLD-SCNC: 142 MMOL/L (ref 136–145)

## 2025-08-11 PROCEDURE — 80047 BASIC METABLC PNL IONIZED CA: CPT

## 2025-08-11 PROCEDURE — 6360000002 HC RX W HCPCS

## 2025-08-11 PROCEDURE — 96372 THER/PROPH/DIAG INJ SC/IM: CPT

## 2025-08-11 RX ADMIN — DENOSUMAB 60 MG: 60 INJECTION SUBCUTANEOUS at 15:15

## 2025-08-15 ENCOUNTER — TRANSCRIBE ORDERS (OUTPATIENT)
Facility: HOSPITAL | Age: 75
End: 2025-08-15

## 2025-08-15 DIAGNOSIS — N18.2 CHRONIC KIDNEY DISEASE, STAGE 2 (MILD): Primary | ICD-10-CM

## 2025-09-02 ENCOUNTER — HOSPITAL ENCOUNTER (OUTPATIENT)
Facility: HOSPITAL | Age: 75
Discharge: HOME | End: 2025-09-05
Attending: INTERNAL MEDICINE
Payer: MEDICARE

## 2025-09-02 DIAGNOSIS — N18.2 CHRONIC KIDNEY DISEASE, STAGE 2 (MILD): ICD-10-CM

## 2025-09-02 PROCEDURE — 76770 US EXAM ABDO BACK WALL COMP: CPT

## (undated) DEVICE — SUPPLEMENT DIGESTIVE H2O SOL GI-EASE